# Patient Record
Sex: MALE | Race: ASIAN | NOT HISPANIC OR LATINO | URBAN - METROPOLITAN AREA
[De-identification: names, ages, dates, MRNs, and addresses within clinical notes are randomized per-mention and may not be internally consistent; named-entity substitution may affect disease eponyms.]

---

## 2019-09-12 ENCOUNTER — INPATIENT (INPATIENT)
Facility: HOSPITAL | Age: 64
LOS: 8 days | DRG: 3 | End: 2019-09-21
Attending: STUDENT IN AN ORGANIZED HEALTH CARE EDUCATION/TRAINING PROGRAM | Admitting: HOSPITALIST
Payer: MEDICAID

## 2019-09-12 VITALS
HEART RATE: 78 BPM | WEIGHT: 160.06 LBS | DIASTOLIC BLOOD PRESSURE: 83 MMHG | OXYGEN SATURATION: 99 % | RESPIRATION RATE: 18 BRPM | HEIGHT: 67 IN | TEMPERATURE: 98 F | SYSTOLIC BLOOD PRESSURE: 196 MMHG

## 2019-09-12 DIAGNOSIS — I20.0 UNSTABLE ANGINA: ICD-10-CM

## 2019-09-12 DIAGNOSIS — R51 HEADACHE: ICD-10-CM

## 2019-09-12 DIAGNOSIS — I25.10 ATHEROSCLEROTIC HEART DISEASE OF NATIVE CORONARY ARTERY WITHOUT ANGINA PECTORIS: ICD-10-CM

## 2019-09-12 DIAGNOSIS — F17.210 NICOTINE DEPENDENCE, CIGARETTES, UNCOMPLICATED: ICD-10-CM

## 2019-09-12 DIAGNOSIS — Z29.9 ENCOUNTER FOR PROPHYLACTIC MEASURES, UNSPECIFIED: ICD-10-CM

## 2019-09-12 DIAGNOSIS — I10 ESSENTIAL (PRIMARY) HYPERTENSION: ICD-10-CM

## 2019-09-12 DIAGNOSIS — Z98.890 OTHER SPECIFIED POSTPROCEDURAL STATES: Chronic | ICD-10-CM

## 2019-09-12 DIAGNOSIS — I25.119 ATHEROSCLEROTIC HEART DISEASE OF NATIVE CORONARY ARTERY WITH UNSPECIFIED ANGINA PECTORIS: ICD-10-CM

## 2019-09-12 DIAGNOSIS — E11.49 TYPE 2 DIABETES MELLITUS WITH OTHER DIABETIC NEUROLOGICAL COMPLICATION: ICD-10-CM

## 2019-09-12 LAB
ALBUMIN SERPL ELPH-MCNC: 4 G/DL — SIGNIFICANT CHANGE UP (ref 3.3–5)
ALP SERPL-CCNC: 85 U/L — SIGNIFICANT CHANGE UP (ref 40–120)
ALT FLD-CCNC: 15 U/L — SIGNIFICANT CHANGE UP (ref 10–45)
ANION GAP SERPL CALC-SCNC: 16 MMOL/L — SIGNIFICANT CHANGE UP (ref 5–17)
APTT BLD: 24.7 SEC — LOW (ref 27.5–36.3)
AST SERPL-CCNC: 13 U/L — SIGNIFICANT CHANGE UP (ref 10–40)
BILIRUB SERPL-MCNC: 0.7 MG/DL — SIGNIFICANT CHANGE UP (ref 0.2–1.2)
BLD GP AB SCN SERPL QL: NEGATIVE — SIGNIFICANT CHANGE UP
BUN SERPL-MCNC: 11 MG/DL — SIGNIFICANT CHANGE UP (ref 7–23)
CALCIUM SERPL-MCNC: 9.6 MG/DL — SIGNIFICANT CHANGE UP (ref 8.4–10.5)
CHLORIDE SERPL-SCNC: 99 MMOL/L — SIGNIFICANT CHANGE UP (ref 96–108)
CO2 SERPL-SCNC: 25 MMOL/L — SIGNIFICANT CHANGE UP (ref 22–31)
CREAT SERPL-MCNC: 0.94 MG/DL — SIGNIFICANT CHANGE UP (ref 0.5–1.3)
FIBRINOGEN PPP-MCNC: 578 MG/DL — HIGH (ref 350–510)
GLUCOSE SERPL-MCNC: 264 MG/DL — HIGH (ref 70–99)
HBA1C BLD-MCNC: 9.3 % — HIGH (ref 4–5.6)
HCT VFR BLD CALC: 45.5 % — SIGNIFICANT CHANGE UP (ref 39–50)
HGB BLD-MCNC: 15.5 G/DL — SIGNIFICANT CHANGE UP (ref 13–17)
INR BLD: 1.06 RATIO — SIGNIFICANT CHANGE UP (ref 0.88–1.16)
MCHC RBC-ENTMCNC: 31.1 PG — SIGNIFICANT CHANGE UP (ref 27–34)
MCHC RBC-ENTMCNC: 34 GM/DL — SIGNIFICANT CHANGE UP (ref 32–36)
MCV RBC AUTO: 91.5 FL — SIGNIFICANT CHANGE UP (ref 80–100)
NT-PROBNP SERPL-SCNC: 939 PG/ML — HIGH (ref 0–300)
PA ADP PRP-ACNC: 130 PRU — LOW (ref 194–417)
PLATELET # BLD AUTO: 111 K/UL — LOW (ref 150–400)
POTASSIUM SERPL-MCNC: 4 MMOL/L — SIGNIFICANT CHANGE UP (ref 3.5–5.3)
POTASSIUM SERPL-SCNC: 4 MMOL/L — SIGNIFICANT CHANGE UP (ref 3.5–5.3)
PROT SERPL-MCNC: 7.1 G/DL — SIGNIFICANT CHANGE UP (ref 6–8.3)
PROTHROM AB SERPL-ACNC: 12.2 SEC — SIGNIFICANT CHANGE UP (ref 10–12.9)
RBC # BLD: 4.97 M/UL — SIGNIFICANT CHANGE UP (ref 4.2–5.8)
RBC # FLD: 12.5 % — SIGNIFICANT CHANGE UP (ref 10.3–14.5)
RH IG SCN BLD-IMP: POSITIVE — SIGNIFICANT CHANGE UP
SODIUM SERPL-SCNC: 140 MMOL/L — SIGNIFICANT CHANGE UP (ref 135–145)
TROPONIN T, HIGH SENSITIVITY RESULT: 19 NG/L — SIGNIFICANT CHANGE UP (ref 0–51)
TROPONIN T, HIGH SENSITIVITY RESULT: 20 NG/L — SIGNIFICANT CHANGE UP (ref 0–51)
WBC # BLD: 9 K/UL — SIGNIFICANT CHANGE UP (ref 3.8–10.5)
WBC # FLD AUTO: 9 K/UL — SIGNIFICANT CHANGE UP (ref 3.8–10.5)

## 2019-09-12 PROCEDURE — 99254 IP/OBS CNSLTJ NEW/EST MOD 60: CPT | Mod: GC

## 2019-09-12 PROCEDURE — 93308 TTE F-UP OR LMTD: CPT | Mod: 26

## 2019-09-12 PROCEDURE — 99223 1ST HOSP IP/OBS HIGH 75: CPT | Mod: GC

## 2019-09-12 PROCEDURE — 70450 CT HEAD/BRAIN W/O DYE: CPT | Mod: 26

## 2019-09-12 PROCEDURE — 93306 TTE W/DOPPLER COMPLETE: CPT | Mod: 26

## 2019-09-12 PROCEDURE — 71046 X-RAY EXAM CHEST 2 VIEWS: CPT | Mod: 26

## 2019-09-12 PROCEDURE — 93010 ELECTROCARDIOGRAM REPORT: CPT

## 2019-09-12 PROCEDURE — 99152 MOD SED SAME PHYS/QHP 5/>YRS: CPT | Mod: GC

## 2019-09-12 PROCEDURE — 75574 CT ANGIO HRT W/3D IMAGE: CPT | Mod: 26

## 2019-09-12 PROCEDURE — 99285 EMERGENCY DEPT VISIT HI MDM: CPT

## 2019-09-12 PROCEDURE — 99255 IP/OBS CONSLTJ NEW/EST HI 80: CPT

## 2019-09-12 PROCEDURE — 93458 L HRT ARTERY/VENTRICLE ANGIO: CPT | Mod: 26,GC

## 2019-09-12 RX ORDER — CHLORHEXIDINE GLUCONATE 213 G/1000ML
1 SOLUTION TOPICAL ONCE
Refills: 0 | Status: COMPLETED | OUTPATIENT
Start: 2019-09-13 | End: 2019-09-13

## 2019-09-12 RX ORDER — ASPIRIN/CALCIUM CARB/MAGNESIUM 324 MG
324 TABLET ORAL ONCE
Refills: 0 | Status: COMPLETED | OUTPATIENT
Start: 2019-09-12 | End: 2019-09-12

## 2019-09-12 RX ORDER — METFORMIN HYDROCHLORIDE 850 MG/1
0 TABLET ORAL
Qty: 0 | Refills: 0 | DISCHARGE

## 2019-09-12 RX ORDER — SODIUM CHLORIDE 9 MG/ML
1000 INJECTION, SOLUTION INTRAVENOUS
Refills: 0 | Status: DISCONTINUED | OUTPATIENT
Start: 2019-09-12 | End: 2019-09-19

## 2019-09-12 RX ORDER — INSULIN LISPRO 100/ML
VIAL (ML) SUBCUTANEOUS
Refills: 0 | Status: DISCONTINUED | OUTPATIENT
Start: 2019-09-12 | End: 2019-09-19

## 2019-09-12 RX ORDER — INFLUENZA VIRUS VACCINE 15; 15; 15; 15 UG/.5ML; UG/.5ML; UG/.5ML; UG/.5ML
0.5 SUSPENSION INTRAMUSCULAR ONCE
Refills: 0 | Status: DISCONTINUED | OUTPATIENT
Start: 2019-09-12 | End: 2019-09-21

## 2019-09-12 RX ORDER — HYDRALAZINE HCL 50 MG
10 TABLET ORAL ONCE
Refills: 0 | Status: COMPLETED | OUTPATIENT
Start: 2019-09-12 | End: 2019-09-12

## 2019-09-12 RX ORDER — METOPROLOL TARTRATE 50 MG
12.5 TABLET ORAL
Refills: 0 | Status: DISCONTINUED | OUTPATIENT
Start: 2019-09-12 | End: 2019-09-14

## 2019-09-12 RX ORDER — ASPIRIN/CALCIUM CARB/MAGNESIUM 324 MG
325 TABLET ORAL ONCE
Refills: 0 | Status: DISCONTINUED | OUTPATIENT
Start: 2019-09-12 | End: 2019-09-12

## 2019-09-12 RX ORDER — CAPTOPRIL 12.5 MG/1
25 TABLET ORAL DAILY
Refills: 0 | Status: DISCONTINUED | OUTPATIENT
Start: 2019-09-12 | End: 2019-09-12

## 2019-09-12 RX ORDER — DEXTROSE 50 % IN WATER 50 %
15 SYRINGE (ML) INTRAVENOUS ONCE
Refills: 0 | Status: DISCONTINUED | OUTPATIENT
Start: 2019-09-12 | End: 2019-09-19

## 2019-09-12 RX ORDER — CAPTOPRIL 12.5 MG/1
0 TABLET ORAL
Qty: 0 | Refills: 0 | DISCHARGE

## 2019-09-12 RX ORDER — INSULIN LISPRO 100/ML
VIAL (ML) SUBCUTANEOUS AT BEDTIME
Refills: 0 | Status: DISCONTINUED | OUTPATIENT
Start: 2019-09-12 | End: 2019-09-19

## 2019-09-12 RX ORDER — DEXTROSE 50 % IN WATER 50 %
25 SYRINGE (ML) INTRAVENOUS ONCE
Refills: 0 | Status: DISCONTINUED | OUTPATIENT
Start: 2019-09-12 | End: 2019-09-19

## 2019-09-12 RX ORDER — ATORVASTATIN CALCIUM 80 MG/1
80 TABLET, FILM COATED ORAL AT BEDTIME
Refills: 0 | Status: DISCONTINUED | OUTPATIENT
Start: 2019-09-12 | End: 2019-09-19

## 2019-09-12 RX ORDER — CAPTOPRIL 12.5 MG/1
50 TABLET ORAL ONCE
Refills: 0 | Status: DISCONTINUED | OUTPATIENT
Start: 2019-09-12 | End: 2019-09-12

## 2019-09-12 RX ORDER — GLUCAGON INJECTION, SOLUTION 0.5 MG/.1ML
1 INJECTION, SOLUTION SUBCUTANEOUS ONCE
Refills: 0 | Status: DISCONTINUED | OUTPATIENT
Start: 2019-09-12 | End: 2019-09-19

## 2019-09-12 RX ORDER — CHLORHEXIDINE GLUCONATE 213 G/1000ML
1 SOLUTION TOPICAL ONCE
Refills: 0 | Status: COMPLETED | OUTPATIENT
Start: 2019-09-12 | End: 2019-09-12

## 2019-09-12 RX ORDER — CAPTOPRIL 12.5 MG/1
25 TABLET ORAL
Refills: 0 | Status: DISCONTINUED | OUTPATIENT
Start: 2019-09-12 | End: 2019-09-12

## 2019-09-12 RX ORDER — CEFUROXIME AXETIL 250 MG
1500 TABLET ORAL ONCE
Refills: 0 | Status: DISCONTINUED | OUTPATIENT
Start: 2019-09-12 | End: 2019-09-18

## 2019-09-12 RX ORDER — ASPIRIN/CALCIUM CARB/MAGNESIUM 324 MG
81 TABLET ORAL DAILY
Refills: 0 | Status: DISCONTINUED | OUTPATIENT
Start: 2019-09-12 | End: 2019-09-19

## 2019-09-12 RX ORDER — NICOTINE POLACRILEX 2 MG
1 GUM BUCCAL DAILY
Refills: 0 | Status: DISCONTINUED | OUTPATIENT
Start: 2019-09-12 | End: 2019-09-19

## 2019-09-12 RX ORDER — CHLORHEXIDINE GLUCONATE 213 G/1000ML
30 SOLUTION TOPICAL ONCE
Refills: 0 | Status: COMPLETED | OUTPATIENT
Start: 2019-09-12 | End: 2019-09-13

## 2019-09-12 RX ORDER — DEXTROSE 50 % IN WATER 50 %
12.5 SYRINGE (ML) INTRAVENOUS ONCE
Refills: 0 | Status: DISCONTINUED | OUTPATIENT
Start: 2019-09-12 | End: 2019-09-19

## 2019-09-12 RX ORDER — METOPROLOL TARTRATE 50 MG
100 TABLET ORAL ONCE
Refills: 0 | Status: COMPLETED | OUTPATIENT
Start: 2019-09-12 | End: 2019-09-12

## 2019-09-12 RX ADMIN — ATORVASTATIN CALCIUM 80 MILLIGRAM(S): 80 TABLET, FILM COATED ORAL at 21:20

## 2019-09-12 RX ADMIN — CHLORHEXIDINE GLUCONATE 1 APPLICATION(S): 213 SOLUTION TOPICAL at 23:21

## 2019-09-12 RX ADMIN — Medication 324 MILLIGRAM(S): at 10:03

## 2019-09-12 RX ADMIN — Medication 10 MILLIGRAM(S): at 17:52

## 2019-09-12 RX ADMIN — Medication 12.5 MILLIGRAM(S): at 21:20

## 2019-09-12 RX ADMIN — Medication 100 MILLIGRAM(S): at 10:38

## 2019-09-12 NOTE — H&P ADULT - NSICDXPASTMEDICALHX_GEN_ALL_CORE_FT
PAST MEDICAL HISTORY:  Hypertension, unspecified type     Type 2 diabetes mellitus with other neurologic complication, without long-term current use of insulin

## 2019-09-12 NOTE — H&P ADULT - NSHPSOCIALHISTORY_GEN_ALL_CORE
Former every day alcohol drinker - endorses quitting around 2011 with drinking now every 3-4 months  Current every day smoker. 2ppd for many years.  Denies prior drug use.

## 2019-09-12 NOTE — ED PROVIDER NOTE - PMH
Hypertension, unspecified type    Type 2 diabetes mellitus with other neurologic complication, without long-term current use of insulin

## 2019-09-12 NOTE — ED PROCEDURE NOTE - ATTENDING CONTRIBUTION TO CARE
I have participated in and supervised all key portions of the above procedures and agree with the above documentation. ROCIO Huerta MD
I have participated in and supervised all key portions of the above procedures and agree with the above documentation. ROCIO Huerta MD

## 2019-09-12 NOTE — H&P ADULT - NSICDXFAMILYHX_GEN_ALL_CORE_FT
FAMILY HISTORY:  FH: coronary artery disease FAMILY HISTORY:  Family history of breast cancer in sister  FH: coronary artery disease  FH: type 2 diabetes  FHx: hyperlipidemia  FHx: hypertension

## 2019-09-12 NOTE — H&P ADULT - NSHPLABSRESULTS_GEN_ALL_CORE
15.5   9.0   )-----------( 111      ( 12 Sep 2019 09:34 )             45.5     09-12    140  |  99  |  11  ----------------------------<  264<H>  4.0   |  25  |  0.94    Ca    9.6      12 Sep 2019 09:34    TPro  7.1  /  Alb  4.0  /  TBili  0.7  /  DBili  x   /  AST  13  /  ALT  15  /  AlkPhos  85  09-12    Troponin 19--> 20    < from: Xray Chest 2 Views PA/Lat (09.12.19 @ 09:45) >    IMPRESSION: Clear lungs.    < end of copied text >    < from: CT Heart with Coronaries (09.12.19 @ 12:09) >    IMPRESSION:    1.  Total coronary artery calcium score: 646.  2. Mixed plaques within left main with moderate (approximately 60-70%)   stenosis.  3.  Mixed plaques within the proximal and mid LAD with severe   (approximately 80-90%) stenosis. Noncalcified plaques through the distal   LAD with mild (iltgslklezgcp21-84%) stenosis.  4.  Mixed plaques within the proximal D1 and through the course of the D2   branch. Evaluation of the degree of stenosis is difficult secondary to   small caliber of the vessels but grossly patent.  5.  Mixed plaques within the proximal RCA with severe (100%) stenosis.   There is reconstitution of the mid and distal RCA. Noncalcified plaques   within the mid RCA with moderate (approximately 70%) stenosis. Mixed   plaques within the distal RCA with mild (approximately 30-40%) stenosis.  6.  Left ventricle hypertrophy measuring up to 16 mm within the basal   septum. Recommend correlation with echocardiogram.  7.  No pulmonary embolus.  8.  Emphysema.    < end of copied text > 15.5   9.0   )-----------( 111      ( 12 Sep 2019 09:34 )             45.5     09-12    140  |  99  |  11  ----------------------------<  264<H>  4.0   |  25  |  0.94    Ca    9.6      12 Sep 2019 09:34    TPro  7.1  /  Alb  4.0  /  TBili  0.7  /  DBili  x   /  AST  13  /  ALT  15  /  AlkPhos  85  09-12    Troponin 19--> 20    < from: Xray Chest 2 Views PA/Lat (09.12.19 @ 09:45) >    IMPRESSION: Clear lungs.    < end of copied text >    EKG tracing reviewed- abnormal paxis @82bpm, TWI v1-V2, slight STD V5-V6    < from: CT Heart with Coronaries (09.12.19 @ 12:09) >    IMPRESSION:    1.  Total coronary artery calcium score: 646.  2. Mixed plaques within left main with moderate (approximately 60-70%)   stenosis.  3.  Mixed plaques within the proximal and mid LAD with severe   (approximately 80-90%) stenosis. Noncalcified plaques through the distal   LAD with mild (mwsadbjvpntev20-18%) stenosis.  4.  Mixed plaques within the proximal D1 and through the course of the D2   branch. Evaluation of the degree of stenosis is difficult secondary to   small caliber of the vessels but grossly patent.  5.  Mixed plaques within the proximal RCA with severe (100%) stenosis.   There is reconstitution of the mid and distal RCA. Noncalcified plaques   within the mid RCA with moderate (approximately 70%) stenosis. Mixed   plaques within the distal RCA with mild (approximately 30-40%) stenosis.  6.  Left ventricle hypertrophy measuring up to 16 mm within the basal   septum. Recommend correlation with echocardiogram.  7.  No pulmonary embolus.  8.  Emphysema.    < end of copied text >

## 2019-09-12 NOTE — H&P ADULT - ASSESSMENT
64 year-old [place]ian male with history of hypertension, T2DM, and current smoker admitted with unstable angina found on coronary catheterization to have triple vessel disease.

## 2019-09-12 NOTE — CONSULT NOTE ADULT - PROBLEM SELECTOR RECOMMENDATION 9
Continue pre-op cardiac surgery workup  Check TTE/US Carotids/PFTs  Check P2y12  Pt needs 2 type +screen specimens  NPO p MN, Chlorhexidine Shower  Plan for CABG 1st case Friday with Dr. Santos

## 2019-09-12 NOTE — ED PROVIDER NOTE - NS ED ATTENDING STATEMENT MOD
I have personally seen and examined this patient. I have fully participated in the care of this patient. I have reviewed all pertinent clinical information, including history, physical exam, plan and the Medical Student's note and agree except as noted. I have personally performed a face to face diagnostic evaluation on this patient. I have reviewed the ACP note and agree with the history, exam and plan of care, except as noted.

## 2019-09-12 NOTE — ED PROCEDURE NOTE - US CPT CODES
06688 US Chest (PTX, Pleural Effussion/CHF vs COPD)/80129 Echocardiography Transthoracic with Image 2D (Echo/FAST)
46263 Guidance for Vascular Access

## 2019-09-12 NOTE — CONSULT NOTE ADULT - ASSESSMENT
65 y/o male traveler from [facility] with history of T2DM, HTN and current 2ppd smoking who presents with 6 days of left sternal chest pain and a right-sided headache with posterior right neck pain that has been present for two weeks as well. This is intermittent and lasts for 15 minutes. In ED, EKG w nonspecific ST and T wave abnormality and possible ectopic atrial rhythm. Troponins normal. CXR shows clear lungs. CT Coronary was performed to r/o PE, showed significant calcification and no pulmonary embolus. Cardiology consulted, now s/p cardiac cath demonstrating 3VD and CT surgery consulted for CABG evaluation.

## 2019-09-12 NOTE — ED PROVIDER NOTE - OBJECTIVE STATEMENT
64yom pmhx HTN DM on metformin, recent return from [facility] (12 hour connecting flighting) c.o L sided chest pain with mild sob, started today around 2 hours ago but intermittent since Friday. Woke up due to pain this am. pain at rest. No cardiac hx. Family hx of CAD. +lifelong smoker 64yom pmhx HTN (on captopril), DM on metformin, recent return from [facility] (12 hour connecting flighting) c.o L sided chest pain with mild sob, started today around 2 hours ago but intermittent since Friday. Woke up due to pain this am. Pain at rest. Pain is sharp, 8/10, non-radiating. No cardiac hx. Family hx of CAD. +lifelong smoker ([id number]). Missed his BP medication (captopril) this morning, and has poor compliance with BP meds. Denies n/v/d, abdominal pain, palpitations. Endorses some dizziness when the pain comes on. 64yom pmhx HTN (on captopril), DM on metformin, recent return from [facility] (12 hour connecting flighting) c.o L sided chest pain with mild sob, started today around 2 hours ago but intermittent since Friday. Woke up due to pain this am. Pain at rest. Pain is sharp, 8/10, non-radiating. No cardiac hx. Family hx of CAD. +lifelong smoker ([id number]). Missed his BP medication (captopril) this morning, and has poor compliance with BP meds. Denies n/v/d, abdominal pain, palpitations. Endorses some dizziness when the pain comes on.    Attending note (Bradley): 63y/o M with h/o HTN, DM2 (metformin) p/w left sided chest pain, occurring intermittently since Friday (6 days ago); occurring at rest, not a/w activity, exertion or food.  Pain sharp, located in left side of chest, non-radiating, and seems to resolve after 1-2 hours with no discernable relieving factors.  No cough.  no fever.  Pain worse since approximately 3AM this morning, near constant since then, though for past 2 hours it felt "sharper" than previously.  No SOB, no dyspnea on exertion, no fever/chills, no nausea/vomiting, no back pain, no h/o VTE/DVT/PE, no h/o cancer, no h/o recent surgery/hospitalization; +recent travel (flew back from [facility] 6 days ago; states pain started while on the plane).  +smoker (2ppd for 40+ years).  +Fh/o CAD 64yom pmhx HTN (on captopril), DM on metformin, recent return from [facility] on Friday (12 hour connecting flighting) c.o L sided chest pain with mild sob, started today around 2 hours ago but intermittent since Friday. Woke up due to pain this am. Pain at rest. Pain is sharp, 8/10, non-radiating. No cardiac hx. Family hx of CAD. +lifelong smoker ([id number]). Missed his BP medication (captopril) this morning, and has poor compliance with BP meds. Denies n/v/d, abdominal pain, palpitations. Endorses some dizziness when the pain comes on.    Attending note (Bradley): 65y/o M with h/o HTN, DM2 (metformin) p/w left sided chest pain, occurring intermittently since Friday (6 days ago); occurring at rest, not a/w activity, exertion or food.  Pain sharp, located in left side of chest, non-radiating, and seems to resolve after 1-2 hours with no discernable relieving factors.  No cough.  no fever.  Pain worse since approximately 3AM this morning, near constant since then, though for past 2 hours it felt "sharper" than previously.  No SOB, no dyspnea on exertion, no fever/chills, no nausea/vomiting, no back pain, no h/o VTE/DVT/PE, no h/o cancer, no h/o recent surgery/hospitalization; +recent travel (flew back from [facility] 6 days ago; states pain started while on the plane).  +smoker (2ppd for 40+ years).  +Fh/o CAD

## 2019-09-12 NOTE — ED PROVIDER NOTE - PROGRESS NOTE DETAILS
D-dimer elevated, will obtain CTA D-dimer elevated, will obtain CT Coronary to r/o PE and evaluate coronaries Attending note (Bradley): elevated d dimer, concerning for Pe; however, presentation also highly concerning for ACS/unstable angina; will obtain CTA PA and coronaries to further evaluate; 18g catheter placed and patient to go to CT, spoke with Ct tech.

## 2019-09-12 NOTE — CONSULT NOTE ADULT - SUBJECTIVE AND OBJECTIVE BOX
All Cardiology service information can be found 24/7 on [email or web address], password: [email or web address]    Patient seen and evaluated at bedside    Chief Complaint: chest pain    HPI:    64 M w/ HTN and T2DM p/w L substernal chest pressure that started for the past ~week. He returned from a trip to [facility] last week and presented to the hospital last night as the pain was not abating. Denies any chest pain at time of eval. Pain has been occurring intermittently and is non-exertional and non-radiating. Pt w/ extensive family hx of CAD and currently smoked 2ppd.       PMHx:   Type 2 diabetes mellitus with other neurologic complication, without long-term current use of insulin  Hypertension, unspecified type      PSHx: non-contributory      Allergies:  No Known Allergies      Home Meds: Metformin, captopril     Current Medications:       FAMILY HISTORY: CAD in mother and father      Social History:  +2ppd smoking hx, no alcohol, or IVDU    REVIEW OF SYSTEMS:  CONSTITUTIONAL: No weakness, fevers or chills  EYES/ENT: No visual changes;  No dysphagia  NECK: No pain or stiffness  RESPIRATORY: No cough, wheezing, hemoptysis; No shortness of breath  CARDIOVASCULAR: +chest pain  GASTROINTESTINAL: No abdominal or epigastric pain. No nausea, vomiting, or hematemesis; No diarrhea or constipation. No melena or hematochezia.  BACK: No back pain  GENITOURINARY: No dysuria, frequency or hematuria  NEUROLOGICAL: No numbness or weakness  SKIN: No itching, burning, rashes, or lesions   All other review of systems is negative unless indicated above.    Physical Exam:  T(F): 98.3 (09-12), Max: 98.3 (09-12)  HR: 55 (09-12) (55 - 78)  BP: 160/78 (09-12) (160/78 - 196/83)  RR: 17 (09-12)  SpO2: 97% (09-12)  GENERAL: No acute distress  HEAD:  Atraumatic, Normocephalic  ENT: EOMI, PERRLA, conjunctiva and sclera clear, Neck supple, No JVD, moist mucosa  CHEST/LUNG: Clear to auscultation bilaterally; No wheeze, equal breath sounds bilaterally   BACK: No spinal tenderness  HEART: Regular rate and rhythm; No murmurs, rubs, or gallops  ABDOMEN: Soft, Nontender, Nondistended; Bowel sounds present  EXTREMITIES:  No clubbing, cyanosis, or edema  PSYCH: Nl behavior, nl affect  NEUROLOGY: AAOx3, non-focal, cranial nerves intact  SKIN: Normal color, No rashes or lesions  LINES:    Cardiovascular Diagnostic Testing:    ECG: Personally reviewed: NSR, non-specific t-wave changes     Imaging:    < from: CT Heart with Coronaries (09.12.19 @ 12:09) >  CORONARY ARTERY ANGIOGRAM FINDINGS:    Left main: The left main is a normal caliber vessel which gives rise to   the LAD and circumflex arteries. Mixed plaques within left main with   moderate (approximately 60-70%) stenosis.    LAD: The LAD is a normal caliber vessel. Mixed plaques within the   proximal and mid LAD with severe (approximately 80-90%) stenosis.   Noncalcified plaques through the distal LAD with mild (approximately   30-40%) stenosis.    Mixed plaques within the proximal D1 and through the course of the D2   branch. Evaluation of the degree of stenosis is difficult secondary to   small caliber of the vessels but grossly patent.    LCX: The circumflex artery is a small caliber vessel. Mixed plaques   within proximal and distal LCx with severe (approximately 80-90%)   stenosis.    RCA: The right coronary artery is dominant giving rise to the posterior   descending artery and a posterolateral branch. Mixed plaques within the   proximal RCA with severe (100%) stenosis. There is reconstitution of the   mid and distal RCA. Noncalcified plaques within the mid RCA with moderate   (approximately 70%) stenosis. Mixed plaques within the distal RCA with   mild (approximately 30-40%) stenosis.    DOMINANCE: Right coronary artery.    < end of copied text >      CXR: Personally reviewed    Labs: Personally reviewed                        15.5   9.0   )-----------( 111      ( 12 Sep 2019 09:34 )             45.5     09-12    140  |  99  |  11  ----------------------------<  264<H>  4.0   |  25  |  0.94    Ca    9.6      12 Sep 2019 09:34    TPro  7.1  /  Alb  4.0  /  TBili  0.7  /  DBili  x   /  AST  13  /  ALT  15  /  AlkPhos  85  09-12    PT/INR - ( 12 Sep 2019 09:34 )   PT: 12.2 sec;   INR: 1.06 ratio         PTT - ( 12 Sep 2019 09:34 )  PTT:24.7 sec    CARDIAC MARKERS ( 12 Sep 2019 09:34 )  19 ng/L / x     / x     / x     / x     / x All Cardiology service information can be found 24/7 on [email or web address], password: [email or web address]        Patient seen and evaluated at bedside    Chief Complaint: chest pain    64 M w/ HTN and T2DM, p/w L substernal chest pressure on and off since returning from a trip to [facility] last week.  Presented to the hospital last night as the pain was not abating. Denies any chest pain at present.  Pain has been occurring intermittently and is non-exertional and non-radiating. Pt w/ extensive family hx of CAD and currently smokes 2ppd.       PMHx:   Type 2 diabetes mellitus with other neurologic complication, without long-term current use of insulin  Hypertension, unspecified type      PSHx: non-contributory      Allergies:  No Known Allergies      Home Meds: Metformin, captopril       FAMILY HISTORY: CAD in mother and father      Social History:  +2ppd smoking hx, no alcohol, or IVDU    REVIEW OF SYSTEMS:  CONSTITUTIONAL: No weakness, fevers or chills  EYES/ENT: No visual changes;  No dysphagia  NECK: No pain or stiffness  RESPIRATORY: No cough, wheezing, hemoptysis; No shortness of breath  CARDIOVASCULAR: +chest pain  GASTROINTESTINAL: No abdominal or epigastric pain. No nausea, vomiting, or hematemesis; No diarrhea or constipation. No melena or hematochezia.  BACK: No back pain  GENITOURINARY: No dysuria, frequency or hematuria  NEUROLOGICAL: No numbness or weakness  SKIN: No itching, burning, rashes, or lesions   All other review of systems is negative unless indicated above.      Physical Exam:  T(F): 98.3 (09-12), Max: 98.3 (09-12)  HR: 55 (09-12) (55 - 78)  BP: 160/78 (09-12) (160/78 - 196/83)  RR: 17 (09-12)  SpO2: 97% (09-12)    GENERAL: No acute distress  HEAD:  Atraumatic, Normocephalic  ENT: EOMI, PERRLA, conjunctiva and sclera clear, Neck supple, No JVD, moist mucosa  CHEST/LUNG: Clear to auscultation bilaterally; No wheeze, equal breath sounds bilaterally   BACK: No spinal tenderness  HEART: Regular rate and rhythm; No murmurs, rubs, or gallops  ABDOMEN: Soft, Nontender, Nondistended; Bowel sounds present  EXTREMITIES:  No clubbing, cyanosis, or edema  PSYCH: Nl behavior, nl affect  NEUROLOGY: AAOx3, non-focal, cranial nerves intact  SKIN: Normal color, No rashes or lesions      12 Lead ECG (09.12.19 @ 08:55)   NSR at 82 BPM   P-R Interval 148 ms, QRS Duration 90 ms, Q-T Interval 382 ms   Axis -29 degrees   MINIMAL VOLTAGE CRITERIA FOR LVH, MAY BE NORMAL VARIANT, NONSPECIFIC ST AND T WAVE ABNORMALITY      CT Heart with Coronaries (09.12.19 @ 12:09) >  CORONARY ARTERY ANGIOGRAM FINDINGS:    Left main: The left main is a normal caliber vessel which gives rise to the LAD and circumflex arteries. Mixed plaques within left main with moderate (approximately 60-70%) stenosis.    LAD: The LAD is a normal caliber vessel. Mixed plaques within the proximal and mid LAD with severe (approximately 80-90%) stenosis. Noncalcified plaques through the distal LAD with mild (approximately 30-40%) stenosis.  Mixed plaques within the proximal D1 and through the course of the D2 branch. Evaluation of the degree of stenosis is difficult secondary to small caliber of the vessels but grossly patent.    LCX: The circumflex artery is a small caliber vessel. Mixed plaques within proximal and distal LCx with severe (approximately 80-90%) stenosis.    RCA: The right coronary artery is dominant giving rise to the posterior descending artery and a posterolateral branch. Mixed plaques within the proximal RCA with severe (100%) stenosis. There is reconstitution of the mid and distal RCA. Noncalcified plaques within the mid RCA with moderate  (approximately 70%) stenosis. Mixed plaques within the distal RCA with  mild (approximately 30-40%) stenosis.    DOMINANCE: Right coronary artery.        Xray Chest 2 Views PA/Lat (09.12.19 @ 09:45) >  INTERPRETATION:  HISTORY: Chest pain  COMPARISON: None.  FINDINGS:  The cardiac silhouette is normal in size. There are no focal consolidations or pleural effusions. The hilar and mediastinal structures appear unremarkable. The osseous structures are intact.    IMPRESSION: Clear lungs.    ORION GOMEZ M.D., ATTENDING RADIOLOGIST  This document has been electronically signed. Sep 12 2019 10:07AM      LABS:                15.5   9.0   )-----------( 111      ( 12 Sep 2019 09:34 )             45.5     09-12  140  |  99  |  11  ----------------------------<  264<H>  4.0   |  25  |  0.94    Ca    9.6      12 Sep 2019 09:34    TPro  7.1  /  Alb  4.0  /  TBili  0.7  /  DBili  x   /  AST  13  /  ALT  15  /  AlkPhos  85  09-12    PT/INR - ( 12 Sep 2019 09:34 )   PT: 12.2 sec;   INR: 1.06 ratio    PTT - ( 12 Sep 2019 09:34 )  PTT:24.7 sec    CARDIAC MARKERS ( 12 Sep 2019 09:34 ) 19 ng/L / x     / x     / x     / x     / x

## 2019-09-12 NOTE — ED PROVIDER NOTE - CLINICAL SUMMARY MEDICAL DECISION MAKING FREE TEXT BOX
63 yo with hx of htn, dm presenting with cp and sob worsening over 4 days in the context of recent flight to [facility]. Exam significant for hypertension to 190s/80s, otherwise benign. Ddx includes acs, pe, gerd, aortic dissection. Wells score is 1.5, unable to perc, will get d-dimer to r/o PE. Will obtain cardiac enzymes, cxr, ekg, basic labs. Dispo may be CDU for further risk stratification. 65 yo with hx of htn (on captopril), dm (on metformin) presenting with cp and sob worsening over 4 days in the context of recent flight to [facility]. Exam significant for hypertension to 190s/80s, otherwise benign. Ddx includes acs, pe, gerd, aortic dissection. Wells score is 1.5, unable to perc, will get d-dimer to r/o PE. Will obtain cardiac enzymes, cxr, ekg, basic labs. Will give 1 dose of captopril 50mg for bp control. Dispo may be CDU for further risk stratification.

## 2019-09-12 NOTE — H&P ADULT - NSHPPHYSICALEXAM_GEN_ALL_CORE
PHYSICAL EXAM:  Vital Signs Last 24 Hrs  T(C): 36.6 (09-12-19 @ 14:14)  T(F): 97.8 (09-12-19 @ 14:14), Max: 98.3 (09-12-19 @ 08:48)  HR: 56 (09-12-19 @ 14:14) (55 - 78)  BP: 155/74 (09-12-19 @ 14:14)  BP(mean): --  RR: 17 (09-12-19 @ 11:35) (17 - 18)  SpO2: 99% (09-12-19 @ 14:14) (97% - 99%)  Wt(kg): --    Constitutional: NAD, awake and alert  EYES: EOMI  ENT:  Normal Hearing, no tonsillar exudates   Neck: Soft and supple , no thyromegaly   Respiratory: Breath sounds are clear bilaterally, No wheezing, rales or rhonchi  Cardiovascular: S1 and S2, regular rate and rhythm, no Murmurs, gallops or rubs, no JVD,    Gastrointestinal: Bowel Sounds present, soft, nontender, nondistended, no guarding, no rebound  Extremities: No cyanosis or clubbing; warm to touch  Vascular: 2+ peripheral pulses lower ex  Neurological: No focal deficits, CN II-XII intact bilaterally, sensation to light touch intact in all extremities, gait intact. Pupils are equally reactive to light and symmetrical in size.   Musculoskeletal: 5/5 strength b/l upper and lower extremities; no joint swelling.  Skin: No rashes  Psych: no depression or anhedonia, AAOx3  HEME: no bruises, no nose bleeds PHYSICAL EXAM:  Vital Signs Last 24 Hrs  T(C): 36.6 (09-12-19 @ 14:14)  T(F): 97.8 (09-12-19 @ 14:14), Max: 98.3 (09-12-19 @ 08:48)  HR: 56 (09-12-19 @ 14:14) (55 - 78)  BP: 155/74 (09-12-19 @ 14:14)  BP(mean): --  RR: 17 (09-12-19 @ 11:35) (17 - 18)  SpO2: 99% (09-12-19 @ 14:14) (97% - 99%)  Wt(kg): --    Constitutional: NAD, awake and alert  EYES: EOMI. Scleral icterus  ENT:  Normal Hearing, no tonsillar exudates, wearing dentures  Neck: Soft and supple , no thyromegaly, nontender  Respiratory: Breath sounds are clear bilaterally, No wheezing, rales or rhonchi  Cardiovascular: S1 and S2, regular rate and rhythm, no Murmurs, gallops or rubs, no JVD  Gastrointestinal: Bowel Sounds present, soft, nontender, nondistended, no guarding, no rebound  Extremities: No cyanosis or clubbing; warm to touch  Vascular: 2+ peripheral pulses lower ex  Neurological: No focal deficits, CN II-XII intact bilaterally, sensation to light touch intact in all extremities, gait intact. Pupils are equally reactive to light and symmetrical in size.   Musculoskeletal: 5/5 strength b/l upper and lower extremities; no joint swelling.  Skin: No rashes  Psych: no depression or anhedonia, AAOx3  HEME: no bruises, no nose bleeds PHYSICAL EXAM:  Vital Signs Last 24 Hrs  T(C): 36.6 (09-12-19 @ 14:14)  T(F): 97.8 (09-12-19 @ 14:14), Max: 98.3 (09-12-19 @ 08:48)  HR: 56 (09-12-19 @ 14:14) (55 - 78)  BP: 155/74 (09-12-19 @ 14:14)  BP(mean): --  RR: 17 (09-12-19 @ 11:35) (17 - 18)  SpO2: 99% (09-12-19 @ 14:14) (97% - 99%)  Wt(kg): --    Constitutional: NAD, awake and alert  EYES: EOMI. Scleral icterus  ENT:  Normal Hearing, no tonsillar exudates, wearing dentures  Neck: Soft and supple, nontender  Respiratory: Breath sounds are clear bilaterally, No wheezing, rales or rhonchi  Cardiovascular: S1 and S2, regular rate and rhythm, no Murmurs, gallops or rubs, no JVD  Gastrointestinal: Bowel Sounds present, abdomen obese, soft, nontender, nondistended, no guarding, no rebound  Extremities: No cyanosis or clubbing; warm to touch, no lower extremity edema  Vascular: 2+ peripheral pulses lower ex  Neurological: No focal deficits, CN II-XII intact bilaterally, sensation to light touch intact in all extremities. Pupils are equally reactive to light and symmetrical in size.   Musculoskeletal: 5/5 strength b/l upper and lower extremities; no joint swelling.  Skin: No rashes  Psych: AAOx3  HEME: no bruises, no nose bleeds PHYSICAL EXAM:  Vital Signs Last 24 Hrs  T(C): 36.6 (09-12-19 @ 14:14)  T(F): 97.8 (09-12-19 @ 14:14), Max: 98.3 (09-12-19 @ 08:48)  HR: 56 (09-12-19 @ 14:14) (55 - 78)  BP: 155/74 (09-12-19 @ 14:14)  BP(mean): --  RR: 17 (09-12-19 @ 11:35) (17 - 18)  SpO2: 99% (09-12-19 @ 14:14) (97% - 99%)  Wt(kg): --    Constitutional: NAD, awake and alert  EYES: EOMI. Scleral icterus  ENT:  Normal Hearing, no tonsillar exudates, wearing dentures  Neck: Soft and supple, nontender  Respiratory: Breath sounds are clear bilaterally, No wheezing, rales or rhonchi  Cardiovascular: S1 and S2, regular rate and rhythm, no Murmurs, gallops or rubs, no JVD  Gastrointestinal: Bowel Sounds present, soft, nontender, nondistended, no guarding, no rebound  Extremities: No cyanosis or clubbing; warm to touch, no lower extremity edema, swelling, erythema, or tenderness  Vascular: 2+ peripheral pulses lower ex  Neurological: No focal deficits, CN II-XII intact bilaterally, sensation to light touch intact in all extremities. Pupils are equally reactive to light and symmetrical in size.   Musculoskeletal:  no joint swelling.  Skin: No rashes  Psych: AAOx3  HEME: no bruises, no nose bleeds

## 2019-09-12 NOTE — H&P ADULT - HISTORY OF PRESENT ILLNESS
64 year-old [place]er male with history of T2DM, HTN and current 2ppd smoking who presents with 6 days of left sternal chest pain. States he arrived from [facility] on Friday night, when he felt this pain for the first time. After this, it happened about once per day. Each of these times, he says it lasted about 2-3 minutes and was about a 5-6/10 in intensity. Each time, he was not exerting himself, but doing light activity such as bathing or bending over to pick something up. This morning, he was sleeping at about 3am and experienced more severe chest pain that woke him up. This lasted about 30 minutes and was 8/10 in intensity, which is what prompted him to come to the hospital. The pain was never associated with shortness of breath, sweating, or nausea. He has noticed a new cough that has been worse in the past week as well. He has never experienced this chest pain before. He complains of a right-sided headache with posterior right neck pain that has been present for two weeks as well. This is intermittent and lasts for 15 minutes. 64 year-old Burkin[place] male with history of T2DM, HTN and current 2ppd smoking who presents with 6 days of left sternal chest pain. States he arrived from [facility] on Friday night, when he felt this pain for the first time. After this, it happened about once per day. Each of these times, he says it lasted about 2-3 minutes and was about a 5-6/10 in intensity. Each time, he was not exerting himself, but doing light activity such as bathing or bending over to pick something up. This morning, he was sleeping at about 3am and experienced more severe chest pain that woke him up. This lasted about 30 minutes and was 8/10 in intensity, which is what prompted him to come to the hospital. The pain was never associated with shortness of breath, sweating, or nausea. He has noticed a new cough that has been worse in the past week as well. He has never experienced this chest pain before. He complains of a right-sided headache with posterior right neck pain that has been present for two weeks as well. This is intermittent and lasts for 15 minutes.     ED Course: EKG w nonspecific ST and T wave abnormality and possible ectopic 64 year-old American male with history of T2DM, HTN and current 2ppd smoking who presents with 6 days of left sternal chest pain. States he arrived from [facility] on Friday night, when he felt this pain for the first time. After this, it happened about once per day. Each of these times, he says it lasted about 2-3 minutes and was about a 5-6/10 in intensity. Each time, he was not exerting himself, but doing light activity such as bathing or bending over to pick something up. The pain is located at the left sternal border and is non-radiating. This morning, he was sleeping at about 3am and experienced more severe chest pain that woke him up. This lasted about 30 minutes and was 8/10 in intensity, which is what prompted him to come to the hospital. The pain was never associated with shortness of breath, sweating, or nausea. He has noticed a new cough that has been worse in the past week as well. He has never experienced this chest pain before. He takes metformin for diabetes and captopril for hypertension, but does not know the doses and states that he ran out of these of Tuesday.     He complains of a right-sided headache with posterior right neck pain that has been present for two weeks as well. This is intermittent and lasts for 15 minutes.     ED Course: EKG w nonspecific ST and T wave abnormality and possible ectopic atrial rhythm. Troponin 19-->20. Plt 111. Hb 15.5, WBC 9.0, D-dimer 505, INR 1.06, pTT 24.7, Cr 0.94, . CXR shows clear lungs. CT Coronary was performed to r/o PE, showed significant calcification and no pulmonary embolus. Cardiology consulted, admitted for cardiac cath.

## 2019-09-12 NOTE — CONSULT NOTE ADULT - NSHPATTENDINGPLANDISCUSS_GEN_ALL_CORE
Plan discussed with cardiology fellow, Dr. Rodriguez; patient seen and examined.       I was physically present for the key portions of the evaluation and management (E/M) service provided.    I agree with the above history, physical, and plan which I have reviewed and edited where appropriate.

## 2019-09-12 NOTE — ED PROCEDURE NOTE - PROCEDURE ADDITIONAL DETAILS
POCUS: Emergency Department Focused Ultrasound performed at patient's bedside for IV access.  The complete report will be available in PACS. Peripheral IV access in the Emergency Department obtained under dynamic ultrasound guidance with dark nonpulsatile blood return.  Catheter was flushed afterwards without any resistance or resulting extravasation.  IV catheter confirmed in compressible vein after insertion. 18-g catheter placed in a peripheral vein in the left upper extremity.

## 2019-09-12 NOTE — CONSULT NOTE ADULT - SUBJECTIVE AND OBJECTIVE BOX
History of Present Illness:  64 year-old [place]ian male with history of T2DM, HTN and current 2ppd smoking who presents with 6 days of left sternal chest pain. States he arrived from [facility] on Friday night, when he felt this pain for the first time. After this, it happened about once per day. Each of these times, he says it lasted about 2-3 minutes and was about a 5-6/10 in intensity. Each time, he was not exerting himself, but doing light activity such as bathing or bending over to pick something up. The pain is located at the left sternal border and is non-radiating. This morning, he was sleeping at about 3am and experienced more severe chest pain that woke him up. This lasted about 30 minutes and was 8/10 in intensity, which is what prompted him to come to the hospital. The pain was never associated with shortness of breath, sweating, or nausea. He has noticed a new cough that has been worse in the past week as well. He has never experienced this chest pain before. He takes metformin for diabetes and captopril for hypertension, but does not know the doses and states that he ran out of these of Tuesday.     He complains of a right-sided headache with posterior right neck pain that has been present for two weeks as well. This is intermittent and lasts for 15 minutes.     ED Course: EKG w nonspecific ST and T wave abnormality and possible ectopic atrial rhythm. Troponin 19-->20. Plt 111. Hb 15.5, WBC 9.0, D-dimer 505, INR 1.06, pTT 24.7, Cr 0.94, . CXR shows clear lungs. CT Coronary was performed to r/o PE, showed significant calcification and no pulmonary embolus. Cardiology consulted, admitted for cardiac cath. (12 Sep 2019 16:34)       Past Medical History  Type 2 diabetes mellitus with other neurologic complication, without long-term current use of insulin  Hypertension, unspecified type      Past Surgical History  H/O elbow surgery: Right elbow surgery 2007  No significant past surgical history      MEDICATIONS  (STANDING):  aspirin enteric coated 81 milliGRAM(s) Oral daily  atorvastatin 80 milliGRAM(s) Oral at bedtime  captopril 25 milliGRAM(s) Oral two times a day  cefuroxime  IVPB 1500 milliGRAM(s) IV Intermittent once  chlorhexidine 0.12% Liquid 30 milliLiter(s) Swish and Spit once  chlorhexidine 4% Liquid 1 Application(s) Topical once  influenza   Vaccine 0.5 milliLiter(s) IntraMuscular once  insulin lispro (HumaLOG) corrective regimen sliding scale   SubCutaneous three times a day before meals  insulin lispro (HumaLOG) corrective regimen sliding scale   SubCutaneous at bedtime  metoprolol tartrate 12.5 milliGRAM(s) Oral two times a day  nicotine -  14 mG/24Hr(s) Patch 1 patch Transdermal daily      Vital Signs Last 24 Hrs  T(C): 36.7 (09-12-19 @ 19:45), Max: 36.8 (09-12-19 @ 08:48)  T(F): 98 (09-12-19 @ 19:45), Max: 98.3 (09-12-19 @ 08:48)  HR: 69 (09-12-19 @ 20:45) (55 - 78)  BP: 173/81 (09-12-19 @ 20:45) (155/74 - 196/83)  RR: 18 (09-12-19 @ 19:45) (17 - 18)  SpO2: 99% (09-12-19 @ 19:45) (97% - 99%)             Height (cm): 170.2    Weight (kg): 72.6    BMI (kg/m2): 25.1    (12 Sep 2019 19:45)      Allergies: No Known Allergies      SOCIAL HISTORY:   Traveling from [facility], residing at his sister's place in   Smoker: [ ] Yes  [X] No                 Pt denies  ETOH use: [ ] Yes  [X] No             Pt denies  Ilicit Drug use:  [ ] Yes  [X] No     Pt denies    FAMILY HISTORY:  FHx: hyperlipidemia  FHx: hypertension  FH: type 2 diabetes  Family history of breast cancer in sister  FH: coronary artery disease      Review of Systems  GENERAL:  no weakness, fatigue, fevers or chills  NEURO: no dizziness, numbness, tingling or weakness  SKIN: no itching, burning, rashes, or lesions   HEENT: no visual changes;  no headache, no vertigo, no recent colds  RESPIRATORY: no shortness of breath, no cough, sputum, wheezing  CARDIOVASCULAR:  no chest pain,  or palpitations  GI: no abd pain. no N/V/D.  PERIPHERAL VASCULAR: no swelling, no tenderness, no erythema      PHYSICAL EXAM  General: Well nourished, well developed, NAD.                                              Neuro: Normal exam oriented to person/place & time with no focal motor or sensory  deficits.                    Eyes: Normal exam of conjunctiva & lids, pupils equally reactive.   ENT: Normal exam of nasal/oral mucosa with absence of cyanosis.   Neck: Normal exam of jugular veins, trachea & thyroid.   Chest: Normal lung exam with good air movement absence of wheezes, rales, or rhonchi.                                                                         CV:  Auscultation: normal S1S2, RRR   Carotids: No Bruits[X]  Abdominal Aorta: normal [X] nonpalpable[X]                                                                         GI: Normal exam of abdomen with no noted masses or tenderness. +[id number]                                                                                            Extremities: Normal no evidence of cyanosis or deformity, Edema: none  Lower Extremity Pulses: Right[+2DP] Left[+2DP] Varicosities[none]  SKIN : Normal exam to inspection & palpation. Right radial incision w/DSD and Right femoral incision CDI no bleeding, no hematoma                                                          LABS:                        15.5   9.0   )-----------( 111      ( 12 Sep 2019 09:34 )             45.5     140  |  99  |  11  ----------------------------<  264<H>  4.0   |  25  |  0.94    AST  13  /  ALT  15  /  AlkPhos  85  09-12    PT/INR/PTT - ( 12 Sep 2019 09:34 )   PT: 12.2 sec;   INR: 1.06 ratio    PTT:24.7 sec      Cardiac Cath: 9/12/19 (prelim) LAD 95%Dlagonal 95%CX prox 90% OM 90% % via RRA band failed , RFA sheath       TTE: pending

## 2019-09-12 NOTE — ED ADULT NURSE NOTE - MUSCULOSKELETAL WDL
SAC/needs device/independent
Full range of motion of upper and lower extremities, no joint tenderness/swelling.

## 2019-09-12 NOTE — H&P ADULT - NSICDXPASTSURGICALHX_GEN_ALL_CORE_FT
PAST SURGICAL HISTORY:  No significant past surgical history PAST SURGICAL HISTORY:  H/O elbow surgery Right elbow surgery 2007

## 2019-09-12 NOTE — H&P ADULT - PROBLEM SELECTOR PLAN 3
On Metformin at home. Does not know dose. States he checks BGs daily, and they are usually around 120.  on presentation  - FSG  - SSI

## 2019-09-12 NOTE — H&P ADULT - PROBLEM SELECTOR PLAN 1
Worsening non-exertional chest pain x 6 days without troponin elevation or change. Coronary CT showed significant calcification in left main, LAD, and RCA. Coronary catheterization 9/12 shows triple vessel disease.  - Cardiology consulted, appreciate recs  - ASA 81 mg  - Atorvastatin 80 mg  - Consult CT Surgery in AM for CABG eval

## 2019-09-12 NOTE — ED PROVIDER NOTE - ST/T WAVE
no st elevations or depressions, no pathologic t wave inversions st depressions in v5-6, no significant st elevations; no pathologic t wave inversions

## 2019-09-12 NOTE — ED ADULT NURSE NOTE - NSIMPLEMENTINTERV_GEN_ALL_ED
Implemented All Universal Safety Interventions:  [place] to call system. Call bell, personal items and telephone within reach. Instruct patient to call for assistance. Room bathroom lighting operational. Non-slip footwear when patient is off stretcher. Physically safe environment: no spills, clutter or unnecessary equipment. Stretcher in lowest position, wheels locked, appropriate side rails in place.

## 2019-09-12 NOTE — ED ADULT NURSE NOTE - OBJECTIVE STATEMENT
64 yrs old male with h/o htn and dm , present to the ER for left sided chest pain that started last week Friday. As per pt  he "ran out" of his htn meds from [facility] . Pt reported that he started to experience chest pain after flying from [facility] last week Friday. As per pt pain has been on and off and sharp in quality. Pt reports pain level of 8/10 on pain scale. Pt report that he takes Aspirin daily . Smokes 2 pack of cigarettes daily.

## 2019-09-12 NOTE — ED PROVIDER NOTE - ATTENDING CONTRIBUTION TO CARE
63y/o M with h/o HTN, DM2 (metformin) p/w left sided chest pain, occurring intermittently since Friday (6 days ago); occurring at rest, not a/w activity, exertion or food.  Pain sharp, located in left side of chest, non-radiating, and seems to resolve after 1-2 hours with no discernable relieving factors.  No cough.  no fever.  Pain worse since approximately 3AM this morning, near constant since then, though for past 2 hours it felt "sharper" than previously.  No SOB, no dyspnea on exertion, no fever/chills, no nausea/vomiting, no back pain, no h/o VTE/DVT/PE, no h/o cancer, no h/o recent surgery/hospitalization; +recent travel (flew back from [facility] 6 days ago; states pain started while on the plane).  +smoker (2ppd for 40+ years).  +Fh/o CAD    On Physical Exam:  General: well appearing, in NAD, speaking clearly in full sentences and without difficulty; cooperative with exam  HEENT: PERRL, MMM  Neck: no neck tenderness, no nuchal rigidity  Cardiac: normal s1, s2; RRR; no MGR  Lungs: CTABL  Abdomen: soft nontender/nondistended  : no bladder tenderness or distension  Skin: intact, no rash  Extremities: no peripheral edema, no gross deformities  Neuro: no gross neurologic deficits     ECG: sinus with inverted p-waves and borderline st depressions in v5-6, no significant st elevations.    A/P: chest pain concerning for possible ACS/CAD I attest to both the medical student and ACP.    65y/o M with h/o HTN, DM2 (metformin) p/w left sided chest pain, occurring intermittently since Friday (6 days ago); occurring at rest, not a/w activity, exertion or food.  Pain sharp, located in left side of chest, non-radiating, and seems to resolve after 1-2 hours with no discernable relieving factors.  No cough.  no fever.  Pain worse since approximately 3AM this morning, near constant since then, though for past 2 hours it felt "sharper" than previously.  No SOB, no dyspnea on exertion, no fever/chills, no nausea/vomiting, no back pain, no h/o VTE/DVT/PE, no h/o cancer, no h/o recent surgery/hospitalization; +recent travel (flew back from [facility] 6 days ago; states pain started while on the plane).  +smoker (2ppd for 40+ years).  +Fh/o CAD    On Physical Exam:  General: well appearing, in NAD, speaking clearly in full sentences and without difficulty; cooperative with exam  HEENT: PERRL, MMM  Neck: no neck tenderness, no nuchal rigidity  Cardiac: normal s1, s2; RRR; no MGR  Lungs: CTABL  Abdomen: soft nontender/nondistended  : no bladder tenderness or distension  Skin: intact, no rash  Extremities: no peripheral edema, no gross deformities  Neuro: no gross neurologic deficits     ECG: sinus with inverted p-waves and borderline st depressions in v5-6, no significant st elevations.    A/P: chest pain concerning for possible ACS/CAD

## 2019-09-12 NOTE — CONSULT NOTE ADULT - ASSESSMENT
64 M w/ HTN and T2DM p/w angina w/ CT coronaries concerning for TVD.    #CAD  -Plan for [facility] today, will need CT surgery eval for CABG if angiogram consistent w/ TVD.  -Start asa and lipitor 80mg. Would not treat for ACS at this time as pt chest pain free currently w/ negative troponin for symptoms that have been going on for at least the past week.    Alonzo Rodriguez PGY4  [phone]  All Cardiology service information can be found 24/7 on [email or web address], password: [email or web address] 64 M w/ HTN and T2DM.  P/W unstable angina.  CTA of  coronaries c/w  TVD with L main involvement.      REC:  #1.  CAD  - Plan for [facility] today, will need CT surgery eval for CABG if angiogram consistent w/ TVD/L main dz.  - Start asa and Lipitor 80mg qd.   - Would not treat for ACS at this time as pt chest pain free currently w/ negative troponin despite symptoms that have been going on for at least the past week.    2.  HTN  - continue captopril      Alonzo Rodriguez PGY4  [phone]  All Cardiology service information can be found 24/7 on [email or web address], password: [email or web address] M.[email or web address].  Cardiology Attending, Consult Service  [phone]

## 2019-09-12 NOTE — ED PROCEDURE NOTE - NS ED ATTENDING STATEMENT MOD
Attending Only
I have personally performed a face to face diagnostic evaluation on this patient. I have reviewed the ACP note and agree with the history, exam and plan of care, except as noted.
I have personally performed a face to face diagnostic evaluation on this patient. I have reviewed the ACP note and agree with the history, exam and plan of care, except as noted.

## 2019-09-12 NOTE — H&P ADULT - PROBLEM SELECTOR PLAN 4
Has been smoking for over 40 years. Expresses desire to quit.  - Nicotine patch Has been smoking for over 40 years. Expresses desire to quit. counselled on smoking cessation  - Nicotine patch

## 2019-09-12 NOTE — H&P ADULT - NSHPREVIEWOFSYSTEMS_GEN_ALL_CORE
CONSTITUTIONAL: No weakness, fevers or chills  EYES/ENT: No visual changes;  No vertigo or throat pain   NECK: No pain or stiffness  RESPIRATORY: +Cough. No wheezing, hemoptysis; No shortness of breath  CARDIOVASCULAR: No palpitations or lower extremity edema  GASTROINTESTINAL: No abdominal or epigastric pain. No nausea, vomiting, or hematemesis; No diarrhea or constipation. No melena or hematochezia.  GENITOURINARY: No dysuria, frequency or hematuria  NEUROLOGICAL: No numbness or weakness  SKIN: No itching, burning, rashes, or lesions   All other review of systems is negative unless indicated above. CONSTITUTIONAL: No weakness, fevers or chills  EYES/ENT: No visual changes;  No vertigo or throat pain   NECK: + pain associated with headache  RESPIRATORY: +Cough, nonproductive. No wheezing, hemoptysis; No shortness of breath  CARDIOVASCULAR: No lower extremity edema. + Chest pain  GASTROINTESTINAL: No abdominal or epigastric pain. No nausea, vomiting, or hematemesis; No diarrhea or constipation. No melena or hematochezia.  GENITOURINARY: No dysuria, frequency or hematuria  NEUROLOGICAL: No numbness  SKIN: No itching, burning, rashes, or lesions   All other review of systems is negative unless indicated above.

## 2019-09-12 NOTE — H&P ADULT - PROBLEM SELECTOR PLAN 2
Patient on unknown dose of captopril at home.  - Patient on unknown dose of captopril at home. Ran out of medications on Tuesday, BPs have been 170s-200 at home.  after cath.  - Has not seen a doctor in 12 years.  - Hydralazine 10mg once after cath  - Captopril 25 mg

## 2019-09-13 LAB
ALBUMIN SERPL ELPH-MCNC: 3.8 G/DL — SIGNIFICANT CHANGE UP (ref 3.3–5)
ALP SERPL-CCNC: 83 U/L — SIGNIFICANT CHANGE UP (ref 40–120)
ALT FLD-CCNC: 14 U/L — SIGNIFICANT CHANGE UP (ref 10–45)
ANION GAP SERPL CALC-SCNC: 12 MMOL/L — SIGNIFICANT CHANGE UP (ref 5–17)
APPEARANCE UR: CLEAR — SIGNIFICANT CHANGE UP
AST SERPL-CCNC: 12 U/L — SIGNIFICANT CHANGE UP (ref 10–40)
BASOPHILS # BLD AUTO: 0.1 K/UL — SIGNIFICANT CHANGE UP (ref 0–0.2)
BASOPHILS NFR BLD AUTO: 0.8 % — SIGNIFICANT CHANGE UP (ref 0–2)
BILIRUB SERPL-MCNC: 0.7 MG/DL — SIGNIFICANT CHANGE UP (ref 0.2–1.2)
BILIRUB UR-MCNC: NEGATIVE — SIGNIFICANT CHANGE UP
BLD GP AB SCN SERPL QL: NEGATIVE — SIGNIFICANT CHANGE UP
BUN SERPL-MCNC: 11 MG/DL — SIGNIFICANT CHANGE UP (ref 7–23)
CALCIUM SERPL-MCNC: 9.4 MG/DL — SIGNIFICANT CHANGE UP (ref 8.4–10.5)
CHLORIDE SERPL-SCNC: 101 MMOL/L — SIGNIFICANT CHANGE UP (ref 96–108)
CHOLEST SERPL-MCNC: 189 MG/DL — SIGNIFICANT CHANGE UP (ref 10–199)
CO2 SERPL-SCNC: 25 MMOL/L — SIGNIFICANT CHANGE UP (ref 22–31)
COLOR SPEC: SIGNIFICANT CHANGE UP
CREAT SERPL-MCNC: 0.9 MG/DL — SIGNIFICANT CHANGE UP (ref 0.5–1.3)
DIFF PNL FLD: NEGATIVE — SIGNIFICANT CHANGE UP
EOSINOPHIL # BLD AUTO: 0.5 K/UL — SIGNIFICANT CHANGE UP (ref 0–0.5)
EOSINOPHIL NFR BLD AUTO: 4.5 % — SIGNIFICANT CHANGE UP (ref 0–6)
GLUCOSE BLDC GLUCOMTR-MCNC: 172 MG/DL — HIGH (ref 70–99)
GLUCOSE BLDC GLUCOMTR-MCNC: 185 MG/DL — HIGH (ref 70–99)
GLUCOSE BLDC GLUCOMTR-MCNC: 196 MG/DL — HIGH (ref 70–99)
GLUCOSE SERPL-MCNC: 189 MG/DL — HIGH (ref 70–99)
GLUCOSE UR QL: NEGATIVE — SIGNIFICANT CHANGE UP
HBA1C BLD-MCNC: 9.2 % — HIGH (ref 4–5.6)
HCT VFR BLD CALC: 46.3 % — SIGNIFICANT CHANGE UP (ref 39–50)
HCV AB S/CO SERPL IA: 0.75 S/CO — SIGNIFICANT CHANGE UP (ref 0–0.99)
HCV AB SERPL-IMP: SIGNIFICANT CHANGE UP
HDLC SERPL-MCNC: 25 MG/DL — LOW
HGB BLD-MCNC: 15.6 G/DL — SIGNIFICANT CHANGE UP (ref 13–17)
KETONES UR-MCNC: NEGATIVE — SIGNIFICANT CHANGE UP
LEUKOCYTE ESTERASE UR-ACNC: NEGATIVE — SIGNIFICANT CHANGE UP
LIPID PNL WITH DIRECT LDL SERPL: 126 MG/DL — HIGH
LYMPHOCYTES # BLD AUTO: 2.8 K/UL — SIGNIFICANT CHANGE UP (ref 1–3.3)
LYMPHOCYTES # BLD AUTO: 27.4 % — SIGNIFICANT CHANGE UP (ref 13–44)
MAGNESIUM SERPL-MCNC: 2 MG/DL — SIGNIFICANT CHANGE UP (ref 1.6–2.6)
MCHC RBC-ENTMCNC: 30.7 PG — SIGNIFICANT CHANGE UP (ref 27–34)
MCHC RBC-ENTMCNC: 33.7 GM/DL — SIGNIFICANT CHANGE UP (ref 32–36)
MCV RBC AUTO: 91 FL — SIGNIFICANT CHANGE UP (ref 80–100)
MONOCYTES # BLD AUTO: 0.7 K/UL — SIGNIFICANT CHANGE UP (ref 0–0.9)
MONOCYTES NFR BLD AUTO: 7 % — SIGNIFICANT CHANGE UP (ref 2–14)
MRSA PCR RESULT.: SIGNIFICANT CHANGE UP
NEUTROPHILS # BLD AUTO: 6.1 K/UL — SIGNIFICANT CHANGE UP (ref 1.8–7.4)
NEUTROPHILS NFR BLD AUTO: 60.2 % — SIGNIFICANT CHANGE UP (ref 43–77)
NITRITE UR-MCNC: NEGATIVE — SIGNIFICANT CHANGE UP
PA ADP PRP-ACNC: 141 PRU — LOW (ref 194–417)
PH UR: 7.5 — SIGNIFICANT CHANGE UP (ref 5–8)
PHOSPHATE SERPL-MCNC: 3.2 MG/DL — SIGNIFICANT CHANGE UP (ref 2.5–4.5)
PLATELET # BLD AUTO: 122 K/UL — LOW (ref 150–400)
POTASSIUM SERPL-MCNC: 3.7 MMOL/L — SIGNIFICANT CHANGE UP (ref 3.5–5.3)
POTASSIUM SERPL-SCNC: 3.7 MMOL/L — SIGNIFICANT CHANGE UP (ref 3.5–5.3)
PROT SERPL-MCNC: 6.8 G/DL — SIGNIFICANT CHANGE UP (ref 6–8.3)
PROT UR-MCNC: SIGNIFICANT CHANGE UP
RBC # BLD: 5.09 M/UL — SIGNIFICANT CHANGE UP (ref 4.2–5.8)
RBC # FLD: 12.7 % — SIGNIFICANT CHANGE UP (ref 10.3–14.5)
RH IG SCN BLD-IMP: POSITIVE — SIGNIFICANT CHANGE UP
S AUREUS DNA NOSE QL NAA+PROBE: SIGNIFICANT CHANGE UP
SODIUM SERPL-SCNC: 138 MMOL/L — SIGNIFICANT CHANGE UP (ref 135–145)
SP GR SPEC: 1.04 — HIGH (ref 1.01–1.02)
T3 SERPL-MCNC: 106 NG/DL — SIGNIFICANT CHANGE UP (ref 80–200)
T4 AB SER-ACNC: 8.2 UG/DL — SIGNIFICANT CHANGE UP (ref 4.6–12)
TOTAL CHOLESTEROL/HDL RATIO MEASUREMENT: 7.6 RATIO — SIGNIFICANT CHANGE UP (ref 3.4–9.6)
TRIGL SERPL-MCNC: 189 MG/DL — HIGH (ref 10–149)
TSH SERPL-MCNC: 1.46 UIU/ML — SIGNIFICANT CHANGE UP (ref 0.27–4.2)
UROBILINOGEN FLD QL: NEGATIVE — SIGNIFICANT CHANGE UP
WBC # BLD: 10.1 K/UL — SIGNIFICANT CHANGE UP (ref 3.8–10.5)
WBC # FLD AUTO: 10.1 K/UL — SIGNIFICANT CHANGE UP (ref 3.8–10.5)

## 2019-09-13 PROCEDURE — 99232 SBSQ HOSP IP/OBS MODERATE 35: CPT

## 2019-09-13 PROCEDURE — 93880 EXTRACRANIAL BILAT STUDY: CPT | Mod: 26

## 2019-09-13 PROCEDURE — 93010 ELECTROCARDIOGRAM REPORT: CPT

## 2019-09-13 PROCEDURE — 99233 SBSQ HOSP IP/OBS HIGH 50: CPT

## 2019-09-13 RX ORDER — POTASSIUM CHLORIDE 20 MEQ
10 PACKET (EA) ORAL
Refills: 0 | Status: DISCONTINUED | OUTPATIENT
Start: 2019-09-19 | End: 2019-09-19

## 2019-09-13 RX ORDER — HYDRALAZINE HCL 50 MG
10 TABLET ORAL
Refills: 0 | Status: DISCONTINUED | OUTPATIENT
Start: 2019-09-13 | End: 2019-09-13

## 2019-09-13 RX ORDER — MUPIROCIN 20 MG/G
1 OINTMENT TOPICAL
Refills: 0 | Status: DISCONTINUED | OUTPATIENT
Start: 2019-09-19 | End: 2019-09-21

## 2019-09-13 RX ORDER — DOCUSATE SODIUM 100 MG
100 CAPSULE ORAL THREE TIMES A DAY
Refills: 0 | Status: DISCONTINUED | OUTPATIENT
Start: 2019-09-19 | End: 2019-09-21

## 2019-09-13 RX ORDER — DEXTROSE 50 % IN WATER 50 %
25 SYRINGE (ML) INTRAVENOUS
Refills: 0 | Status: DISCONTINUED | OUTPATIENT
Start: 2019-09-19 | End: 2019-09-21

## 2019-09-13 RX ORDER — SODIUM CHLORIDE 9 MG/ML
1000 INJECTION, SOLUTION INTRAVENOUS
Refills: 0 | Status: DISCONTINUED | OUTPATIENT
Start: 2019-09-19 | End: 2019-09-21

## 2019-09-13 RX ORDER — SODIUM CHLORIDE 9 MG/ML
1000 INJECTION INTRAMUSCULAR; INTRAVENOUS; SUBCUTANEOUS
Refills: 0 | Status: DISCONTINUED | OUTPATIENT
Start: 2019-09-19 | End: 2019-09-21

## 2019-09-13 RX ORDER — HYDRALAZINE HCL 50 MG
10 TABLET ORAL THREE TIMES A DAY
Refills: 0 | Status: DISCONTINUED | OUTPATIENT
Start: 2019-09-13 | End: 2019-09-13

## 2019-09-13 RX ORDER — POTASSIUM CHLORIDE 20 MEQ
40 PACKET (EA) ORAL ONCE
Refills: 0 | Status: COMPLETED | OUTPATIENT
Start: 2019-09-13 | End: 2019-09-13

## 2019-09-13 RX ORDER — DEXTROSE 50 % IN WATER 50 %
50 SYRINGE (ML) INTRAVENOUS
Refills: 0 | Status: DISCONTINUED | OUTPATIENT
Start: 2019-09-19 | End: 2019-09-21

## 2019-09-13 RX ORDER — ASPIRIN/CALCIUM CARB/MAGNESIUM 324 MG
300 TABLET ORAL ONCE
Refills: 0 | Status: DISCONTINUED | OUTPATIENT
Start: 2019-09-19 | End: 2019-09-19

## 2019-09-13 RX ORDER — HYDRALAZINE HCL 50 MG
10 TABLET ORAL
Refills: 0 | Status: DISCONTINUED | OUTPATIENT
Start: 2019-09-13 | End: 2019-09-14

## 2019-09-13 RX ADMIN — ATORVASTATIN CALCIUM 80 MILLIGRAM(S): 80 TABLET, FILM COATED ORAL at 21:13

## 2019-09-13 RX ADMIN — Medication 12.5 MILLIGRAM(S): at 17:31

## 2019-09-13 RX ADMIN — Medication 10 MILLIGRAM(S): at 12:08

## 2019-09-13 RX ADMIN — Medication 1: at 08:36

## 2019-09-13 RX ADMIN — Medication 1: at 14:07

## 2019-09-13 RX ADMIN — CHLORHEXIDINE GLUCONATE 1 APPLICATION(S): 213 SOLUTION TOPICAL at 05:43

## 2019-09-13 RX ADMIN — Medication 1: at 18:01

## 2019-09-13 RX ADMIN — Medication 40 MILLIEQUIVALENT(S): at 18:43

## 2019-09-13 RX ADMIN — Medication 12.5 MILLIGRAM(S): at 11:41

## 2019-09-13 RX ADMIN — Medication 1 PATCH: at 11:41

## 2019-09-13 RX ADMIN — Medication 1 PATCH: at 19:57

## 2019-09-13 RX ADMIN — Medication 10 MILLIGRAM(S): at 18:09

## 2019-09-13 RX ADMIN — Medication 10 MILLIGRAM(S): at 23:15

## 2019-09-13 RX ADMIN — CHLORHEXIDINE GLUCONATE 30 MILLILITER(S): 213 SOLUTION TOPICAL at 05:29

## 2019-09-13 RX ADMIN — Medication 81 MILLIGRAM(S): at 11:41

## 2019-09-13 NOTE — PROGRESS NOTE ADULT - SUBJECTIVE AND OBJECTIVE BOX
Phil Ford, PGY-1  Internal Medicine  Pager [phone] / [phone]    Patient is a 64y old  Male who presents with a chief complaint of Chest Pain (12 Sep 2019 21:33)      SUBJECTIVE / OVERNIGHT EVENTS:    MEDICATIONS  (STANDING):  aspirin enteric coated 81 milliGRAM(s) Oral daily  atorvastatin 80 milliGRAM(s) Oral at bedtime  cefuroxime  IVPB 1500 milliGRAM(s) IV Intermittent once  chlorhexidine 4% Liquid 1 Application(s) Topical once  dextrose 5%. 1000 milliLiter(s) (50 mL/Hr) IV Continuous <Continuous>  dextrose 50% Injectable 12.5 Gram(s) IV Push once  dextrose 50% Injectable 25 Gram(s) IV Push once  dextrose 50% Injectable 25 Gram(s) IV Push once  influenza   Vaccine 0.5 milliLiter(s) IntraMuscular once  insulin lispro (HumaLOG) corrective regimen sliding scale   SubCutaneous three times a day before meals  insulin lispro (HumaLOG) corrective regimen sliding scale   SubCutaneous at bedtime  metoprolol tartrate 12.5 milliGRAM(s) Oral two times a day  nicotine -  14 mG/24Hr(s) Patch 1 patch Transdermal daily    MEDICATIONS  (PRN):  dextrose 40% Gel 15 Gram(s) Oral once PRN Blood Glucose LESS THAN 70 milliGRAM(s)/deciliter  glucagon  Injectable 1 milliGRAM(s) IntraMuscular once PRN Glucose LESS THAN 70 milligrams/deciliter      CAPILLARY BLOOD GLUCOSE      POCT Blood Glucose.: 140 mg/dL (12 Sep 2019 21:37)    I&O's Summary    12 Sep 2019 07:01  -  13 Sep 2019 06:10  --------------------------------------------------------  IN: 240 mL / OUT: 200 mL / NET: 40 mL        PHYSICAL EXAM:  Vital Signs Last 24 Hrs  T(C): 36.4 (19 @ 04:45)  T(F): 97.6 (19 @ 04:45), Max: 98.3 (19 @ 08:48)  HR: 61 (19 @ 04:45) (55 - 78)  BP: 171/92 (19 @ 04:45)  BP(mean): --  RR: 18 (19 @ 04:45) (17 - 18)  SpO2: 99% (19 @ 04:45) (96% - 99%)  Wt(kg): --     @ 07:01  -   @ 06:10  --------------------------------------------------------  IN: 240 mL / OUT: 200 mL / NET: 40 mL      Constitutional: NAD, awake and alert  EYES: EOMI  ENT:  Normal Hearing, no tonsillar exudates   Neck: Soft and supple , no thyromegaly   Respiratory: Breath sounds are clear bilaterally, No wheezing, rales or rhonchi  Cardiovascular: S1 and S2, regular rate and rhythm, no Murmurs, gallops or rubs, no JVD,    Gastrointestinal: Bowel Sounds present, soft, nontender, nondistended, no guarding, no rebound  Extremities: No cyanosis or clubbing; warm to touch  Vascular: 2+ peripheral pulses lower ex  Neurological: No focal deficits, CN II-XII intact bilaterally, sensation to light touch intact in all extremities, gait intact. Pupils are equally reactive to light and symmetrical in size.   Musculoskeletal: 5/5 strength b/l upper and lower extremities; no joint swelling.  Skin: No rashes  Psych: no depression or anhedonia, AAOx3  HEME: no bruises, no nose bleeds      LABS:                        15.6   10.1  )-----------( 122      ( 13 Sep 2019 04:57 )             46.3         138  |  101  |  11  ----------------------------<  189<H>  3.7   |  25  |  0.90    Ca    9.4      13 Sep 2019 04:59  Phos  3.2       Mg     2.0         TPro  6.8  /  Alb  3.8  /  TBili  0.7  /  DBili  x   /  AST  12  /  ALT  14  /  AlkPhos  83      PT/INR - ( 12 Sep 2019 09:34 )   PT: 12.2 sec;   INR: 1.06 ratio         PTT - ( 12 Sep 2019 09:34 )  PTT:24.7 sec      Urinalysis Basic - ( 13 Sep 2019 01:58 )    Color: Light Yellow / Appearance: Clear / S.037 / pH: x  Gluc: x / Ketone: Negative  / Bili: Negative / Urobili: Negative   Blood: x / Protein: Trace / Nitrite: Negative   Leuk Esterase: Negative / RBC: x / WBC x   Sq Epi: x / Non Sq Epi: x / Bacteria: x        RADIOLOGY & ADDITIONAL TESTS:    Imaging Personally Reviewed:    Consultant(s) Notes Reviewed:      Care Discussed with Consultants/Other Providers: Phil Ford, PGY-1  Internal Medicine  Pager [phone] / [phone]    Patient is a 64y old  Male who presents with a chief complaint of Chest Pain (12 Sep 2019 21:33)    SUBJECTIVE / OVERNIGHT EVENTS:  No acute events overnight. Patient experienced no chest pain episodes overnight. Complains of mild abdominal pain. No back pain, leg pain, or bleeding from femoral cath insertion site. Cough is improving.  Denies nausea, vomiting, constipation, diarrhea, fevers, chills, chest pain, SOB.    MEDICATIONS  (STANDING):  aspirin enteric coated 81 milliGRAM(s) Oral daily  atorvastatin 80 milliGRAM(s) Oral at bedtime  cefuroxime  IVPB 1500 milliGRAM(s) IV Intermittent once  chlorhexidine 4% Liquid 1 Application(s) Topical once  dextrose 5%. 1000 milliLiter(s) (50 mL/Hr) IV Continuous <Continuous>  dextrose 50% Injectable 12.5 Gram(s) IV Push once  dextrose 50% Injectable 25 Gram(s) IV Push once  dextrose 50% Injectable 25 Gram(s) IV Push once  influenza   Vaccine 0.5 milliLiter(s) IntraMuscular once  insulin lispro (HumaLOG) corrective regimen sliding scale   SubCutaneous three times a day before meals  insulin lispro (HumaLOG) corrective regimen sliding scale   SubCutaneous at bedtime  metoprolol tartrate 12.5 milliGRAM(s) Oral two times a day  nicotine -  14 mG/24Hr(s) Patch 1 patch Transdermal daily    MEDICATIONS  (PRN):  dextrose 40% Gel 15 Gram(s) Oral once PRN Blood Glucose LESS THAN 70 milliGRAM(s)/deciliter  glucagon  Injectable 1 milliGRAM(s) IntraMuscular once PRN Glucose LESS THAN 70 milligrams/deciliter      CAPILLARY BLOOD GLUCOSE      POCT Blood Glucose.: 140 mg/dL (12 Sep 2019 21:37)    I&O's Summary    12 Sep 2019 07:01  -  13 Sep 2019 06:10  --------------------------------------------------------  IN: 240 mL / OUT: 200 mL / NET: 40 mL        PHYSICAL EXAM:  Vital Signs Last 24 Hrs  T(C): 36.4 (19 @ 04:45)  T(F): 97.6 (19 @ 04:45), Max: 98.3 (19 @ 08:48)  HR: 61 (19 @ 04:45) (55 - 78)  BP: 171/92 (19 @ 04:45)  BP(mean): --  RR: 18 (19 @ 04:45) (17 - 18)  SpO2: 99% (19 @ 04:45) (96% - 99%)  Wt(kg): --     @ 07:01  -   @ 06:10  --------------------------------------------------------  IN: 240 mL / OUT: 200 mL / NET: 40 mL      Constitutional: NAD, awake and alert  EYES: EOMI, scleral icterus  ENT:  Normal Hearing   Neck: Soft and supple   Respiratory: Breath sounds are clear bilaterally, No wheezing, rales or rhonchi  Cardiovascular: S1 and S2, regular rate and rhythm, no Murmurs, gallops or rubs, no JVD,    Gastrointestinal: Bowel Sounds present, soft, nontender, nondistended, no guarding, no rebound  Extremities: No cyanosis or clubbing; warm to touch  Vascular: 2+ peripheral pulses lower ex  Neurological: No focal deficits, CN II-XII intact bilaterally, sensation to light touch intact in all extremities, gait intact. Pupils are equally reactive to light and symmetrical in size.   Musculoskeletal: no joint swelling.  Skin: No rashes  Psych: AAOx3  HEME: no bruises, no nose bleeds. No bleeding from catheter insertion site.      LABS:                        15.6   10.1  )-----------( 122      ( 13 Sep 2019 04:57 )             46.3         138  |  101  |  11  ----------------------------<  189<H>  3.7   |  25  |  0.90    Ca    9.4      13 Sep 2019 04:59  Phos  3.2       Mg     2.0     09-13    TPro  6.8  /  Alb  3.8  /  TBili  0.7  /  DBili  x   /  AST  12  /  ALT  14  /  AlkPhos  83  09-13    PT/INR - ( 12 Sep 2019 09:34 )   PT: 12.2 sec;   INR: 1.06 ratio      PTT - ( 12 Sep 2019 09:34 )  PTT:24.7 sec    Urinalysis Basic - ( 13 Sep 2019 01:58 )    Color: Light Yellow / Appearance: Clear / S.037 / pH: x  Gluc: x / Ketone: Negative  / Bili: Negative / Urobili: Negative   Blood: x / Protein: Trace / Nitrite: Negative   Leuk Esterase: Negative / RBC: x / WBC x   Sq Epi: x / Non Sq Epi: x / Bacteria: x      RADIOLOGY & ADDITIONAL TESTS:    Imaging Personally Reviewed:  < from: [facility] Duplex Carotid, [place] (19 @ 12:59) >  Impression: Mixed plaque in the mid left common carotid artery creating a   hemodynamically significant lesion. The degree of luminal narrowing here   is estimated at greater than 50% by diameter.    Calcified plaque in the proximal left internal carotid artery creating a   hemodynamically significant lesion. The degree of luminal narrowing here   is estimated at 50-69% by diameter.    Calcified plaque in the right internal carotid artery without duplex   evidence of hemodynamically significant stenosis.    < from: Cardiac Cath Lab - Adult (19 @ 16:18) >  CORONARY VESSELS: The coronary circulation is right dominant.  LM:   --  LM: Normal.  LAD:   --  Proximal LAD: There was a 95 % stenosis.  --  Distal LAD: Angiography showed severe atherosclerosis.  --  D1: There was a 90 % stenosis.  CX:   --  Proximal circumflex: There was a 90 % stenosis.  --  OM1: There was a 95 % stenosis.  RCA:   --  Proximal RCA: There was a 100 % stenosis.  COMPLICATIONS: There were no complications.  DIAGNOSTIC RECOMMENDATIONS: Consultation with a cardiac surgeon will be  obtained for coronary artery bypass grafting.    < end of copied text >  < end of copied text >    < from: CT Head No Cont (19 @ 22:45) >  Volume loss with microvascular disease, no acute hemorrhage or midline   shift there are remote and age indeterminate lacunar infarcts. If   symptoms persist consider follow-up head CT or MR if no contraindications.    Contrast present from CT angiogram. Tortuous vessels, likely hypertensive   related, with vascular calcification of carotid siphons. Retention cysts   and polyps throughout the paranasal sinuses, correlate with underlying   sinonasal polyposis. Right mastoid tip opacification with air-fluid level.      < end of copied text >      Consultant(s) Notes Reviewed:    Cardiothoracic Surgery    Care Discussed with Consultants/Other Providers:  CT Surgery - Was scheduled for CABG today, however P2Y12 level found to be low, indicating patient may have received Brillinta or Plavix, placing patient at higher risk for bleeding. Surgery rescheduled for early next week.

## 2019-09-13 NOTE — PROGRESS NOTE ADULT - PROBLEM SELECTOR PLAN 2
Patient on unknown dose of captopril at home. Ran out of medications on Tuesday, BPs have been 170s-200 at home.  after cath.  - Has not seen a doctor in 12 years.  - Hydralazine 10mg once after cath  - Captopril 25 mg Patient on unknown dose of captopril at home. Ran out of medications on Tuesday, BPs have been 170s-200 at home.  after cath. BPs persistently elevated.  - Captopril held by CT surgery, appreciate recs for BP management. Has not seen a doctor in 12 years.  - Started Hydralazine 10 mg QID  - Metoprolol 12.5 mg BID

## 2019-09-13 NOTE — PROGRESS NOTE ADULT - SUBJECTIVE AND OBJECTIVE BOX
Consult Follow Up Note  Referring: Luis Fernando  Reason: CP  HPI:  64 M w/ HTN and T2DM, p/w L substernal chest pressure on and off since returning from a trip to [facility] last week.  Presented to the hospital last night as the pain was not abating. Denies any chest pain at present.  Pain has been occurring intermittently and is non-exertional and non-radiating. Pt w/ extensive family hx of CAD and currently smokes 2ppd.     ===========================  Interval Events  - Found to have 3vCAD  - Remains hypertensive  - No reported worsening CP/Palps/SOB\  ===========================          PMHx:   Type 2 diabetes mellitus with other neurologic complication, without long-term current use of insulin  Hypertension, unspecified type      PSHx: non-contributory      Allergies:  No Known Allergies      Home Meds: Metformin, captopril       FAMILY HISTORY: CAD in mother and father      Social History:  +2ppd smoking hx, no alcohol, or IVDU    REVIEW OF SYSTEMS:  CONSTITUTIONAL: No weakness, fevers or chills  EYES/ENT: No visual changes;  No dysphagia  NECK: No pain or stiffness  RESPIRATORY: No cough, wheezing, hemoptysis; No shortness of breath  CARDIOVASCULAR: +chest pain  GASTROINTESTINAL: No abdominal or epigastric pain. No nausea, vomiting, or hematemesis; No diarrhea or constipation. No melena or hematochezia.  BACK: No back pain  GENITOURINARY: No dysuria, frequency or hematuria  NEUROLOGICAL: No numbness or weakness  SKIN: No itching, burning, rashes, or lesions   All other review of systems is negative unless indicated above.      Physical Exam:  T(F): 98.3 (09-12), Max: 98.3 (09-12)  HR: 55 (09-12) (55 - 78)  BP: 160/78 (09-12) (160/78 - 196/83)  RR: 17 (09-12)  SpO2: 97% (09-12)    GENERAL: No acute distress  HEAD:  Atraumatic, Normocephalic  ENT: EOMI, PERRLA, conjunctiva and sclera clear, Neck supple, No JVD, moist mucosa  CHEST/LUNG: Clear to auscultation bilaterally; No wheeze, equal breath sounds bilaterally   BACK: No spinal tenderness  HEART: Regular rate and rhythm; No murmurs, rubs, or gallops  ABDOMEN: Soft, Nontender, Nondistended; Bowel sounds present  EXTREMITIES:  No clubbing, cyanosis, or edema  PSYCH: Nl behavior, nl affect  NEUROLOGY: AAOx3, non-focal, cranial nerves intact  SKIN: Normal color, No rashes or lesions    Labs Personally Reviewed 9/13/2019      12 Lead ECG (09.12.19 @ 08:55)   NSR at 82 BPM   P-R Interval 148 ms, QRS Duration 90 ms, Q-T Interval 382 ms   Axis -29 degrees   MINIMAL VOLTAGE CRITERIA FOR LVH, MAY BE NORMAL VARIANT, NONSPECIFIC ST AND T WAVE ABNORMALITY      CT Heart with Coronaries (09.12.19 @ 12:09) >  CORONARY ARTERY ANGIOGRAM FINDINGS:    Left main: The left main is a normal caliber vessel which gives rise to the LAD and circumflex arteries. Mixed plaques within left main with moderate (approximately 60-70%) stenosis.    LAD: The LAD is a normal caliber vessel. Mixed plaques within the proximal and mid LAD with severe (approximately 80-90%) stenosis. Noncalcified plaques through the distal LAD with mild (approximately 30-40%) stenosis.  Mixed plaques within the proximal D1 and through the course of the D2 branch. Evaluation of the degree of stenosis is difficult secondary to small caliber of the vessels but grossly patent.    LCX: The circumflex artery is a small caliber vessel. Mixed plaques within proximal and distal LCx with severe (approximately 80-90%) stenosis.    RCA: The right coronary artery is dominant giving rise to the posterior descending artery and a posterolateral branch. Mixed plaques within the proximal RCA with severe (100%) stenosis. There is reconstitution of the mid and distal RCA. Noncalcified plaques within the mid RCA with moderate  (approximately 70%) stenosis. Mixed plaques within the distal RCA with  mild (approximately 30-40%) stenosis.    DOMINANCE: Right coronary artery.        Xray Chest 2 Views PA/Lat (09.12.19 @ 09:45) >  INTERPRETATION:  HISTORY: Chest pain  COMPARISON: None.  FINDINGS:  The cardiac silhouette is normal in size. There are no focal consolidations or pleural effusions. The hilar and mediastinal structures appear unremarkable. The osseous structures are intact.    IMPRESSION: Clear lungs.    ORION GOMEZ M.D., ATTENDING RADIOLOGIST  This document has been electronically signed. Sep 12 2019 10:07AM      LABS:                15.5   9.0   )-----------( 111      ( 12 Sep 2019 09:34 )             45.5     09-12  140  |  99  |  11  ----------------------------<  264<H>  4.0   |  25  |  0.94    Ca    9.6      12 Sep 2019 09:34    TPro  7.1  /  Alb  4.0  /  TBili  0.7  /  DBili  x   /  AST  13  /  ALT  15  /  AlkPhos  85  09-12    PT/INR - ( 12 Sep 2019 09:34 )   PT: 12.2 sec;   INR: 1.06 ratio    PTT - ( 12 Sep 2019 09:34 )  PTT:24.7 sec    CARDIAC MARKERS ( 12 Sep 2019 09:34 ) 19 ng/L / x     / x     / x     / x     / x        Assessment and Recommendation:   · Assessment		  64 M w/ HTN and T2DM.  P/W unstable angina.  CTA of  coronaries c/w  TVD with L main involvement.      REC:  CAD  - Severe 3vDisease  - Continue ASA  - Continue Lipitor  - Captopril  - Change hydralazine to 25Q8  - Increase Metoprolol to 25 BID  - Continue Nicotine Patch

## 2019-09-13 NOTE — PROGRESS NOTE ADULT - ASSESSMENT
64 year-old [place] male with history of hypertension, T2DM, and current smoker admitted with unstable angina found on coronary catheterization to have triple vessel disease.

## 2019-09-13 NOTE — PROGRESS NOTE ADULT - PROBLEM SELECTOR PLAN 1
Worsening non-exertional chest pain x 6 days without troponin elevation or change. Coronary CT showed significant calcification in left main, LAD, and RCA. Coronary catheterization 9/12 shows triple vessel disease.  - Cardiology consulted, appreciate recs  - ASA 81 mg  - Atorvastatin 80 mg  - Consult CT Surgery in AM for CABG eval Worsening non-exertional chest pain x 6 days without troponin elevation or change. Coronary CT showed significant calcification in left main, LAD, and RCA. Coronary catheterization 9/12 shows triple vessel disease. Echo 60-65% (9/19).  - Cardiology consulted, appreciate recs  - ASA 81 mg  - Atorvastatin 80 mg  - CT surg following, planning CABG for Tuesday. Was planned for today, but P2Y12 level too low.

## 2019-09-13 NOTE — PROGRESS NOTE ADULT - PROBLEM SELECTOR PLAN 3
On Metformin at home. Does not know dose. States he checks BGs daily, and they are usually around 120.  on presentation  - FSG  - SSI On Metformin at home. Does not know dose. States he checks BGs daily, and they are usually around 120.  on presentation. HbA1c 9.2%  - FSG  - SSI

## 2019-09-14 LAB
ANION GAP SERPL CALC-SCNC: 13 MMOL/L — SIGNIFICANT CHANGE UP (ref 5–17)
BUN SERPL-MCNC: 12 MG/DL — SIGNIFICANT CHANGE UP (ref 7–23)
CALCIUM SERPL-MCNC: 9.5 MG/DL — SIGNIFICANT CHANGE UP (ref 8.4–10.5)
CHLORIDE SERPL-SCNC: 101 MMOL/L — SIGNIFICANT CHANGE UP (ref 96–108)
CO2 SERPL-SCNC: 25 MMOL/L — SIGNIFICANT CHANGE UP (ref 22–31)
CREAT SERPL-MCNC: 0.95 MG/DL — SIGNIFICANT CHANGE UP (ref 0.5–1.3)
GLUCOSE BLDC GLUCOMTR-MCNC: 150 MG/DL — HIGH (ref 70–99)
GLUCOSE BLDC GLUCOMTR-MCNC: 158 MG/DL — HIGH (ref 70–99)
GLUCOSE BLDC GLUCOMTR-MCNC: 189 MG/DL — HIGH (ref 70–99)
GLUCOSE BLDC GLUCOMTR-MCNC: 195 MG/DL — HIGH (ref 70–99)
GLUCOSE SERPL-MCNC: 160 MG/DL — HIGH (ref 70–99)
HCT VFR BLD CALC: 47.4 % — SIGNIFICANT CHANGE UP (ref 39–50)
HGB BLD-MCNC: 15 G/DL — SIGNIFICANT CHANGE UP (ref 13–17)
MAGNESIUM SERPL-MCNC: 2 MG/DL — SIGNIFICANT CHANGE UP (ref 1.6–2.6)
MCHC RBC-ENTMCNC: 29.1 PG — SIGNIFICANT CHANGE UP (ref 27–34)
MCHC RBC-ENTMCNC: 31.7 GM/DL — LOW (ref 32–36)
MCV RBC AUTO: 91.7 FL — SIGNIFICANT CHANGE UP (ref 80–100)
PHOSPHATE SERPL-MCNC: 3.5 MG/DL — SIGNIFICANT CHANGE UP (ref 2.5–4.5)
PLATELET # BLD AUTO: 120 K/UL — LOW (ref 150–400)
POTASSIUM SERPL-MCNC: 3.7 MMOL/L — SIGNIFICANT CHANGE UP (ref 3.5–5.3)
POTASSIUM SERPL-SCNC: 3.7 MMOL/L — SIGNIFICANT CHANGE UP (ref 3.5–5.3)
RBC # BLD: 5.17 M/UL — SIGNIFICANT CHANGE UP (ref 4.2–5.8)
RBC # FLD: 12.8 % — SIGNIFICANT CHANGE UP (ref 10.3–14.5)
SODIUM SERPL-SCNC: 139 MMOL/L — SIGNIFICANT CHANGE UP (ref 135–145)
WBC # BLD: 8.6 K/UL — SIGNIFICANT CHANGE UP (ref 3.8–10.5)
WBC # FLD AUTO: 8.6 K/UL — SIGNIFICANT CHANGE UP (ref 3.8–10.5)

## 2019-09-14 PROCEDURE — 99232 SBSQ HOSP IP/OBS MODERATE 35: CPT | Mod: GC

## 2019-09-14 PROCEDURE — 99233 SBSQ HOSP IP/OBS HIGH 50: CPT

## 2019-09-14 RX ORDER — METOPROLOL TARTRATE 50 MG
25 TABLET ORAL
Refills: 0 | Status: DISCONTINUED | OUTPATIENT
Start: 2019-09-14 | End: 2019-09-19

## 2019-09-14 RX ORDER — HYDRALAZINE HCL 50 MG
25 TABLET ORAL EVERY 8 HOURS
Refills: 0 | Status: DISCONTINUED | OUTPATIENT
Start: 2019-09-14 | End: 2019-09-15

## 2019-09-14 RX ADMIN — Medication 1 PATCH: at 20:19

## 2019-09-14 RX ADMIN — Medication 1: at 12:40

## 2019-09-14 RX ADMIN — Medication 12.5 MILLIGRAM(S): at 05:29

## 2019-09-14 RX ADMIN — Medication 1 PATCH: at 10:12

## 2019-09-14 RX ADMIN — Medication 1 PATCH: at 08:52

## 2019-09-14 RX ADMIN — Medication 81 MILLIGRAM(S): at 10:12

## 2019-09-14 RX ADMIN — Medication 25 MILLIGRAM(S): at 16:34

## 2019-09-14 RX ADMIN — Medication 1 PATCH: at 10:11

## 2019-09-14 RX ADMIN — Medication 1: at 17:36

## 2019-09-14 RX ADMIN — Medication 25 MILLIGRAM(S): at 12:40

## 2019-09-14 RX ADMIN — Medication 25 MILLIGRAM(S): at 21:21

## 2019-09-14 RX ADMIN — ATORVASTATIN CALCIUM 80 MILLIGRAM(S): 80 TABLET, FILM COATED ORAL at 20:33

## 2019-09-14 RX ADMIN — Medication 10 MILLIGRAM(S): at 05:29

## 2019-09-14 NOTE — PROGRESS NOTE ADULT - ASSESSMENT
64 year-old [place] male with history of hypertension, T2DM, and current smoker admitted with unstable angina found on coronary catheterization to have triple vessel disease.

## 2019-09-14 NOTE — PROGRESS NOTE ADULT - SUBJECTIVE AND OBJECTIVE BOX
Phil Ford, PGY-1  Internal Medicine  Pager [phone] / [phone]    Patient is a 64y old  Male who presents with a chief complaint of Chest Pain (13 Sep 2019 18:23)    SUBJECTIVE / OVERNIGHT EVENTS:  No acute events overnight. Denies episodes of chest pain or headache overnight. Cough is improving.  Denies nausea, vomiting, constipation, diarrhea, fevers, chills, chest pain, SOB.    MEDICATIONS  (STANDING):  aspirin enteric coated 81 milliGRAM(s) Oral daily  atorvastatin 80 milliGRAM(s) Oral at bedtime  cefuroxime  IVPB 1500 milliGRAM(s) IV Intermittent once  dextrose 5%. 1000 milliLiter(s) (50 mL/Hr) IV Continuous <Continuous>  dextrose 50% Injectable 12.5 Gram(s) IV Push once  dextrose 50% Injectable 25 Gram(s) IV Push once  dextrose 50% Injectable 25 Gram(s) IV Push once  hydrALAZINE 25 milliGRAM(s) Oral every 8 hours  influenza   Vaccine 0.5 milliLiter(s) IntraMuscular once  insulin lispro (HumaLOG) corrective regimen sliding scale   SubCutaneous three times a day before meals  insulin lispro (HumaLOG) corrective regimen sliding scale   SubCutaneous at bedtime  metoprolol tartrate 25 milliGRAM(s) Oral two times a day  nicotine -  14 mG/24Hr(s) Patch 1 patch Transdermal daily    MEDICATIONS  (PRN):  dextrose 40% Gel 15 Gram(s) Oral once PRN Blood Glucose LESS THAN 70 milliGRAM(s)/deciliter  glucagon  Injectable 1 milliGRAM(s) IntraMuscular once PRN Glucose LESS THAN 70 milligrams/deciliter      CAPILLARY BLOOD GLUCOSE      POCT Blood Glucose.: 150 mg/dL (14 Sep 2019 08:49)  POCT Blood Glucose.: 196 mg/dL (13 Sep 2019 21:32)  POCT Blood Glucose.: 185 mg/dL (13 Sep 2019 17:52)  POCT Blood Glucose.: 172 mg/dL (13 Sep 2019 14:01)    I&O's Summary    13 Sep 2019 07:01  -  14 Sep 2019 07:00  --------------------------------------------------------  IN: 850 mL / OUT: 0 mL / NET: 850 mL    14 Sep 2019 07:  -  14 Sep 2019 12:24  --------------------------------------------------------  IN: 360 mL / OUT: 0 mL / NET: 360 mL        PHYSICAL EXAM:  Vital Signs Last 24 Hrs  T(C): 36.6 (19 @ 04:32)  T(F): 97.9 (19 @ 04:32), Max: 98.5 (19 @ 17:26)  HR: 67 (19 @ 04:32) (67 - 76)  BP: 181/101 (19 @ 04:32)  BP(mean): --  RR: 18 (19 @ 04:32) (18 - 18)  SpO2: 96% (19 @ 04:32) (96% - 96%)  Wt(kg): --     @ 07:  -   @ 07:00  --------------------------------------------------------  IN: 850 mL / OUT: 0 mL / NET: 850 mL     @ 07:  -   @ 12:24  --------------------------------------------------------  IN: 360 mL / OUT: 0 mL / NET: 360 mL        Constitutional: NAD, awake and alert  EYES: EOMI, scleral icterus  ENT:  Normal Hearing   Neck: Soft and supple   Respiratory: Breath sounds are clear bilaterally, No wheezing, rales or rhonchi  Cardiovascular: S1 and S2, regular rate and rhythm, no Murmurs, gallops or rubs, no JVD,    Gastrointestinal: Bowel Sounds present, soft, nontender, nondistended, no guarding, no rebound  Extremities: No cyanosis or clubbing; warm to touch  Vascular: 2+ peripheral pulses lower ex  Neurological: No focal deficits, CN II-XII intact bilaterally, sensation to light touch intact in all extremities, gait intact. Pupils are equally reactive to light and symmetrical in size.   Musculoskeletal: no joint swelling.  Skin: No rashes  Psych: AAOx3  HEME: no bruises, no nose bleeds.       LABS:                        15.0   8.6   )-----------( 120      ( 14 Sep 2019 06:42 )             47.4     -    139  |  101  |  12  ----------------------------<  160<H>  3.7   |  25  |  0.95    Ca    9.5      14 Sep 2019 06:38  Phos  3.5       Mg     2.0         TPro  6.8  /  Alb  3.8  /  TBili  0.7  /  DBili  x   /  AST  12  /  ALT  14  /  AlkPhos  83        Urinalysis Basic - ( 13 Sep 2019 01:58 )    Color: Light Yellow / Appearance: Clear / S.037 / pH: x  Gluc: x / Ketone: Negative  / Bili: Negative / Urobili: Negative   Blood: x / Protein: Trace / Nitrite: Negative   Leuk Esterase: Negative / RBC: x / WBC x   Sq Epi: x / Non Sq Epi: x / Bacteria: x    RADIOLOGY & ADDITIONAL TESTS:    Imaging Personally Reviewed:    Consultant(s) Notes Reviewed:    Cardiology    Care Discussed with Consultants/Other Providers: 911 or go to the nearest Emergency Room

## 2019-09-14 NOTE — PROGRESS NOTE ADULT - SUBJECTIVE AND OBJECTIVE BOX
Consult Follow Up Note  Referring: Luis Fernando  Reason: CP  HPI:  64 M w/ HTN and T2DM, p/w L substernal chest pressure on and off since returning from a trip to [facility] last week.  Presented to the hospital last night as the pain was not abating. Denies any chest pain at present.  Pain has been occurring intermittently and is non-exertional and non-radiating. Pt w/ extensive family hx of CAD and currently smokes 2ppd.     ===========================  Interval Events  - Found to have 3vCAD  - Remains hypertensive, but improved  - No reported worsening CP/Palps/SOB\  ===========================          PMHx:   Type 2 diabetes mellitus with other neurologic complication, without long-term current use of insulin  Hypertension, unspecified type      PSHx: non-contributory      Allergies:  No Known Allergies      Home Meds: Metformin, captopril       FAMILY HISTORY: CAD in mother and father      Social History:  +2ppd smoking hx, no alcohol, or IVDU    REVIEW OF SYSTEMS:  CONSTITUTIONAL: No weakness, fevers or chills  EYES/ENT: No visual changes;  No dysphagia  NECK: No pain or stiffness  RESPIRATORY: No cough, wheezing, hemoptysis; No shortness of breath  CARDIOVASCULAR: +chest pain  GASTROINTESTINAL: No abdominal or epigastric pain. No nausea, vomiting, or hematemesis; No diarrhea or constipation. No melena or hematochezia.  BACK: No back pain  GENITOURINARY: No dysuria, frequency or hematuria  NEUROLOGICAL: No numbness or weakness  SKIN: No itching, burning, rashes, or lesions   All other review of systems is negative unless indicated above.      Physical Exam:  T(F): 98.3 (09-12), Max: 98.3 (09-12)  HR: 55 (09-12) (55 - 78)  BP: 160/78 (09-12) (160/78 - 196/83)  RR: 17 (09-12)  SpO2: 97% (09-12)    GENERAL: No acute distress  HEAD:  Atraumatic, Normocephalic  ENT: EOMI, PERRLA, conjunctiva and sclera clear, Neck supple, No JVD, moist mucosa  CHEST/LUNG: Clear to auscultation bilaterally; No wheeze, equal breath sounds bilaterally   BACK: No spinal tenderness  HEART: Regular rate and rhythm; No murmurs, rubs, or gallops  ABDOMEN: Soft, Nontender, Nondistended; Bowel sounds present  EXTREMITIES:  No clubbing, cyanosis, or edema  PSYCH: Nl behavior, nl affect  NEUROLOGY: AAOx3, non-focal, cranial nerves intact  SKIN: Normal color, No rashes or lesions    Labs Personally Reviewed 9/14/2019      12 Lead ECG (09.12.19 @ 08:55)   NSR at 82 BPM   P-R Interval 148 ms, QRS Duration 90 ms, Q-T Interval 382 ms   Axis -29 degrees   MINIMAL VOLTAGE CRITERIA FOR LVH, MAY BE NORMAL VARIANT, NONSPECIFIC ST AND T WAVE ABNORMALITY      CT Heart with Coronaries (09.12.19 @ 12:09) >  CORONARY ARTERY ANGIOGRAM FINDINGS:    Left main: The left main is a normal caliber vessel which gives rise to the LAD and circumflex arteries. Mixed plaques within left main with moderate (approximately 60-70%) stenosis.    LAD: The LAD is a normal caliber vessel. Mixed plaques within the proximal and mid LAD with severe (approximately 80-90%) stenosis. Noncalcified plaques through the distal LAD with mild (approximately 30-40%) stenosis.  Mixed plaques within the proximal D1 and through the course of the D2 branch. Evaluation of the degree of stenosis is difficult secondary to small caliber of the vessels but grossly patent.    LCX: The circumflex artery is a small caliber vessel. Mixed plaques within proximal and distal LCx with severe (approximately 80-90%) stenosis.    RCA: The right coronary artery is dominant giving rise to the posterior descending artery and a posterolateral branch. Mixed plaques within the proximal RCA with severe (100%) stenosis. There is reconstitution of the mid and distal RCA. Noncalcified plaques within the mid RCA with moderate  (approximately 70%) stenosis. Mixed plaques within the distal RCA with  mild (approximately 30-40%) stenosis.    DOMINANCE: Right coronary artery.        Xray Chest 2 Views PA/Lat (09.12.19 @ 09:45) >  INTERPRETATION:  HISTORY: Chest pain  COMPARISON: None.  FINDINGS:  The cardiac silhouette is normal in size. There are no focal consolidations or pleural effusions. The hilar and mediastinal structures appear unremarkable. The osseous structures are intact.    IMPRESSION: Clear lungs.    ORION GOMEZ M.D., ATTENDING RADIOLOGIST  This document has been electronically signed. Sep 12 2019 10:07AM      LABS:                15.5   9.0   )-----------( 111      ( 12 Sep 2019 09:34 )             45.5     09-12  140  |  99  |  11  ----------------------------<  264<H>  4.0   |  25  |  0.94    Ca    9.6      12 Sep 2019 09:34    TPro  7.1  /  Alb  4.0  /  TBili  0.7  /  DBili  x   /  AST  13  /  ALT  15  /  AlkPhos  85  09-12    PT/INR - ( 12 Sep 2019 09:34 )   PT: 12.2 sec;   INR: 1.06 ratio    PTT - ( 12 Sep 2019 09:34 )  PTT:24.7 sec    CARDIAC MARKERS ( 12 Sep 2019 09:34 ) 19 ng/L / x     / x     / x     / x     / x        Assessment and Recommendation:   · Assessment		  64 M w/ HTN and T2DM.  P/W unstable angina.  CTA of  coronaries c/w  TVD with L main involvement.      REC:  CAD  - Severe 3vDisease  - Continue ASA  - Continue Lipitor  - Captopril  - Change hydralazine to 50Q8  - Continue Metoprolol 25 BID  - Continue Nicotine Patch

## 2019-09-14 NOTE — PROGRESS NOTE ADULT - PROBLEM SELECTOR PLAN 2
Patient on unknown dose of captopril at home. Ran out of medications on Tuesday, BPs have been 170s-200 at home.  after cath. BPs persistently elevated. Has not seen a doctor in 12 years.  - Captopril held by CT surgery as to avoid ACEI/ARB prior to surgery. Holding.  - Increased Hydralazine to 25 mg TID  - Increased Metoprolol to 25 mg BID

## 2019-09-14 NOTE — PROGRESS NOTE ADULT - PROBLEM SELECTOR PLAN 3
On Metformin at home. Does not know dose. States he checks BGs daily, and they are usually around 120.  HbA1c 9.2% BGs have been in mid 100s. Notes recent unintentional weight loss in the past several months  - FSG  - SSI  - Should be discharged on insulin

## 2019-09-14 NOTE — PROGRESS NOTE ADULT - PROBLEM SELECTOR PLAN 1
Worsening non-exertional chest pain x 6 days without troponin elevation or change. Coronary CT showed significant calcification in left main, LAD, and RCA. Coronary catheterization 9/12 shows triple vessel disease. Echo 60-65% (9/19).  - Cardiology consulted, recommended BP optimization  - ASA 81 mg  - Atorvastatin 80 mg  - CT surg following, planning CABG for Tuesday. Was planned for today, but P2Y12 level too low. Worsening non-exertional chest pain x 6 days without troponin elevation or change. Coronary CT showed significant calcification in left main, LAD, and RCA. Coronary catheterization 9/12 shows triple vessel disease. Echo 60-65% (9/19).  - Cardiology consulted, recommended BP optimization  - ASA 81 mg  - Atorvastatin 80 mg  - CT surg following, planning CABG for Tuesday. Cancelled Friday due to P2Y12 level too low.

## 2019-09-15 LAB
ANION GAP SERPL CALC-SCNC: 13 MMOL/L — SIGNIFICANT CHANGE UP (ref 5–17)
BUN SERPL-MCNC: 15 MG/DL — SIGNIFICANT CHANGE UP (ref 7–23)
CALCIUM SERPL-MCNC: 9.6 MG/DL — SIGNIFICANT CHANGE UP (ref 8.4–10.5)
CHLORIDE SERPL-SCNC: 101 MMOL/L — SIGNIFICANT CHANGE UP (ref 96–108)
CO2 SERPL-SCNC: 25 MMOL/L — SIGNIFICANT CHANGE UP (ref 22–31)
CREAT SERPL-MCNC: 0.97 MG/DL — SIGNIFICANT CHANGE UP (ref 0.5–1.3)
GLUCOSE BLDC GLUCOMTR-MCNC: 156 MG/DL — HIGH (ref 70–99)
GLUCOSE BLDC GLUCOMTR-MCNC: 169 MG/DL — HIGH (ref 70–99)
GLUCOSE BLDC GLUCOMTR-MCNC: 177 MG/DL — HIGH (ref 70–99)
GLUCOSE BLDC GLUCOMTR-MCNC: 196 MG/DL — HIGH (ref 70–99)
GLUCOSE SERPL-MCNC: 169 MG/DL — HIGH (ref 70–99)
HCT VFR BLD CALC: 44.1 % — SIGNIFICANT CHANGE UP (ref 39–50)
HGB BLD-MCNC: 14.8 G/DL — SIGNIFICANT CHANGE UP (ref 13–17)
MAGNESIUM SERPL-MCNC: 1.8 MG/DL — SIGNIFICANT CHANGE UP (ref 1.6–2.6)
MCHC RBC-ENTMCNC: 30.6 PG — SIGNIFICANT CHANGE UP (ref 27–34)
MCHC RBC-ENTMCNC: 33.7 GM/DL — SIGNIFICANT CHANGE UP (ref 32–36)
MCV RBC AUTO: 91 FL — SIGNIFICANT CHANGE UP (ref 80–100)
PHOSPHATE SERPL-MCNC: 3.5 MG/DL — SIGNIFICANT CHANGE UP (ref 2.5–4.5)
PLATELET # BLD AUTO: 120 K/UL — LOW (ref 150–400)
POTASSIUM SERPL-MCNC: 3.7 MMOL/L — SIGNIFICANT CHANGE UP (ref 3.5–5.3)
POTASSIUM SERPL-SCNC: 3.7 MMOL/L — SIGNIFICANT CHANGE UP (ref 3.5–5.3)
RBC # BLD: 4.85 M/UL — SIGNIFICANT CHANGE UP (ref 4.2–5.8)
RBC # FLD: 12.7 % — SIGNIFICANT CHANGE UP (ref 10.3–14.5)
SODIUM SERPL-SCNC: 139 MMOL/L — SIGNIFICANT CHANGE UP (ref 135–145)
WBC # BLD: 9.6 K/UL — SIGNIFICANT CHANGE UP (ref 3.8–10.5)
WBC # FLD AUTO: 9.6 K/UL — SIGNIFICANT CHANGE UP (ref 3.8–10.5)

## 2019-09-15 PROCEDURE — 99233 SBSQ HOSP IP/OBS HIGH 50: CPT

## 2019-09-15 PROCEDURE — 99232 SBSQ HOSP IP/OBS MODERATE 35: CPT | Mod: GC

## 2019-09-15 RX ORDER — POTASSIUM CHLORIDE 20 MEQ
40 PACKET (EA) ORAL ONCE
Refills: 0 | Status: DISCONTINUED | OUTPATIENT
Start: 2019-09-15 | End: 2019-09-15

## 2019-09-15 RX ORDER — HYDRALAZINE HCL 50 MG
50 TABLET ORAL EVERY 8 HOURS
Refills: 0 | Status: DISCONTINUED | OUTPATIENT
Start: 2019-09-15 | End: 2019-09-16

## 2019-09-15 RX ORDER — HYDRALAZINE HCL 50 MG
25 TABLET ORAL ONCE
Refills: 0 | Status: COMPLETED | OUTPATIENT
Start: 2019-09-15 | End: 2019-09-15

## 2019-09-15 RX ORDER — MAGNESIUM SULFATE 500 MG/ML
1 VIAL (ML) INJECTION ONCE
Refills: 0 | Status: COMPLETED | OUTPATIENT
Start: 2019-09-15 | End: 2019-09-15

## 2019-09-15 RX ADMIN — Medication 1: at 13:16

## 2019-09-15 RX ADMIN — Medication 25 MILLIGRAM(S): at 08:37

## 2019-09-15 RX ADMIN — Medication 25 MILLIGRAM(S): at 05:32

## 2019-09-15 RX ADMIN — Medication 1: at 17:48

## 2019-09-15 RX ADMIN — Medication 50 MILLIGRAM(S): at 12:11

## 2019-09-15 RX ADMIN — Medication 1: at 09:07

## 2019-09-15 RX ADMIN — Medication 1 PATCH: at 11:37

## 2019-09-15 RX ADMIN — Medication 25 MILLIGRAM(S): at 16:30

## 2019-09-15 RX ADMIN — Medication 81 MILLIGRAM(S): at 08:37

## 2019-09-15 RX ADMIN — Medication 50 MILLIGRAM(S): at 21:02

## 2019-09-15 RX ADMIN — Medication 100 GRAM(S): at 08:37

## 2019-09-15 RX ADMIN — Medication 1 PATCH: at 19:34

## 2019-09-15 RX ADMIN — Medication 1 PATCH: at 10:12

## 2019-09-15 RX ADMIN — ATORVASTATIN CALCIUM 80 MILLIGRAM(S): 80 TABLET, FILM COATED ORAL at 20:53

## 2019-09-15 RX ADMIN — Medication 1 PATCH: at 08:34

## 2019-09-15 NOTE — PROGRESS NOTE ADULT - ASSESSMENT
64 year-old [place] male with history of hypertension, T2DM, and current smoker admitted with unstable angina found on coronary catheterization to have triple vessel disease. 64 year-old [place] male with history of hypertension, T2DM, and current smoker admitted with unstable angina found on coronary catheterization to have triple vessel disease, pending CABG.

## 2019-09-15 NOTE — PROGRESS NOTE ADULT - PROBLEM SELECTOR PLAN 1
Worsening non-exertional chest pain x 6 days without troponin elevation or change. Coronary CT showed significant calcification in left main, LAD, and RCA. Coronary catheterization 9/12 shows triple vessel disease. Echo 60-65% (9/19).  - Cardiology consulted, recommended BP optimization  - ASA 81 mg  - Atorvastatin 80 mg  - CT surg following, planning CABG for Tuesday. Cancelled Friday due to P2Y12 level too low. Worsening non-exertional chest pain x 6 days without troponin elevation or change. Coronary CT showed significant calcification in left main, LAD, and RCA. Coronary catheterization 9/12 shows triple vessel disease. Echo 60-65% (9/19).  - Cardiology consulted, recommended BP optimization  - ASA 81 mg, Atorvastatin 80 mg  - CT surg following, CABG postponed due to P2Y12 level too low  -CABG planned for Tues 9/17, f/u AM P2Y12 activity in AM Worsening non-exertional chest pain x 6 days without troponin elevation or change. Coronary CT showed significant calcification in left main, LAD, and RCA. Coronary catheterization 9/12 shows triple vessel disease. Echo 60-65% (9/19).  - Cardiology consulted, recommended BP optimization  - ASA 81 mg, Atorvastatin 80 mg  - CT surg following, CABG postponed due to P2Y12 level too low  - CABG planned for Tues 9/17, f/u P2Y12 activity in AM (ordered)

## 2019-09-15 NOTE — PROGRESS NOTE ADULT - SUBJECTIVE AND OBJECTIVE BOX
Internal Medicine Progress Note    =======================================================  CONTACT INFO  Lizette Thompson M.D., PGY-3  Pager: [phone] ([facility]) / [phone] ([facility])    Mon-Fri: pager covered by day team 7am-7pm  After 7:00PM on weekdays and 12:00PM on weekends, page [phone]  =======================================================    Patient is a 64y old  Male who presents with a chief complaint of Chest Pain (14 Sep 2019 23:11)      SUBJECTIVE / OVERNIGHT EVENTS: BP uncontrolled 180s/100 - 150s/70s, increase hydralazine to 50 TID per cards.    MEDICATIONS  (STANDING):  aspirin enteric coated 81 milliGRAM(s) Oral daily  atorvastatin 80 milliGRAM(s) Oral at bedtime  cefuroxime  IVPB 1500 milliGRAM(s) IV Intermittent once  dextrose 5%. 1000 milliLiter(s) (50 mL/Hr) IV Continuous <Continuous>  dextrose 50% Injectable 12.5 Gram(s) IV Push once  dextrose 50% Injectable 25 Gram(s) IV Push once  dextrose 50% Injectable 25 Gram(s) IV Push once  hydrALAZINE 25 milliGRAM(s) Oral every 8 hours  influenza   Vaccine 0.5 milliLiter(s) IntraMuscular once  insulin lispro (HumaLOG) corrective regimen sliding scale   SubCutaneous three times a day before meals  insulin lispro (HumaLOG) corrective regimen sliding scale   SubCutaneous at bedtime  metoprolol tartrate 25 milliGRAM(s) Oral two times a day  nicotine -  14 mG/24Hr(s) Patch 1 patch Transdermal daily    MEDICATIONS  (PRN):  dextrose 40% Gel 15 Gram(s) Oral once PRN Blood Glucose LESS THAN 70 milliGRAM(s)/deciliter  glucagon  Injectable 1 milliGRAM(s) IntraMuscular once PRN Glucose LESS THAN 70 milligrams/deciliter    Vital Signs Last 24 Hrs  T(C): 36.6 (15 Sep 2019 04:19), Max: 37 (14 Sep 2019 20:28)  T(F): 97.8 (15 Sep 2019 04:19), Max: 98.6 (14 Sep 2019 20:28)  HR: 84 (15 Sep 2019 04:19) (69 - 84)  BP: 167/83 (15 Sep 2019 04:19) (155/84 - 183/82)  BP(mean): --  RR: 18 (15 Sep 2019 04:19) (17 - 18)  SpO2: 95% (15 Sep 2019 04:19) (95% - 98%)    CAPILLARY BLOOD GLUCOSE      POCT Blood Glucose.: 195 mg/dL (14 Sep 2019 21:37)  POCT Blood Glucose.: 158 mg/dL (14 Sep 2019 17:28)  POCT Blood Glucose.: 189 mg/dL (14 Sep 2019 12:36)  POCT Blood Glucose.: 150 mg/dL (14 Sep 2019 08:49)    I&O's Summary    14 Sep 2019 07:01  -  15 Sep 2019 07:00  --------------------------------------------------------  IN: 720 mL / OUT: 0 mL / NET: 720 mL        PHYSICAL EXAM  GENERAL: NAD  HEAD:  Atraumatic  EYES: EOMI, PERRLA, conjunctiva and sclera clear  NECK: Supple, No JVD  CHEST/LUNG: Clear to auscultation bilaterally; No wheeze  HEART: Regular rate and rhythm; No murmurs, rubs, or gallops  ABDOMEN: Soft, Nontender, Nondistended; Bowel sounds present  EXTREMITIES:  2+ Peripheral Pulses, No clubbing, cyanosis, or edema  NEURO/PSYCH: AAOx3, nonfocal  SKIN: No rashes or lesions      LABS:                        15.0   8.6   )-----------( 120      ( 14 Sep 2019 06:42 )             47.4     Hemoglobin: 15.0 g/dL (09-14 @ 06:42)  Hemoglobin: 15.6 g/dL (09-13 @ 04:57)  Hemoglobin: 15.5 g/dL (09-12 @ 09:34)    CBC Full  -  ( 14 Sep 2019 06:42 )  WBC Count : 8.6 K/uL  RBC Count : 5.17 M/uL  Hemoglobin : 15.0 g/dL  Hematocrit : 47.4 %  Platelet Count - Automated : 120 K/uL  Mean Cell Volume : 91.7 fl  Mean Cell Hemoglobin : 29.1 pg  Mean Cell Hemoglobin Concentration : 31.7 gm/dL  Auto Neutrophil # : x  Auto Lymphocyte # : x  Auto Monocyte # : x  Auto Eosinophil # : x  Auto Basophil # : x  Auto Neutrophil % : x  Auto Lymphocyte % : x  Auto Monocyte % : x  Auto Eosinophil % : x  Auto Basophil % : x    09-14    139  |  101  |  12  ----------------------------<  160<H>  3.7   |  25  |  0.95    Ca    9.5      14 Sep 2019 06:38  Phos  3.5     09-14  Mg     2.0     09-14      Creatinine Trend: 0.95<--, 0.90<--, 0.94<--      EKG:   MICROBIOLOGY:    IMAGING:      Labs, imaging, EKG personally reviewed     CONSULTS: Internal Medicine Progress Note    =======================================================  CONTACT INFO  Lizette Thompson M.D., PGY-3  Pager: [phone] ([facility]) / [phone] ([facility])    Mon-Fri: pager covered by day team 7am-7pm  After 7:00PM on weekdays and 12:00PM on weekends, page [phone]  =======================================================    Patient is a 64y old  Male who presents with a chief complaint of Chest Pain (14 Sep 2019 23:11)      SUBJECTIVE / OVERNIGHT EVENTS: BP uncontrolled 180s/100 - 150s/70s, increase hydralazine to 50 TID per cards. Tele SR 70-80, PVCs, couplets. Pt denies CP, SOB, F/C, abd pain. Had a normal BM this morning.    MEDICATIONS  (STANDING):  aspirin enteric coated 81 milliGRAM(s) Oral daily  atorvastatin 80 milliGRAM(s) Oral at bedtime  cefuroxime  IVPB 1500 milliGRAM(s) IV Intermittent once  dextrose 5%. 1000 milliLiter(s) (50 mL/Hr) IV Continuous <Continuous>  dextrose 50% Injectable 12.5 Gram(s) IV Push once  dextrose 50% Injectable 25 Gram(s) IV Push once  dextrose 50% Injectable 25 Gram(s) IV Push once  hydrALAZINE 25 milliGRAM(s) Oral every 8 hours  influenza   Vaccine 0.5 milliLiter(s) IntraMuscular once  insulin lispro (HumaLOG) corrective regimen sliding scale   SubCutaneous three times a day before meals  insulin lispro (HumaLOG) corrective regimen sliding scale   SubCutaneous at bedtime  metoprolol tartrate 25 milliGRAM(s) Oral two times a day  nicotine -  14 mG/24Hr(s) Patch 1 patch Transdermal daily    MEDICATIONS  (PRN):  dextrose 40% Gel 15 Gram(s) Oral once PRN Blood Glucose LESS THAN 70 milliGRAM(s)/deciliter  glucagon  Injectable 1 milliGRAM(s) IntraMuscular once PRN Glucose LESS THAN 70 milligrams/deciliter    Vital Signs Last 24 Hrs  T(C): 36.6 (15 Sep 2019 04:19), Max: 37 (14 Sep 2019 20:28)  T(F): 97.8 (15 Sep 2019 04:19), Max: 98.6 (14 Sep 2019 20:28)  HR: 84 (15 Sep 2019 04:19) (69 - 84)  BP: 167/83 (15 Sep 2019 04:19) (155/84 - 183/82)  BP(mean): --  RR: 18 (15 Sep 2019 04:19) (17 - 18)  SpO2: 95% (15 Sep 2019 04:19) (95% - 98%)    CAPILLARY BLOOD GLUCOSE      POCT Blood Glucose.: 195 mg/dL (14 Sep 2019 21:37)  POCT Blood Glucose.: 158 mg/dL (14 Sep 2019 17:28)  POCT Blood Glucose.: 189 mg/dL (14 Sep 2019 12:36)  POCT Blood Glucose.: 150 mg/dL (14 Sep 2019 08:49)    I&O's Summary    14 Sep 2019 07:01  -  15 Sep 2019 07:00  --------------------------------------------------------  IN: 720 mL / OUT: 0 mL / NET: 720 mL        PHYSICAL EXAM  GENERAL: NAD  HEAD:  Atraumatic  EYES: EOMI, PERRLA, conjunctiva and sclera clear  NECK: Supple, No JVD  CHEST/LUNG: Clear to auscultation bilaterally; No wheeze  HEART: Regular rate and rhythm; +systolic murmur, No rubs or gallops  ABDOMEN: Soft, Nontender, Nondistended; Bowel sounds present  EXTREMITIES:  2+ Peripheral Pulses, No clubbing, cyanosis, or edema  NEURO/PSYCH: AAOx3, nonfocal  SKIN: No rashes or lesions      LABS:                        15.0   8.6   )-----------( 120      ( 14 Sep 2019 06:42 )             47.4     Hemoglobin: 15.0 g/dL (09-14 @ 06:42)  Hemoglobin: 15.6 g/dL (09-13 @ 04:57)  Hemoglobin: 15.5 g/dL (09-12 @ 09:34)    CBC Full  -  ( 14 Sep 2019 06:42 )  WBC Count : 8.6 K/uL  RBC Count : 5.17 M/uL  Hemoglobin : 15.0 g/dL  Hematocrit : 47.4 %  Platelet Count - Automated : 120 K/uL  Mean Cell Volume : 91.7 fl  Mean Cell Hemoglobin : 29.1 pg  Mean Cell Hemoglobin Concentration : 31.7 gm/dL  Auto Neutrophil # : x  Auto Lymphocyte # : x  Auto Monocyte # : x  Auto Eosinophil # : x  Auto Basophil # : x  Auto Neutrophil % : x  Auto Lymphocyte % : x  Auto Monocyte % : x  Auto Eosinophil % : x  Auto Basophil % : x    09-14    139  |  101  |  12  ----------------------------<  160<H>  3.7   |  25  |  0.95    Ca    9.5      14 Sep 2019 06:38  Phos  3.5     09-14  Mg     2.0     09-14      Creatinine Trend: 0.95<--, 0.90<--, 0.94<--      EKG:   MICROBIOLOGY:    IMAGING:      Labs, imaging, EKG personally reviewed     CONSULTS: Internal Medicine Progress Note    =======================================================  CONTACT INFO  Lizette Thompson M.D., PGY-3  Pager: [phone] ([facility]) / [phone] ([facility])    Mon-Fri: pager covered by day team 7am-7pm  After 7:00PM on weekdays and 12:00PM on weekends, page [phone]  =======================================================    Patient is a 64y old  Male who presents with a chief complaint of Chest Pain (14 Sep 2019 23:11)      SUBJECTIVE / OVERNIGHT EVENTS: BP uncontrolled 180s/100 - 150s/70s, increase hydralazine to 50 TID per cards. Tele SR 70-80, PVCs, couplets. Pt denies CP, SOB, F/C, abd pain. Had a normal BM this morning.    MEDICATIONS  (STANDING):  aspirin enteric coated 81 milliGRAM(s) Oral daily  atorvastatin 80 milliGRAM(s) Oral at bedtime  cefuroxime  IVPB 1500 milliGRAM(s) IV Intermittent once  dextrose 5%. 1000 milliLiter(s) (50 mL/Hr) IV Continuous <Continuous>  dextrose 50% Injectable 12.5 Gram(s) IV Push once  dextrose 50% Injectable 25 Gram(s) IV Push once  dextrose 50% Injectable 25 Gram(s) IV Push once  hydrALAZINE 25 milliGRAM(s) Oral every 8 hours  influenza   Vaccine 0.5 milliLiter(s) IntraMuscular once  insulin lispro (HumaLOG) corrective regimen sliding scale   SubCutaneous three times a day before meals  insulin lispro (HumaLOG) corrective regimen sliding scale   SubCutaneous at bedtime  metoprolol tartrate 25 milliGRAM(s) Oral two times a day  nicotine -  14 mG/24Hr(s) Patch 1 patch Transdermal daily    MEDICATIONS  (PRN):  dextrose 40% Gel 15 Gram(s) Oral once PRN Blood Glucose LESS THAN 70 milliGRAM(s)/deciliter  glucagon  Injectable 1 milliGRAM(s) IntraMuscular once PRN Glucose LESS THAN 70 milligrams/deciliter    Vital Signs Last 24 Hrs  T(C): 36.6 (15 Sep 2019 04:19), Max: 37 (14 Sep 2019 20:28)  T(F): 97.8 (15 Sep 2019 04:19), Max: 98.6 (14 Sep 2019 20:28)  HR: 84 (15 Sep 2019 04:19) (69 - 84)  BP: 167/83 (15 Sep 2019 04:19) (155/84 - 183/82)  BP(mean): --  RR: 18 (15 Sep 2019 04:19) (17 - 18)  SpO2: 95% (15 Sep 2019 04:19) (95% - 98%)    CAPILLARY BLOOD GLUCOSE      POCT Blood Glucose.: 195 mg/dL (14 Sep 2019 21:37)  POCT Blood Glucose.: 158 mg/dL (14 Sep 2019 17:28)  POCT Blood Glucose.: 189 mg/dL (14 Sep 2019 12:36)  POCT Blood Glucose.: 150 mg/dL (14 Sep 2019 08:49)    I&O's Summary    14 Sep 2019 07:01  -  15 Sep 2019 07:00  --------------------------------------------------------  IN: 720 mL / OUT: 0 mL / NET: 720 mL        PHYSICAL EXAM  GENERAL: NAD, resting comfotably  HEAD:  Atraumatic  EYES: EOMI, PERRLA, conjunctiva and sclera clear  NECK: Supple, No JVD  CHEST/LUNG: Clear to auscultation bilaterally; No wheeze  HEART: Regular rate and rhythm; +systolic murmur, No rubs or gallops  ABDOMEN: Soft, Nontender, Nondistended; Bowel sounds present  EXTREMITIES:  2+ Peripheral Pulses, No clubbing, cyanosis, or edema  NEURO/PSYCH: AAOx3, nonfocal  SKIN: No rashes or lesions      LABS:                        15.0   8.6   )-----------( 120      ( 14 Sep 2019 06:42 )             47.4     Hemoglobin: 15.0 g/dL (09-14 @ 06:42)  Hemoglobin: 15.6 g/dL (09-13 @ 04:57)  Hemoglobin: 15.5 g/dL (09-12 @ 09:34)    CBC Full  -  ( 14 Sep 2019 06:42 )  WBC Count : 8.6 K/uL  RBC Count : 5.17 M/uL  Hemoglobin : 15.0 g/dL  Hematocrit : 47.4 %  Platelet Count - Automated : 120 K/uL  Mean Cell Volume : 91.7 fl  Mean Cell Hemoglobin : 29.1 pg  Mean Cell Hemoglobin Concentration : 31.7 gm/dL  Auto Neutrophil # : x  Auto Lymphocyte # : x  Auto Monocyte # : x  Auto Eosinophil # : x  Auto Basophil # : x  Auto Neutrophil % : x  Auto Lymphocyte % : x  Auto Monocyte % : x  Auto Eosinophil % : x  Auto Basophil % : x    09-14    139  |  101  |  12  ----------------------------<  160<H>  3.7   |  25  |  0.95    Ca    9.5      14 Sep 2019 06:38  Phos  3.5     09-14  Mg     2.0     09-14      Creatinine Trend: 0.95<--, 0.90<--, 0.94<--      EKG:   MICROBIOLOGY:    IMAGING:      Labs, imaging, EKG personally reviewed     CONSULTS: D/w Cardiology, CT surgery

## 2019-09-15 NOTE — PROGRESS NOTE ADULT - PROBLEM SELECTOR PLAN 3
On Metformin at home. Does not know dose. States he checks BGs daily, and they are usually around 120.  HbA1c 9.2% BGs have been in mid 100s. Notes recent unintentional weight loss in the past several months  - FSG  - SSI  - Should be discharged on insulin On Metformin at home. Does not know dose. States he checks BGs daily, and they are usually around 120.  HbA1c 9.2% BGs have been in mid 100s. Notes recent unintentional weight loss in the past several months  - ISS, start insulin as needed  - Should be discharged on insulin

## 2019-09-15 NOTE — DISCHARGE NOTE PROVIDER - HOSPITAL COURSE
64 year-old [place] male with history of T2DM, HTN and current 2ppd smoking who presents with 6 days of left sternal chest pain. States he arrived from [facility] on Friday night, when he felt this pain for the first time. After this, it happened about once per day. Each of these times, he says it lasted about 2-3 minutes and was about a 5-6/10 in intensity. Each time, he was not exerting himself, but doing light activity such as bathing or bending over to pick something up. The pain is located at the left sternal border and is non-radiating. This morning, he was sleeping at about 3am and experienced more severe chest pain that woke him up. This lasted about 30 minutes and was 8/10 in intensity, which is what prompted him to come to the hospital. The pain was never associated with shortness of breath, sweating, or nausea. He has noticed a new cough that has been worse in the past week as well. He has never experienced this chest pain before. He takes metformin for diabetes and captopril for hypertension, but does not know the doses and states that he ran out of these of Tuesday.         He complains of a right-sided headache with posterior right neck pain that has been present for two weeks as well. This is intermittent and lasts for 15 minutes.         ED Course: EKG w nonspecific ST and T wave abnormality and possible ectopic atrial rhythm. Troponin 19-->20. Plt 111. Hb 15.5, WBC 9.0, D-dimer 505, INR 1.06, pTT 24.7, Cr 0.94, . CXR shows clear lungs. CT Coronary was performed to r/o PE, showed significant calcification and no pulmonary embolus. Cardiology consulted, admitted for cardiac cath.         Hospitalization Course: Cardiac cath revealed extensive triple vessel disease. CT surgery was consulted, who planned CABG the morning of 9/13. P2Y12 assay was performed which was low, indicating that at some point the patient likely received a P2Y12 inhibitor. Due to this, the surgery was canceled and rescheduled for 9/17. The patient's home captopril was held due to pending surgery, and the patient experienced very high blood pressures (180-200 SBP). Metoprolol was started on admission and hydralazine was later added, and the dose was increased due to persistently elevated BPs. Head CT was performed, as patient had been complaining of right sided headache, but did not show any acute findings.

## 2019-09-15 NOTE — PROGRESS NOTE ADULT - SUBJECTIVE AND OBJECTIVE BOX
Consult Follow Up Note  Referring: Luis Fernando  Reason: CP/Significant 3vCAD  HPI:  64 M w/ HTN and T2DM, p/w L substernal chest pressure on and off since returning from a trip to [facility] last week.  Presented to the hospital last night as the pain was not abating. Denies any chest pain at present.  Pain has been occurring intermittently and is non-exertional and non-radiating. Pt w/ extensive family hx of CAD and currently smokes 2ppd.    Now with significant 3vCAD awaiting CABG    ===========================  Interval Events  - Found to have 3vCAD  - Remains hypertensive, but improved  - No reported worsening CP/Palps/SOB\  ===========================          PMHx:   Type 2 diabetes mellitus with other neurologic complication, without long-term current use of insulin  Hypertension, unspecified type      PSHx: non-contributory      Allergies:  No Known Allergies      Home Meds: Metformin, captopril       FAMILY HISTORY: CAD in mother and father      Social History:  +2ppd smoking hx, no alcohol, or IVDU    REVIEW OF SYSTEMS:  CONSTITUTIONAL: No weakness, fevers or chills  EYES/ENT: No visual changes;  No dysphagia  NECK: No pain or stiffness  RESPIRATORY: No cough, wheezing, hemoptysis; No shortness of breath  CARDIOVASCULAR: +chest pain  GASTROINTESTINAL: No abdominal or epigastric pain. No nausea, vomiting, or hematemesis; No diarrhea or constipation. No melena or hematochezia.  BACK: No back pain  GENITOURINARY: No dysuria, frequency or hematuria  NEUROLOGICAL: No numbness or weakness  SKIN: No itching, burning, rashes, or lesions   All other review of systems is negative unless indicated above.      Physical Exam:  T(F): 98.3 (09-12), Max: 98.3 (09-12)  HR: 55 (09-12) (55 - 78)  BP: 160/78 (09-12) (160/78 - 196/83)  RR: 17 (09-12)  SpO2: 97% (09-12)    GENERAL: No acute distress  HEAD:  Atraumatic, Normocephalic  ENT: EOMI, PERRLA, conjunctiva and sclera clear, Neck supple, No JVD, moist mucosa  CHEST/LUNG: Clear to auscultation bilaterally; No wheeze, equal breath sounds bilaterally   BACK: No spinal tenderness  HEART: Regular rate and rhythm; No murmurs, rubs, or gallops  ABDOMEN: Soft, Nontender, Nondistended; Bowel sounds present  EXTREMITIES:  No clubbing, cyanosis, or edema  PSYCH: Nl behavior, nl affect  NEUROLOGY: AAOx3, non-focal, cranial nerves intact  SKIN: Normal color, No rashes or lesions    Labs Personally Reviewed 9/15/2019      12 Lead ECG (09.12.19 @ 08:55)   NSR at 82 BPM   P-R Interval 148 ms, QRS Duration 90 ms, Q-T Interval 382 ms   Axis -29 degrees   MINIMAL VOLTAGE CRITERIA FOR LVH, MAY BE NORMAL VARIANT, NONSPECIFIC ST AND T WAVE ABNORMALITY      CT Heart with Coronaries (09.12.19 @ 12:09) >  CORONARY ARTERY ANGIOGRAM FINDINGS:    Left main: The left main is a normal caliber vessel which gives rise to the LAD and circumflex arteries. Mixed plaques within left main with moderate (approximately 60-70%) stenosis.    LAD: The LAD is a normal caliber vessel. Mixed plaques within the proximal and mid LAD with severe (approximately 80-90%) stenosis. Noncalcified plaques through the distal LAD with mild (approximately 30-40%) stenosis.  Mixed plaques within the proximal D1 and through the course of the D2 branch. Evaluation of the degree of stenosis is difficult secondary to small caliber of the vessels but grossly patent.    LCX: The circumflex artery is a small caliber vessel. Mixed plaques within proximal and distal LCx with severe (approximately 80-90%) stenosis.    RCA: The right coronary artery is dominant giving rise to the posterior descending artery and a posterolateral branch. Mixed plaques within the proximal RCA with severe (100%) stenosis. There is reconstitution of the mid and distal RCA. Noncalcified plaques within the mid RCA with moderate  (approximately 70%) stenosis. Mixed plaques within the distal RCA with  mild (approximately 30-40%) stenosis.    DOMINANCE: Right coronary artery.        Xray Chest 2 Views PA/Lat (09.12.19 @ 09:45) >  INTERPRETATION:  HISTORY: Chest pain  COMPARISON: None.  FINDINGS:  The cardiac silhouette is normal in size. There are no focal consolidations or pleural effusions. The hilar and mediastinal structures appear unremarkable. The osseous structures are intact.    IMPRESSION: Clear lungs.    ORION GOMEZ M.D., ATTENDING RADIOLOGIST  This document has been electronically signed. Sep 12 2019 10:07AM      LABS:   Labs Personally Reviewed 9/15/2019               15.5   9.0   )-----------( 111      ( 12 Sep 2019 09:34 )             45.5     09-12  140  |  99  |  11  ----------------------------<  264<H>  4.0   |  25  |  0.94    Ca    9.6      12 Sep 2019 09:34    TPro  7.1  /  Alb  4.0  /  TBili  0.7  /  DBili  x   /  AST  13  /  ALT  15  /  AlkPhos  85  09-12    PT/INR - ( 12 Sep 2019 09:34 )   PT: 12.2 sec;   INR: 1.06 ratio    PTT - ( 12 Sep 2019 09:34 )  PTT:24.7 sec    CARDIAC MARKERS ( 12 Sep 2019 09:34 ) 19 ng/L / x     / x     / x     / x     / x        Assessment and Recommendation:   · Assessment		  64 M w/ HTN and T2DM.  P/W unstable angina.  CTA of  coronaries c/w  TVD with L main involvement.      REC:  CAD  - Severe 3vDisease  - Continue ASA  - Continue Lipitor  - Captopril  - Change hydralazine to 100Q8  - Continue Metoprolol 25 BID  - Continue Nicotine Patch

## 2019-09-15 NOTE — PROGRESS NOTE ADULT - PROBLEM SELECTOR PLAN 2
Patient on unknown dose of captopril at home. Ran out of medications on Tuesday, BPs have been 170s-200 at home.  after cath. BPs persistently elevated. Has not seen a doctor in 12 years.  - Captopril held by CT surgery as to avoid ACEI/ARB prior to surgery. Holding.  - Increased Hydralazine to 25 mg TID  - Increased Metoprolol to 25 mg BID Patient on unknown dose of captopril at home. Ran out of medications on Tuesday, BPs have been 170s-200 at home.  after cath. BPs persistently elevated. Has not seen a doctor in 12 years.  - Captopril held by CT surgery as to avoid ACEI/ARB prior to surgery  - Increased Hydralazine to 50 mg TID, Metoprolol to 25 mg BID

## 2019-09-16 DIAGNOSIS — D72.829 ELEVATED WHITE BLOOD CELL COUNT, UNSPECIFIED: ICD-10-CM

## 2019-09-16 LAB
ANION GAP SERPL CALC-SCNC: 13 MMOL/L — SIGNIFICANT CHANGE UP (ref 5–17)
APPEARANCE UR: CLEAR — SIGNIFICANT CHANGE UP
BACTERIA # UR AUTO: NEGATIVE — SIGNIFICANT CHANGE UP
BASOPHILS # BLD AUTO: 0.1 K/UL — SIGNIFICANT CHANGE UP (ref 0–0.2)
BASOPHILS NFR BLD AUTO: 0.7 % — SIGNIFICANT CHANGE UP (ref 0–2)
BILIRUB UR-MCNC: NEGATIVE — SIGNIFICANT CHANGE UP
BLD GP AB SCN SERPL QL: NEGATIVE — SIGNIFICANT CHANGE UP
BUN SERPL-MCNC: 17 MG/DL — SIGNIFICANT CHANGE UP (ref 7–23)
CALCIUM SERPL-MCNC: 9.5 MG/DL — SIGNIFICANT CHANGE UP (ref 8.4–10.5)
CHLORIDE SERPL-SCNC: 103 MMOL/L — SIGNIFICANT CHANGE UP (ref 96–108)
CO2 SERPL-SCNC: 24 MMOL/L — SIGNIFICANT CHANGE UP (ref 22–31)
COLOR SPEC: YELLOW — SIGNIFICANT CHANGE UP
CREAT SERPL-MCNC: 0.9 MG/DL — SIGNIFICANT CHANGE UP (ref 0.5–1.3)
DIFF PNL FLD: NEGATIVE — SIGNIFICANT CHANGE UP
EOSINOPHIL # BLD AUTO: 0.5 K/UL — SIGNIFICANT CHANGE UP (ref 0–0.5)
EOSINOPHIL NFR BLD AUTO: 4.3 % — SIGNIFICANT CHANGE UP (ref 0–6)
EPI CELLS # UR: 2 /HPF — SIGNIFICANT CHANGE UP
GLUCOSE BLDC GLUCOMTR-MCNC: 162 MG/DL — HIGH (ref 70–99)
GLUCOSE BLDC GLUCOMTR-MCNC: 190 MG/DL — HIGH (ref 70–99)
GLUCOSE SERPL-MCNC: 190 MG/DL — HIGH (ref 70–99)
GLUCOSE UR QL: ABNORMAL
HCT VFR BLD CALC: 46.6 % — SIGNIFICANT CHANGE UP (ref 39–50)
HGB BLD-MCNC: 15.1 G/DL — SIGNIFICANT CHANGE UP (ref 13–17)
HYALINE CASTS # UR AUTO: 1 /LPF — SIGNIFICANT CHANGE UP (ref 0–2)
INR BLD: 1.04 RATIO — SIGNIFICANT CHANGE UP (ref 0.88–1.16)
KETONES UR-MCNC: NEGATIVE — SIGNIFICANT CHANGE UP
LEUKOCYTE ESTERASE UR-ACNC: NEGATIVE — SIGNIFICANT CHANGE UP
LYMPHOCYTES # BLD AUTO: 2.5 K/UL — SIGNIFICANT CHANGE UP (ref 1–3.3)
LYMPHOCYTES # BLD AUTO: 22.2 % — SIGNIFICANT CHANGE UP (ref 13–44)
MAGNESIUM SERPL-MCNC: 1.9 MG/DL — SIGNIFICANT CHANGE UP (ref 1.6–2.6)
MCHC RBC-ENTMCNC: 29.7 PG — SIGNIFICANT CHANGE UP (ref 27–34)
MCHC RBC-ENTMCNC: 32.5 GM/DL — SIGNIFICANT CHANGE UP (ref 32–36)
MCV RBC AUTO: 91.5 FL — SIGNIFICANT CHANGE UP (ref 80–100)
MONOCYTES # BLD AUTO: 1 K/UL — HIGH (ref 0–0.9)
MONOCYTES NFR BLD AUTO: 8.7 % — SIGNIFICANT CHANGE UP (ref 2–14)
NEUTROPHILS # BLD AUTO: 7.1 K/UL — SIGNIFICANT CHANGE UP (ref 1.8–7.4)
NEUTROPHILS NFR BLD AUTO: 64.1 % — SIGNIFICANT CHANGE UP (ref 43–77)
NITRITE UR-MCNC: NEGATIVE — SIGNIFICANT CHANGE UP
PA ADP PRP-ACNC: 173 PRU — LOW (ref 194–417)
PH UR: 6 — SIGNIFICANT CHANGE UP (ref 5–8)
PHOSPHATE SERPL-MCNC: 3.7 MG/DL — SIGNIFICANT CHANGE UP (ref 2.5–4.5)
PLATELET # BLD AUTO: 130 K/UL — LOW (ref 150–400)
POTASSIUM SERPL-MCNC: 4.1 MMOL/L — SIGNIFICANT CHANGE UP (ref 3.5–5.3)
POTASSIUM SERPL-SCNC: 4.1 MMOL/L — SIGNIFICANT CHANGE UP (ref 3.5–5.3)
PROT UR-MCNC: ABNORMAL
PROTHROM AB SERPL-ACNC: 11.9 SEC — SIGNIFICANT CHANGE UP (ref 10–12.9)
RBC # BLD: 5.09 M/UL — SIGNIFICANT CHANGE UP (ref 4.2–5.8)
RBC # FLD: 12.7 % — SIGNIFICANT CHANGE UP (ref 10.3–14.5)
RBC CASTS # UR COMP ASSIST: 2 /HPF — SIGNIFICANT CHANGE UP (ref 0–4)
RH IG SCN BLD-IMP: POSITIVE — SIGNIFICANT CHANGE UP
SODIUM SERPL-SCNC: 140 MMOL/L — SIGNIFICANT CHANGE UP (ref 135–145)
SP GR SPEC: 1.02 — SIGNIFICANT CHANGE UP (ref 1.01–1.02)
UROBILINOGEN FLD QL: NEGATIVE — SIGNIFICANT CHANGE UP
WBC # BLD: 11.1 K/UL — HIGH (ref 3.8–10.5)
WBC # FLD AUTO: 11.1 K/UL — HIGH (ref 3.8–10.5)
WBC UR QL: 0 /HPF — SIGNIFICANT CHANGE UP (ref 0–5)

## 2019-09-16 PROCEDURE — 71045 X-RAY EXAM CHEST 1 VIEW: CPT | Mod: 26

## 2019-09-16 PROCEDURE — 70498 CT ANGIOGRAPHY NECK: CPT | Mod: 26

## 2019-09-16 PROCEDURE — 99233 SBSQ HOSP IP/OBS HIGH 50: CPT

## 2019-09-16 PROCEDURE — 70496 CT ANGIOGRAPHY HEAD: CPT | Mod: 26

## 2019-09-16 PROCEDURE — 99233 SBSQ HOSP IP/OBS HIGH 50: CPT | Mod: GC

## 2019-09-16 RX ORDER — HYDRALAZINE HCL 50 MG
75 TABLET ORAL EVERY 8 HOURS
Refills: 0 | Status: DISCONTINUED | OUTPATIENT
Start: 2019-09-16 | End: 2019-09-17

## 2019-09-16 RX ORDER — HEPARIN SODIUM 5000 [USP'U]/ML
5000 INJECTION INTRAVENOUS; SUBCUTANEOUS EVERY 8 HOURS
Refills: 0 | Status: COMPLETED | OUTPATIENT
Start: 2019-09-16 | End: 2019-09-18

## 2019-09-16 RX ADMIN — HEPARIN SODIUM 5000 UNIT(S): 5000 INJECTION INTRAVENOUS; SUBCUTANEOUS at 21:02

## 2019-09-16 RX ADMIN — Medication 1 PATCH: at 12:18

## 2019-09-16 RX ADMIN — ATORVASTATIN CALCIUM 80 MILLIGRAM(S): 80 TABLET, FILM COATED ORAL at 21:03

## 2019-09-16 RX ADMIN — Medication 50 MILLIGRAM(S): at 12:18

## 2019-09-16 RX ADMIN — Medication 1 PATCH: at 11:37

## 2019-09-16 RX ADMIN — Medication 1: at 13:00

## 2019-09-16 RX ADMIN — Medication 1 PATCH: at 19:37

## 2019-09-16 RX ADMIN — Medication 1 PATCH: at 07:30

## 2019-09-16 RX ADMIN — Medication 1: at 17:42

## 2019-09-16 RX ADMIN — Medication 50 MILLIGRAM(S): at 05:04

## 2019-09-16 RX ADMIN — Medication 25 MILLIGRAM(S): at 16:22

## 2019-09-16 RX ADMIN — Medication 1: at 08:56

## 2019-09-16 RX ADMIN — Medication 75 MILLIGRAM(S): at 21:03

## 2019-09-16 RX ADMIN — Medication 25 MILLIGRAM(S): at 05:04

## 2019-09-16 RX ADMIN — Medication 81 MILLIGRAM(S): at 08:56

## 2019-09-16 NOTE — PROGRESS NOTE ADULT - ATTENDING COMMENTS
-Patient seen/examined on 9/16/19. Case/plan discussed with the intern and resident as reviewed/edited by me above and in any comments below. -Patient seen/examined on 9/16/19. Case/plan discussed with the intern and resident as reviewed/edited by me above and in any comments below.  -Updated patient and his wife at bedside.  -Counseled patient on smoking cessation.

## 2019-09-16 NOTE — PROGRESS NOTE ADULT - PROBLEM SELECTOR PLAN 1
Worsening non-exertional chest pain x 6 days without troponin elevation or change. Coronary CT showed significant calcification in left main, LAD, and RCA. Coronary catheterization 9/12 shows triple vessel disease. Echo 60-65% (9/19).  - Cardiology consulted, recommended BP optimization  - ASA 81 mg, Atorvastatin 80 mg  - CT surg following, CABG postponed due to P2Y12 level too low  - CABG planned for Tues 9/17, f/u P2Y12 activity in AM (ordered)

## 2019-09-16 NOTE — PROGRESS NOTE ADULT - SUBJECTIVE AND OBJECTIVE BOX
INTERVAL EVENTS: TROY    SUBJECTIVE:  Complained of 3/10 dull CP while getting up to urinate around 3am that lasted 5 minutes.    ROS:  General - No fevers or chills  Cards - No chest pain or palpitations  Pulm - No cough or shortness of breath  GI - No abdominal pain, diarrhea, melena, or hematochezia. Last BM was yesterday   - No dysuria or hematuria    OBJECTIVE:  Vitals Last 24 hrs  Vital Signs Last 24 Hrs  T(C): 36.8 (16 Sep 2019 04:26), Max: 36.8 (16 Sep 2019 04:26)  T(F): 98.2 (16 Sep 2019 04:26), Max: 98.2 (16 Sep 2019 04:26)  HR: 69 (16 Sep 2019 04:26) (69 - 80)  BP: 154/79 (16 Sep 2019 04:26) (154/79 - 166/83)  BP(mean): --  RR: 18 (16 Sep 2019 04:26) (18 - 18)  SpO2: 96% (16 Sep 2019 04:26) (96% - 98%)    IOs  I&O's Summary    15 Sep 2019 07:01  -  16 Sep 2019 07:00  --------------------------------------------------------  IN: 1720 mL / OUT: 0 mL / NET: 1720 mL    16 Sep 2019 07:01  -  16 Sep 2019 09:13  --------------------------------------------------------  IN: 180 mL / OUT: 0 mL / NET: 180 mL        PE:  General - Lying in bed NAD.  Heart - RRR. Normal S1 and S2. No murmurs auscultated.  Lungs - Clear to auscultation bilaterally. No chest wall tenderness.  GI - Abdomen soft, NT, ND.  Extremities - No lower extremity edema. Peripheral pulses intact. Calves nontender.  Skin - No rashes on extremities    LABS:  09-16    140  |  103  |  17  ----------------------------<  190<H>  4.1   |  24  |  0.90    Ca    9.5      16 Sep 2019 07:08  Phos  3.7     09-16  Mg     1.9     09-16                              15.1   11.1  )-----------( 130      ( 16 Sep 2019 07:08 )             46.6           IMAGING: No new imaging INTERVAL EVENTS: TROY    SUBJECTIVE:  Complained of 3/10 dull CP while getting up to urinate around 3am that lasted 5 minutes.    ROS:  General - No fevers or chills  Cards - No chest pain or palpitations  Pulm - No cough or shortness of breath  GI - No abdominal pain, diarrhea, melena, or hematochezia. Last BM was yesterday   - No dysuria or hematuria    OBJECTIVE:  Vitals Last 24 hrs  Vital Signs Last 24 Hrs  T(C): 36.8 (16 Sep 2019 04:26), Max: 36.8 (16 Sep 2019 04:26)  T(F): 98.2 (16 Sep 2019 04:26), Max: 98.2 (16 Sep 2019 04:26)  HR: 69 (16 Sep 2019 04:26) (69 - 80)  BP: 154/79 (16 Sep 2019 04:26) (154/79 - 166/83)  BP(mean): --  RR: 18 (16 Sep 2019 04:26) (18 - 18)  SpO2: 96% (16 Sep 2019 04:26) (96% - 98%)    IOs  I&O's Summary    15 Sep 2019 07:01  -  16 Sep 2019 07:00  --------------------------------------------------------  IN: 1720 mL / OUT: 0 mL / NET: 1720 mL    16 Sep 2019 07:01  -  16 Sep 2019 09:13  --------------------------------------------------------  IN: 180 mL / OUT: 0 mL / NET: 180 mL        PE:  General - Lying in bed NAD.  Heart - RRR. Normal S1 and S2. No murmurs auscultated.  Lungs - Clear to auscultation bilaterally. No chest wall tenderness.  GI - Abdomen soft, NT, ND.  Extremities - No lower extremity edema. Peripheral pulses intact. Calves nontender.  Skin - No rashes on extremities    LABS:  09-16    140  |  103  |  17  ----------------------------<  190<H>  4.1   |  24  |  0.90    Ca    9.5      16 Sep 2019 07:08  Phos  3.7     09-16  Mg     1.9     09-16                              15.1   11.1  )-----------( 130      ( 16 Sep 2019 07:08 )             46.6       Telemetry reviewed: Sinus 60-80's.     IMAGING: No new imaging

## 2019-09-16 NOTE — PROGRESS NOTE ADULT - PROBLEM SELECTOR PLAN 6
-Very mild (11.1), but uptrending  -No neutrophilic predominance  -No URI symptoms, UTI, abd pain. -Very mild (11.1), but uptrending  -No neutrophilic predominance  -No URI symptoms, UTI, abd pain.  -Get CXR and UA.

## 2019-09-16 NOTE — PROGRESS NOTE ADULT - PROBLEM SELECTOR PLAN 3
On Metformin at home. Does not know dose. States he checks BGs daily, and they are usually around 120.  HbA1c 9.2% BGs have been in mid 100s. Notes recent unintentional weight loss in the past several months  - ISS, start insulin as needed  - Should be discharged on insulin On Metformin at home. Does not know dose. States he checks BGs daily, and they are usually around 120.  HbA1c 9.2% BGs have been in mid 100s. Notes recent unintentional weight loss in the past several months  - ISS, start insulin as needed  - Should possibly be discharged on insulin

## 2019-09-16 NOTE — PROGRESS NOTE ADULT - PROBLEM SELECTOR PLAN 2
Patient on unknown dose of captopril at home. Ran out of medications on Tuesday, BPs have been 170s-200 at home.  after cath. BPs persistently elevated. Has not seen a doctor in 12 years.  - Captopril held by CT surgery as to avoid ACEI/ARB prior to surgery  - Increased Hydralazine to 50 mg TID, Metoprolol to 25 mg BID Patient on unknown dose of captopril at home. Ran out of medications on Tuesday, BPs have been 170s-200 at home.  after cath. BPs persistently elevated. Has not seen a doctor in 12 years.  - Captopril held by CT surgery as to avoid ACEI/ARB prior to surgery  - Hydralazine to 75 mg TID, Metoprolol to 25 mg BID Patient on unknown dose of captopril at home. Ran out of medications on Tuesday, BPs have been 170s-200 at home.  after cath. BPs were persistently elevated. Has not seen a doctor in 12 years.  - Captopril held by CT surgery as to avoid ACEI/ARB prior to surgery  - Hydralazine to 75 mg TID, Metoprolol to 25 mg BID

## 2019-09-16 NOTE — PROGRESS NOTE ADULT - SUBJECTIVE AND OBJECTIVE BOX
Consult Follow Up Note  Referring: Luis Fernando  Reason: CP/Significant 3vCAD  HPI:  64 M w/ HTN and T2DM, p/w L substernal chest pressure on and off since returning from a trip to [facility] last week.  Presented to the hospital last night as the pain was not abating. Denies any chest pain at present.  Pain has been occurring intermittently and is non-exertional and non-radiating. Pt w/ extensive family hx of CAD and currently smokes 2ppd.    Now with significant 3vCAD awaiting CABG    ===========================  Interval Events  - Found to have 3vCAD  - Remains hypertensive, but improved  - No reported worsening CP/Palps/SOB\  ===========================          PMHx:   Type 2 diabetes mellitus with other neurologic complication, without long-term current use of insulin  Hypertension, unspecified type      PSHx: non-contributory      Allergies:  No Known Allergies      Home Meds: Metformin, captopril       FAMILY HISTORY: CAD in mother and father      Social History:  +2ppd smoking hx, no alcohol, or IVDU    REVIEW OF SYSTEMS:  CONSTITUTIONAL: No weakness, fevers or chills  EYES/ENT: No visual changes;  No dysphagia  NECK: No pain or stiffness  RESPIRATORY: No cough, wheezing, hemoptysis; No shortness of breath  CARDIOVASCULAR: +chest pain  GASTROINTESTINAL: No abdominal or epigastric pain. No nausea, vomiting, or hematemesis; No diarrhea or constipation. No melena or hematochezia.  BACK: No back pain  GENITOURINARY: No dysuria, frequency or hematuria  NEUROLOGICAL: No numbness or weakness  SKIN: No itching, burning, rashes, or lesions   All other review of systems is negative unless indicated above.      Physical Exam:  T(F): 98.3 (09-12), Max: 98.3 (09-12)  HR: 55 (09-12) (55 - 78)  BP: 160/78 (09-12) (160/78 - 196/83)  RR: 17 (09-12)  SpO2: 97% (09-12)    GENERAL: No acute distress  HEAD:  Atraumatic, Normocephalic  ENT: EOMI, PERRLA, conjunctiva and sclera clear, Neck supple, No JVD, moist mucosa  CHEST/LUNG: Clear to auscultation bilaterally; No wheeze, equal breath sounds bilaterally   BACK: No spinal tenderness  HEART: Regular rate and rhythm; No murmurs, rubs, or gallops  ABDOMEN: Soft, Nontender, Nondistended; Bowel sounds present  EXTREMITIES:  No clubbing, cyanosis, or edema  PSYCH: Nl behavior, nl affect  NEUROLOGY: AAOx3, non-focal, cranial nerves intact  SKIN: Normal color, No rashes or lesions    Labs Personally Reviewed 9/16/2019      12 Lead ECG (09.12.19 @ 08:55)   NSR at 82 BPM   P-R Interval 148 ms, QRS Duration 90 ms, Q-T Interval 382 ms   Axis -29 degrees   MINIMAL VOLTAGE CRITERIA FOR LVH, MAY BE NORMAL VARIANT, NONSPECIFIC ST AND T WAVE ABNORMALITY      CT Heart with Coronaries (09.12.19 @ 12:09) >  CORONARY ARTERY ANGIOGRAM FINDINGS:    Left main: The left main is a normal caliber vessel which gives rise to the LAD and circumflex arteries. Mixed plaques within left main with moderate (approximately 60-70%) stenosis.    LAD: The LAD is a normal caliber vessel. Mixed plaques within the proximal and mid LAD with severe (approximately 80-90%) stenosis. Noncalcified plaques through the distal LAD with mild (approximately 30-40%) stenosis.  Mixed plaques within the proximal D1 and through the course of the D2 branch. Evaluation of the degree of stenosis is difficult secondary to small caliber of the vessels but grossly patent.    LCX: The circumflex artery is a small caliber vessel. Mixed plaques within proximal and distal LCx with severe (approximately 80-90%) stenosis.    RCA: The right coronary artery is dominant giving rise to the posterior descending artery and a posterolateral branch. Mixed plaques within the proximal RCA with severe (100%) stenosis. There is reconstitution of the mid and distal RCA. Noncalcified plaques within the mid RCA with moderate  (approximately 70%) stenosis. Mixed plaques within the distal RCA with  mild (approximately 30-40%) stenosis.    DOMINANCE: Right coronary artery.        Xray Chest 2 Views PA/Lat (09.12.19 @ 09:45) >  INTERPRETATION:  HISTORY: Chest pain  COMPARISON: None.  FINDINGS:  The cardiac silhouette is normal in size. There are no focal consolidations or pleural effusions. The hilar and mediastinal structures appear unremarkable. The osseous structures are intact.    IMPRESSION: Clear lungs.    ORION GOMEZ M.D., ATTENDING RADIOLOGIST  This document has been electronically signed. Sep 12 2019 10:07AM      LABS:   Labs Personally Reviewed 9/16/2019               15.5   9.0   )-----------( 111      ( 12 Sep 2019 09:34 )             45.5     09-12  140  |  99  |  11  ----------------------------<  264<H>  4.0   |  25  |  0.94    Ca    9.6      12 Sep 2019 09:34    TPro  7.1  /  Alb  4.0  /  TBili  0.7  /  DBili  x   /  AST  13  /  ALT  15  /  AlkPhos  85  09-12    PT/INR - ( 12 Sep 2019 09:34 )   PT: 12.2 sec;   INR: 1.06 ratio    PTT - ( 12 Sep 2019 09:34 )  PTT:24.7 sec    CARDIAC MARKERS ( 12 Sep 2019 09:34 ) 19 ng/L / x     / x     / x     / x     / x        Assessment and Recommendation:   · Assessment		  64 M w/ HTN and T2DM.  P/W unstable angina.  CTA of  coronaries c/w  TVD with L main involvement.      REC:  CAD  - Severe 3vDisease  - Continue ASA  - Continue Lipitor  - Captopril  - Change hydralazine to 100Q8  - Continue Metoprolol 25 BID  - Continue Nicotine Patch

## 2019-09-16 NOTE — PROGRESS NOTE ADULT - ASSESSMENT
64 year-old [place] male with history of hypertension, T2DM, and current smoker admitted with unstable angina found on coronary catheterization to have triple vessel disease, pending CABG.

## 2019-09-16 NOTE — PROGRESS NOTE ADULT - PROBLEM SELECTOR PLAN 5
DVT: Hold anticoagulation for 24 hours post cath  Diet: Carb consistent/DASH DVT: Subcutaneous heparin  Diet: Carb consistent/DASH DVT PPx: Subcutaneous heparin  Diet: Carb consistent/DASH

## 2019-09-17 LAB
ALBUMIN SERPL ELPH-MCNC: 3.8 G/DL — SIGNIFICANT CHANGE UP (ref 3.3–5)
ALP SERPL-CCNC: 96 U/L — SIGNIFICANT CHANGE UP (ref 40–120)
ALT FLD-CCNC: 39 U/L — SIGNIFICANT CHANGE UP (ref 10–45)
ANION GAP SERPL CALC-SCNC: 13 MMOL/L — SIGNIFICANT CHANGE UP (ref 5–17)
AST SERPL-CCNC: 26 U/L — SIGNIFICANT CHANGE UP (ref 10–40)
BILIRUB SERPL-MCNC: 0.7 MG/DL — SIGNIFICANT CHANGE UP (ref 0.2–1.2)
BUN SERPL-MCNC: 20 MG/DL — SIGNIFICANT CHANGE UP (ref 7–23)
CALCIUM SERPL-MCNC: 9.7 MG/DL — SIGNIFICANT CHANGE UP (ref 8.4–10.5)
CHLORIDE SERPL-SCNC: 99 MMOL/L — SIGNIFICANT CHANGE UP (ref 96–108)
CO2 SERPL-SCNC: 24 MMOL/L — SIGNIFICANT CHANGE UP (ref 22–31)
CREAT SERPL-MCNC: 1.03 MG/DL — SIGNIFICANT CHANGE UP (ref 0.5–1.3)
GLUCOSE SERPL-MCNC: 161 MG/DL — HIGH (ref 70–99)
HCT VFR BLD CALC: 46.2 % — SIGNIFICANT CHANGE UP (ref 39–50)
HGB BLD-MCNC: 14.7 G/DL — SIGNIFICANT CHANGE UP (ref 13–17)
MAGNESIUM SERPL-MCNC: 1.7 MG/DL — SIGNIFICANT CHANGE UP (ref 1.6–2.6)
MCHC RBC-ENTMCNC: 29.1 PG — SIGNIFICANT CHANGE UP (ref 27–34)
MCHC RBC-ENTMCNC: 31.9 GM/DL — LOW (ref 32–36)
MCV RBC AUTO: 91.4 FL — SIGNIFICANT CHANGE UP (ref 80–100)
PA ADP PRP-ACNC: 155 PRU — LOW (ref 194–417)
PHOSPHATE SERPL-MCNC: 3.5 MG/DL — SIGNIFICANT CHANGE UP (ref 2.5–4.5)
PLATELET # BLD AUTO: 131 K/UL — LOW (ref 150–400)
POTASSIUM SERPL-MCNC: 4 MMOL/L — SIGNIFICANT CHANGE UP (ref 3.5–5.3)
POTASSIUM SERPL-SCNC: 4 MMOL/L — SIGNIFICANT CHANGE UP (ref 3.5–5.3)
PROT SERPL-MCNC: 6.7 G/DL — SIGNIFICANT CHANGE UP (ref 6–8.3)
RBC # BLD: 5.06 M/UL — SIGNIFICANT CHANGE UP (ref 4.2–5.8)
RBC # FLD: 12.8 % — SIGNIFICANT CHANGE UP (ref 10.3–14.5)
SODIUM SERPL-SCNC: 136 MMOL/L — SIGNIFICANT CHANGE UP (ref 135–145)
WBC # BLD: 11.7 K/UL — HIGH (ref 3.8–10.5)
WBC # FLD AUTO: 11.7 K/UL — HIGH (ref 3.8–10.5)

## 2019-09-17 PROCEDURE — 99232 SBSQ HOSP IP/OBS MODERATE 35: CPT | Mod: GC

## 2019-09-17 PROCEDURE — 99233 SBSQ HOSP IP/OBS HIGH 50: CPT

## 2019-09-17 RX ORDER — HYDRALAZINE HCL 50 MG
100 TABLET ORAL EVERY 8 HOURS
Refills: 0 | Status: DISCONTINUED | OUTPATIENT
Start: 2019-09-17 | End: 2019-09-19

## 2019-09-17 RX ADMIN — Medication 81 MILLIGRAM(S): at 13:30

## 2019-09-17 RX ADMIN — Medication 1 PATCH: at 12:30

## 2019-09-17 RX ADMIN — Medication 1: at 08:58

## 2019-09-17 RX ADMIN — HEPARIN SODIUM 5000 UNIT(S): 5000 INJECTION INTRAVENOUS; SUBCUTANEOUS at 05:18

## 2019-09-17 RX ADMIN — HEPARIN SODIUM 5000 UNIT(S): 5000 INJECTION INTRAVENOUS; SUBCUTANEOUS at 13:30

## 2019-09-17 RX ADMIN — Medication 75 MILLIGRAM(S): at 05:18

## 2019-09-17 RX ADMIN — Medication 1 PATCH: at 19:45

## 2019-09-17 RX ADMIN — Medication 100 MILLIGRAM(S): at 21:08

## 2019-09-17 RX ADMIN — Medication 25 MILLIGRAM(S): at 18:04

## 2019-09-17 RX ADMIN — Medication 1: at 13:29

## 2019-09-17 RX ADMIN — Medication 25 MILLIGRAM(S): at 05:18

## 2019-09-17 RX ADMIN — Medication 100 MILLIGRAM(S): at 13:31

## 2019-09-17 RX ADMIN — Medication 1: at 18:03

## 2019-09-17 RX ADMIN — HEPARIN SODIUM 5000 UNIT(S): 5000 INJECTION INTRAVENOUS; SUBCUTANEOUS at 21:07

## 2019-09-17 RX ADMIN — ATORVASTATIN CALCIUM 80 MILLIGRAM(S): 80 TABLET, FILM COATED ORAL at 21:08

## 2019-09-17 RX ADMIN — Medication 1 PATCH: at 13:30

## 2019-09-17 NOTE — PROGRESS NOTE ADULT - ATTENDING COMMENTS
-Patient seen/examined on 9/17/19. Case/plan discussed with the intern and resident as reviewed/edited by me above and in any comments below.  -Updated patient and his wife at bedside.

## 2019-09-17 NOTE — PROGRESS NOTE ADULT - SUBJECTIVE AND OBJECTIVE BOX
INTERVAL EVENTS: TROY    SUBJECTIVE: Chronic cough without sputum. No chest pain.    ROS:  General - No fevers or chills  Cards - No chest pain or palpitations  Pulm - No cough or shortness of breath  GI - No abdominal pain, diarrhea, melena, or hematochezia. Last BM was yesterday   - No dysuria or hematuria    OBJECTIVE:  Vitals Last 24 hrs  Vital Signs Last 24 Hrs  T(C): 36.7 (17 Sep 2019 04:21), Max: 36.8 (16 Sep 2019 11:49)  T(F): 98 (17 Sep 2019 04:21), Max: 98.2 (16 Sep 2019 11:49)  HR: 74 (17 Sep 2019 04:21) (69 - 74)  BP: 156/81 (17 Sep 2019 04:21) (154/82 - 160/80)  BP(mean): --  RR: 18 (17 Sep 2019 04:21) (18 - 18)  SpO2: 98% (17 Sep 2019 04:21) (98% - 99%)    IOs  I&O's Summary    16 Sep 2019 07:01  -  17 Sep 2019 07:00  --------------------------------------------------------  IN: 1300 mL / OUT: 0 mL / NET: 1300 mL        PE:  General - Appears well. NAD. Sitting up in bed.  Heart - Normal S1 and S2. No murmurs auscultated.  Lungs - Clear to auscultation bilaterally  GI - Abdomen soft, NT, ND.  Extremities - No lower extremity edema. Peripheral pulses intact. Calves nontender.  Skin - No rashes on extremities    LABS:      136  |  99  |  20  ----------------------------<  161<H>  4.0   |  24  |  1.03    Ca    9.7      17 Sep 2019 06:14  Phos  3.5       Mg     1.7         TPro  6.7  /  Alb  3.8  /  TBili  0.7  /  DBili  x   /  AST  26  /  ALT  39  /  AlkPhos  96                              14.7   11.7  )-----------( 131      ( 17 Sep 2019 06:14 )             46.2       Urinalysis Basic - ( 16 Sep 2019 15:35 )    Color: Yellow / Appearance: Clear / S.021 / pH: x  Gluc: x / Ketone: Negative  / Bili: Negative / Urobili: Negative   Blood: x / Protein: 30 mg/dL / Nitrite: Negative   Leuk Esterase: Negative / RBC: 2 /hpf / WBC 0 /HPF   Sq Epi: x / Non Sq Epi: 2 /hpf / Bacteria: Negative        IMAGING:   < from: CT Angio Head w/ IV Cont (19 @ 18:30) >  CT brain:     No acute intracranial hemorrhage,mass effect, vasogenic edema, or   evidence of acute territorial infarct.    CTA brain:    No vascular aneurysm or flow-limiting stenosis.    CTA neck:    Approximately 55% short segment stenosis (by NASCET criteria) of the   proximal left ICA beginning just distal to its origin due to partially   calcified plaque.    Approximately 55% long segment stenosis (by NASCET criteria) of the mid   left common carotid artery due to predominantly noncalcified plaque.    < end of copied text >    < from: Xray Chest 1 View- PORTABLE-Urgent (19 @ 13:49) >  IMPRESSION:  Clear lungs.     < end of copied text >    Tele: Sinus 60-80

## 2019-09-17 NOTE — PROGRESS NOTE ADULT - PROBLEM SELECTOR PLAN 1
Worsening non-exertional chest pain x 6 days without troponin elevation or change. Triple vessel disease (cath 9/12)  - Cards following  - ASA 81 mg, Atorvastatin 80 mg  - CT surg following, CABG postponed due to P2Y12 level too low  - CABG tentatively planned for Thurs. Worsening non-exertional chest pain x 6 days without troponin elevation or change. Triple vessel disease (cath 9/12)  - Cards following  - ASA 81 mg, Atorvastatin 80 mg  - CT surg following, CABG postponed due to P2Y12 level too low  - CABG tentatively planned for Thurs

## 2019-09-17 NOTE — PROGRESS NOTE ADULT - PROBLEM SELECTOR PLAN 3
HbA1c 9.2%. BGs mid 100s  - ISS, but low requirements  - Checking fructosamine given low requirements and very high A1c  - D/c on insulin given HbA1c

## 2019-09-17 NOTE — PROGRESS NOTE ADULT - ASSESSMENT
64 year-old [place] male with history of hypertension, T2DM, and current smoker admitted with unstable angina found on coronary catheterization to have triple vessel disease, pending CABG.

## 2019-09-17 NOTE — PROGRESS NOTE ADULT - SUBJECTIVE AND OBJECTIVE BOX
Consult Follow Up Note  Referring: Luis Fernando  Reason: CP/Significant 3vCAD  HPI:  64 M w/ HTN and T2DM, p/w L substernal chest pressure on and off since returning from a trip to [facility] last week.  Presented to the hospital last night as the pain was not abating. Denies any chest pain at present.  Pain has been occurring intermittently and is non-exertional and non-radiating. Pt w/ extensive family hx of CAD and currently smokes 2ppd.    Now with significant 3vCAD awaiting CABG    ===========================  Interval Events  - Found to have 3vCAD  - Remains hypertensive, but improved  - No reported worsening CP/Palps/SOB\  ===========================          PMHx:   Type 2 diabetes mellitus with other neurologic complication, without long-term current use of insulin  Hypertension, unspecified type      PSHx: non-contributory      Allergies:  No Known Allergies      Home Meds: Metformin, captopril       FAMILY HISTORY: CAD in mother and father      Social History:  +2ppd smoking hx, no alcohol, or IVDU    REVIEW OF SYSTEMS:  CONSTITUTIONAL: No weakness, fevers or chills  EYES/ENT: No visual changes;  No dysphagia  NECK: No pain or stiffness  RESPIRATORY: No cough, wheezing, hemoptysis; No shortness of breath  CARDIOVASCULAR: +chest pain  GASTROINTESTINAL: No abdominal or epigastric pain. No nausea, vomiting, or hematemesis; No diarrhea or constipation. No melena or hematochezia.  BACK: No back pain  GENITOURINARY: No dysuria, frequency or hematuria  NEUROLOGICAL: No numbness or weakness  SKIN: No itching, burning, rashes, or lesions   All other review of systems is negative unless indicated above.      Physical Exam:  T(F): 98.3 (09-12), Max: 98.3 (09-12)  HR: 55 (09-12) (55 - 78)  BP: 160/78 (09-12) (160/78 - 196/83)  RR: 17 (09-12)  SpO2: 97% (09-12)    GENERAL: No acute distress  HEAD:  Atraumatic, Normocephalic  ENT: EOMI, PERRLA, conjunctiva and sclera clear, Neck supple, No JVD, moist mucosa  CHEST/LUNG: Clear to auscultation bilaterally; No wheeze, equal breath sounds bilaterally   BACK: No spinal tenderness  HEART: Regular rate and rhythm; No murmurs, rubs, or gallops  ABDOMEN: Soft, Nontender, Nondistended; Bowel sounds present  EXTREMITIES:  No clubbing, cyanosis, or edema  PSYCH: Nl behavior, nl affect  NEUROLOGY: AAOx3, non-focal, cranial nerves intact  SKIN: Normal color, No rashes or lesions    Labs Personally Reviewed 9/17/2019      12 Lead ECG (09.12.19 @ 08:55)   NSR at 82 BPM   P-R Interval 148 ms, QRS Duration 90 ms, Q-T Interval 382 ms   Axis -29 degrees   MINIMAL VOLTAGE CRITERIA FOR LVH, MAY BE NORMAL VARIANT, NONSPECIFIC ST AND T WAVE ABNORMALITY      CT Heart with Coronaries (09.12.19 @ 12:09) >  CORONARY ARTERY ANGIOGRAM FINDINGS:    Left main: The left main is a normal caliber vessel which gives rise to the LAD and circumflex arteries. Mixed plaques within left main with moderate (approximately 60-70%) stenosis.    LAD: The LAD is a normal caliber vessel. Mixed plaques within the proximal and mid LAD with severe (approximately 80-90%) stenosis. Noncalcified plaques through the distal LAD with mild (approximately 30-40%) stenosis.  Mixed plaques within the proximal D1 and through the course of the D2 branch. Evaluation of the degree of stenosis is difficult secondary to small caliber of the vessels but grossly patent.    LCX: The circumflex artery is a small caliber vessel. Mixed plaques within proximal and distal LCx with severe (approximately 80-90%) stenosis.    RCA: The right coronary artery is dominant giving rise to the posterior descending artery and a posterolateral branch. Mixed plaques within the proximal RCA with severe (100%) stenosis. There is reconstitution of the mid and distal RCA. Noncalcified plaques within the mid RCA with moderate  (approximately 70%) stenosis. Mixed plaques within the distal RCA with  mild (approximately 30-40%) stenosis.    DOMINANCE: Right coronary artery.        Xray Chest 2 Views PA/Lat (09.12.19 @ 09:45) >  INTERPRETATION:  HISTORY: Chest pain  COMPARISON: None.  FINDINGS:  The cardiac silhouette is normal in size. There are no focal consolidations or pleural effusions. The hilar and mediastinal structures appear unremarkable. The osseous structures are intact.    IMPRESSION: Clear lungs.    ORION GOMEZ M.D., ATTENDING RADIOLOGIST  This document has been electronically signed. Sep 12 2019 10:07AM      LABS:   Labs Personally Reviewed 9/16/2019               15.5   9.0   )-----------( 111      ( 12 Sep 2019 09:34 )             45.5     09-12  140  |  99  |  11  ----------------------------<  264<H>  4.0   |  25  |  0.94    Ca    9.6      12 Sep 2019 09:34    TPro  7.1  /  Alb  4.0  /  TBili  0.7  /  DBili  x   /  AST  13  /  ALT  15  /  AlkPhos  85  09-12    PT/INR - ( 12 Sep 2019 09:34 )   PT: 12.2 sec;   INR: 1.06 ratio    PTT - ( 12 Sep 2019 09:34 )  PTT:24.7 sec    CARDIAC MARKERS ( 12 Sep 2019 09:34 ) 19 ng/L / x     / x     / x     / x     / x        Assessment and Recommendation:   · Assessment		  64 M w/ HTN and T2DM.  P/W unstable angina.  CTA of  coronaries c/w  TVD with L main involvement.      REC:  CAD  - Severe 3vDisease  - Continue ASA  - Continue Lipitor  - Captopril  - Continue Hydral q8  - Continue Metoprolol 25 BID  - Continue Nicotine Patch

## 2019-09-17 NOTE — PROGRESS NOTE ADULT - PROBLEM SELECTOR PLAN 2
- Captopril held by CT surgery as to avoid ACEI/ARB prior to surgery  - Hydralazine to 100 mg TID, Metoprolol to 25 mg BID  - Appreciate cards rec

## 2019-09-17 NOTE — PROGRESS NOTE ADULT - PROBLEM SELECTOR PLAN 6
-Very mild (11.1), but uptrending  -No neutrophilic predominance  -No URI symptoms, UTI, abd pain.  -Get CXR and UA. -Very mild (11.7), but uptrending  -No neutrophilic predominance  -No URI symptoms, UTI, abd pain.  -CXR clear and UA negative.

## 2019-09-18 LAB
ALBUMIN SERPL ELPH-MCNC: 4 G/DL — SIGNIFICANT CHANGE UP (ref 3.3–5)
ALP SERPL-CCNC: 97 U/L — SIGNIFICANT CHANGE UP (ref 40–120)
ALT FLD-CCNC: 35 U/L — SIGNIFICANT CHANGE UP (ref 10–45)
ANION GAP SERPL CALC-SCNC: 13 MMOL/L — SIGNIFICANT CHANGE UP (ref 5–17)
APTT BLD: 39.2 SEC — HIGH (ref 27.5–36.3)
AST SERPL-CCNC: 20 U/L — SIGNIFICANT CHANGE UP (ref 10–40)
BASOPHILS # BLD AUTO: 0.1 K/UL — SIGNIFICANT CHANGE UP (ref 0–0.2)
BASOPHILS NFR BLD AUTO: 1.1 % — SIGNIFICANT CHANGE UP (ref 0–2)
BILIRUB SERPL-MCNC: 0.9 MG/DL — SIGNIFICANT CHANGE UP (ref 0.2–1.2)
BLD GP AB SCN SERPL QL: NEGATIVE — SIGNIFICANT CHANGE UP
BUN SERPL-MCNC: 21 MG/DL — SIGNIFICANT CHANGE UP (ref 7–23)
CALCIUM SERPL-MCNC: 9.9 MG/DL — SIGNIFICANT CHANGE UP (ref 8.4–10.5)
CHLORIDE SERPL-SCNC: 98 MMOL/L — SIGNIFICANT CHANGE UP (ref 96–108)
CO2 SERPL-SCNC: 25 MMOL/L — SIGNIFICANT CHANGE UP (ref 22–31)
CREAT SERPL-MCNC: 1.09 MG/DL — SIGNIFICANT CHANGE UP (ref 0.5–1.3)
EOSINOPHIL # BLD AUTO: 0.4 K/UL — SIGNIFICANT CHANGE UP (ref 0–0.5)
EOSINOPHIL NFR BLD AUTO: 4 % — SIGNIFICANT CHANGE UP (ref 0–6)
FRUCTOSAMINE SERPL-MCNC: 265 UMOL/L — SIGNIFICANT CHANGE UP (ref 205–285)
GLUCOSE SERPL-MCNC: 179 MG/DL — HIGH (ref 70–99)
HCT VFR BLD CALC: 46.1 % — SIGNIFICANT CHANGE UP (ref 39–50)
HGB BLD-MCNC: 15.1 G/DL — SIGNIFICANT CHANGE UP (ref 13–17)
INR BLD: 1 RATIO — SIGNIFICANT CHANGE UP (ref 0.88–1.16)
LYMPHOCYTES # BLD AUTO: 29.6 % — SIGNIFICANT CHANGE UP (ref 13–44)
LYMPHOCYTES # BLD AUTO: 3.2 K/UL — SIGNIFICANT CHANGE UP (ref 1–3.3)
MAGNESIUM SERPL-MCNC: 1.8 MG/DL — SIGNIFICANT CHANGE UP (ref 1.6–2.6)
MCHC RBC-ENTMCNC: 30.1 PG — SIGNIFICANT CHANGE UP (ref 27–34)
MCHC RBC-ENTMCNC: 32.9 GM/DL — SIGNIFICANT CHANGE UP (ref 32–36)
MCV RBC AUTO: 91.6 FL — SIGNIFICANT CHANGE UP (ref 80–100)
MONOCYTES # BLD AUTO: 1 K/UL — HIGH (ref 0–0.9)
MONOCYTES NFR BLD AUTO: 9.2 % — SIGNIFICANT CHANGE UP (ref 2–14)
NEUTROPHILS # BLD AUTO: 6.2 K/UL — SIGNIFICANT CHANGE UP (ref 1.8–7.4)
NEUTROPHILS NFR BLD AUTO: 56.2 % — SIGNIFICANT CHANGE UP (ref 43–77)
PA ADP PRP-ACNC: 196 PRU — SIGNIFICANT CHANGE UP (ref 194–417)
PHOSPHATE SERPL-MCNC: 3.6 MG/DL — SIGNIFICANT CHANGE UP (ref 2.5–4.5)
PLATELET # BLD AUTO: 134 K/UL — LOW (ref 150–400)
POTASSIUM SERPL-MCNC: 3.9 MMOL/L — SIGNIFICANT CHANGE UP (ref 3.5–5.3)
POTASSIUM SERPL-SCNC: 3.9 MMOL/L — SIGNIFICANT CHANGE UP (ref 3.5–5.3)
PROT SERPL-MCNC: 6.9 G/DL — SIGNIFICANT CHANGE UP (ref 6–8.3)
PROTHROM AB SERPL-ACNC: 11.4 SEC — SIGNIFICANT CHANGE UP (ref 10–12.9)
RBC # BLD: 5.03 M/UL — SIGNIFICANT CHANGE UP (ref 4.2–5.8)
RBC # FLD: 12.7 % — SIGNIFICANT CHANGE UP (ref 10.3–14.5)
RH IG SCN BLD-IMP: POSITIVE — SIGNIFICANT CHANGE UP
SODIUM SERPL-SCNC: 136 MMOL/L — SIGNIFICANT CHANGE UP (ref 135–145)
WBC # BLD: 11 K/UL — HIGH (ref 3.8–10.5)
WBC # FLD AUTO: 11 K/UL — HIGH (ref 3.8–10.5)

## 2019-09-18 PROCEDURE — 99233 SBSQ HOSP IP/OBS HIGH 50: CPT

## 2019-09-18 PROCEDURE — 99232 SBSQ HOSP IP/OBS MODERATE 35: CPT | Mod: GC

## 2019-09-18 RX ORDER — NICOTINE POLACRILEX 2 MG
2 GUM BUCCAL EVERY 4 HOURS
Refills: 0 | Status: DISCONTINUED | OUTPATIENT
Start: 2019-09-18 | End: 2019-09-19

## 2019-09-18 RX ORDER — CHLORHEXIDINE GLUCONATE 213 G/1000ML
30 SOLUTION TOPICAL ONCE
Refills: 0 | Status: COMPLETED | OUTPATIENT
Start: 2019-09-18 | End: 2019-09-19

## 2019-09-18 RX ORDER — INSULIN GLARGINE 100 [IU]/ML
15 INJECTION, SOLUTION SUBCUTANEOUS AT BEDTIME
Refills: 0 | Status: DISCONTINUED | OUTPATIENT
Start: 2019-09-18 | End: 2019-09-18

## 2019-09-18 RX ORDER — CHLORHEXIDINE GLUCONATE 213 G/1000ML
1 SOLUTION TOPICAL ONCE
Refills: 0 | Status: COMPLETED | OUTPATIENT
Start: 2019-09-18 | End: 2019-09-18

## 2019-09-18 RX ORDER — INSULIN GLARGINE 100 [IU]/ML
7 INJECTION, SOLUTION SUBCUTANEOUS AT BEDTIME
Refills: 0 | Status: DISCONTINUED | OUTPATIENT
Start: 2019-09-18 | End: 2019-09-19

## 2019-09-18 RX ORDER — CHLORHEXIDINE GLUCONATE 213 G/1000ML
1 SOLUTION TOPICAL ONCE
Refills: 0 | Status: COMPLETED | OUTPATIENT
Start: 2019-09-19 | End: 2019-09-19

## 2019-09-18 RX ORDER — INSULIN LISPRO 100/ML
7 VIAL (ML) SUBCUTANEOUS
Refills: 0 | Status: DISCONTINUED | OUTPATIENT
Start: 2019-09-18 | End: 2019-09-19

## 2019-09-18 RX ORDER — CEFUROXIME AXETIL 250 MG
1500 TABLET ORAL ONCE
Refills: 0 | Status: DISCONTINUED | OUTPATIENT
Start: 2019-09-18 | End: 2019-09-19

## 2019-09-18 RX ADMIN — ATORVASTATIN CALCIUM 80 MILLIGRAM(S): 80 TABLET, FILM COATED ORAL at 21:06

## 2019-09-18 RX ADMIN — Medication 1 PATCH: at 12:22

## 2019-09-18 RX ADMIN — INSULIN GLARGINE 7 UNIT(S): 100 INJECTION, SOLUTION SUBCUTANEOUS at 22:37

## 2019-09-18 RX ADMIN — Medication 7 UNIT(S): at 13:09

## 2019-09-18 RX ADMIN — Medication 2: at 09:19

## 2019-09-18 RX ADMIN — Medication 25 MILLIGRAM(S): at 17:12

## 2019-09-18 RX ADMIN — Medication 81 MILLIGRAM(S): at 09:19

## 2019-09-18 RX ADMIN — Medication 1 PATCH: at 12:21

## 2019-09-18 RX ADMIN — HEPARIN SODIUM 5000 UNIT(S): 5000 INJECTION INTRAVENOUS; SUBCUTANEOUS at 21:06

## 2019-09-18 RX ADMIN — Medication 1 PATCH: at 07:30

## 2019-09-18 RX ADMIN — Medication 100 MILLIGRAM(S): at 21:06

## 2019-09-18 RX ADMIN — Medication 2 MILLIGRAM(S): at 17:34

## 2019-09-18 RX ADMIN — Medication 100 MILLIGRAM(S): at 05:16

## 2019-09-18 RX ADMIN — Medication 100 MILLIGRAM(S): at 12:22

## 2019-09-18 RX ADMIN — Medication 25 MILLIGRAM(S): at 05:16

## 2019-09-18 RX ADMIN — Medication 7 UNIT(S): at 21:03

## 2019-09-18 RX ADMIN — Medication 1 PATCH: at 19:51

## 2019-09-18 RX ADMIN — Medication 2: at 13:09

## 2019-09-18 RX ADMIN — HEPARIN SODIUM 5000 UNIT(S): 5000 INJECTION INTRAVENOUS; SUBCUTANEOUS at 12:22

## 2019-09-18 RX ADMIN — HEPARIN SODIUM 5000 UNIT(S): 5000 INJECTION INTRAVENOUS; SUBCUTANEOUS at 05:16

## 2019-09-18 RX ADMIN — CHLORHEXIDINE GLUCONATE 1 APPLICATION(S): 213 SOLUTION TOPICAL at 20:56

## 2019-09-18 NOTE — PROGRESS NOTE ADULT - SUBJECTIVE AND OBJECTIVE BOX
INTERVAL EVENTS: TROY    SUBJECTIVE: No acute complaints.    ROS:  General - No fevers or chills  Cards - No chest pain or palpitations  Pulm - No cough or shortness of breath  GI - No abdominal pain, diarrhea, melena, or hematochezia. No constipation.   - No dysuria or hematuria    OBJECTIVE:  Vitals Last 24 hrs  Vital Signs Last 24 Hrs  T(C): 36.4 (18 Sep 2019 04:29), Max: 36.8 (17 Sep 2019 12:35)  T(F): 97.5 (18 Sep 2019 04:29), Max: 98.3 (17 Sep 2019 21:05)  HR: 79 (18 Sep 2019 04:29) (78 - 86)  BP: 121/65 (18 Sep 2019 04:29) (121/65 - 153/77)  BP(mean): --  RR: 18 (18 Sep 2019 04:29) (18 - 19)  SpO2: 96% (18 Sep 2019 04:29) (96% - 100%)    IOs  I&O's Summary    17 Sep 2019 07:01  -  18 Sep 2019 07:00  --------------------------------------------------------  IN: 360 mL / OUT: 0 mL / NET: 360 mL        PE:  General - NAD  Heart - Normal S1 and S2. No murmurs auscultated.  Lungs - Clear to auscultation bilaterally  GI - Abdomen soft, NT, ND.  Extremities - No lower extremity edema. Peripheral pulses intact. Calves nontender.  Skin - No rashes on extremities    LABS:  09-18    136  |  98  |  21  ----------------------------<  179<H>  3.9   |  25  |  1.09    Ca    9.9      18 Sep 2019 06:14  Phos  3.6     09-18  Mg     1.8     09-18    TPro  6.9  /  Alb  4.0  /  TBili  0.9  /  DBili  x   /  AST  20  /  ALT  35  /  AlkPhos  97  09-18                            15.1   11.0  )-----------( 134      ( 18 Sep 2019 06:13 )             46.1       P2y12 196 (nr [id number])    IMAGING: No new imaging    Tele: Sinus 68-91

## 2019-09-18 NOTE — PROGRESS NOTE ADULT - ASSESSMENT
64 year-old [place] male with history of hypertension, T2DM, and current smoker admitted with unstable angina found on coronary catheterization to have triple vessel disease, pending CABG.

## 2019-09-18 NOTE — PROGRESS NOTE ADULT - ATTENDING COMMENTS
-Patient seen/examined on 9/18/19. Case/plan discussed with the intern and resident as reviewed/edited by me above and in any comments below.  -Updated patient and his wife at bedside.  -OR tomorrow.

## 2019-09-18 NOTE — CONSULT NOTE ADULT - ASSESSMENT
Assessment:  DMT2: 64y Male with DM T2 with hyperglycemia, A1C 9.2%, was on oral meds at home (Metformin 500mg QD) now on coverage scale, blood sugars running high and not at target, had no hypoglycemic episode, eating meals, comfortable.  CAD: CABG scheduled 9/19, undergoing cardiac workup now, on medications, no chest pain, stable, monitored.  HTN: Controlled,  on antihypertensive medications.          Brandi Correia MD  Cell: [phone]  Office: [phone] Assessment:  DMT2: 64y Male with DM T2 with hyperglycemia, A1C 9.2%, was on oral meds at home (Metformin 500mg QD) now on coverage scale,  blood sugars running high and not at target, had no hypoglycemic episode, eating meals, comfortable.  CAD: CABG scheduled 9/19, undergoing cardiac workup now, on medications, no chest pain, stable, monitored.  HTN: Controlled,  on antihypertensive medications.          Brandi Correia MD  Cell: [phone]  Office: [phone]

## 2019-09-18 NOTE — PROGRESS NOTE ADULT - PROBLEM SELECTOR PLAN 5
DVT PPx: Subcutaneous heparin  Diet: Carb consistent/DASH DVT PPx: Subcutaneous heparin. D/c tomorrow.  Diet: Carb consistent/DASH DVT PPx: Subcutaneous heparin. D/c tomorrow for procedure.  Diet: Carb consistent/DASH

## 2019-09-18 NOTE — PROGRESS NOTE ADULT - PROBLEM SELECTOR PLAN 3
HbA1c 9.2%. BGs mid 100s  - ISS, but low requirements  - Given low requirements and very high A1c, checking fructosamine.  - D/c on insulin given HbA1c HbA1c 9.2%. BGs mid 100s  - ISS, but low requirements  - Fructosamine normal  - D/c on insulin given HbA1c HbA1c 9.2%. BGs mid 100-200's  - ISS, but low requirements  - Fructosamine normal  - D/c on insulin given HbA1c

## 2019-09-18 NOTE — PROVIDER CONTACT NOTE (OTHER) - SITUATION
Patient states that when he was brushing his teeth and rinsed out his mouth he saw blood. Patient has an active order for Heparin Injectable Subcutaneous 5000 Units every 8 hours.

## 2019-09-18 NOTE — PROGRESS NOTE ADULT - ASSESSMENT
65 y/o male traveler from [facility] with history of T2DM, HTN and current 2ppd smoking who presents with 6 days of left sternal chest pain and a right-sided headache with posterior right neck pain that has been present for two weeks as well. This is intermittent and lasts for 15 minutes. In ED, EKG w nonspecific ST and T wave abnormality and possible ectopic atrial rhythm. Troponins normal. CXR shows clear lungs. CT Coronary was performed to r/o PE, showed significant calcification and no pulmonary embolus. Cardiology consulted, now s/p cardiac cath demonstrating 3VD and CT surgery consulted for CABG evaluation.

## 2019-09-18 NOTE — PROGRESS NOTE ADULT - PROBLEM SELECTOR PLAN 1
Worsening non-exertional chest pain x 6 days without troponin elevation or change. Triple vessel disease (cath 9/12)  - Cards following  - ASA 81 mg, Atorvastatin 80 mg  - CT surg following, CABG postponed due to P2Y12 level too low. Tentatively planned for Thurs  -Preop labs 500 tomorrow and d/c subcutaneous heparin tomorrow morning Worsening non-exertional chest pain x 6 days without troponin elevation or change. Triple vessel disease (cath 9/12)  - Cards following  - ASA 81 mg, Atorvastatin 80 mg  - CT surg following, CABG postponed due to P2Y12 level too low. Tentatively planned for Thurs  -Preop labs 500 tomorrow and d/c subcutaneous heparin tomorrow morning  -NPO at midnight.

## 2019-09-18 NOTE — PROGRESS NOTE ADULT - SUBJECTIVE AND OBJECTIVE BOX
Consult Follow Up Note  Referring: Luis Fernando  Reason: CP/Significant 3vCAD  HPI:  64 M w/ HTN and T2DM, p/w L substernal chest pressure on and off since returning from a trip to [facility] last week.  Presented to the hospital last night as the pain was not abating. Denies any chest pain at present.  Pain has been occurring intermittently and is non-exertional and non-radiating. Pt w/ extensive family hx of CAD and currently smokes 2ppd.    Now with significant 3vCAD awaiting CABG    ===========================  Interval Events  - Found to have 3vCAD  - Remains hypertensive, but improved  - No reported worsening CP/Palps/SOB\  ===========================          PMHx:   Type 2 diabetes mellitus with other neurologic complication, without long-term current use of insulin  Hypertension, unspecified type      PSHx: non-contributory      Allergies:  No Known Allergies      Home Meds: Metformin, captopril       FAMILY HISTORY: CAD in mother and father      Social History:  +2ppd smoking hx, no alcohol, or IVDU    REVIEW OF SYSTEMS:  CONSTITUTIONAL: No weakness, fevers or chills  EYES/ENT: No visual changes;  No dysphagia  NECK: No pain or stiffness  RESPIRATORY: No cough, wheezing, hemoptysis; No shortness of breath  CARDIOVASCULAR: +chest pain  GASTROINTESTINAL: No abdominal or epigastric pain. No nausea, vomiting, or hematemesis; No diarrhea or constipation. No melena or hematochezia.  BACK: No back pain  GENITOURINARY: No dysuria, frequency or hematuria  NEUROLOGICAL: No numbness or weakness  SKIN: No itching, burning, rashes, or lesions   All other review of systems is negative unless indicated above.      Physical Exam:  T(F): 98.3 (09-12), Max: 98.3 (09-12)  HR: 55 (09-12) (55 - 78)  BP: 160/78 (09-12) (160/78 - 196/83)  RR: 17 (09-12)  SpO2: 97% (09-12)    GENERAL: No acute distress  HEAD:  Atraumatic, Normocephalic  ENT: EOMI, PERRLA, conjunctiva and sclera clear, Neck supple, No JVD, moist mucosa  CHEST/LUNG: Clear to auscultation bilaterally; No wheeze, equal breath sounds bilaterally   BACK: No spinal tenderness  HEART: Regular rate and rhythm; No murmurs, rubs, or gallops  ABDOMEN: Soft, Nontender, Nondistended; Bowel sounds present  EXTREMITIES:  No clubbing, cyanosis, or edema  PSYCH: Nl behavior, nl affect  NEUROLOGY: AAOx3, non-focal, cranial nerves intact  SKIN: Normal color, No rashes or lesions    Labs Personally Reviewed 9/18/2019      12 Lead ECG (09.12.19 @ 08:55)   NSR at 82 BPM   P-R Interval 148 ms, QRS Duration 90 ms, Q-T Interval 382 ms   Axis -29 degrees   MINIMAL VOLTAGE CRITERIA FOR LVH, MAY BE NORMAL VARIANT, NONSPECIFIC ST AND T WAVE ABNORMALITY      CT Heart with Coronaries (09.12.19 @ 12:09) >  CORONARY ARTERY ANGIOGRAM FINDINGS:    Left main: The left main is a normal caliber vessel which gives rise to the LAD and circumflex arteries. Mixed plaques within left main with moderate (approximately 60-70%) stenosis.    LAD: The LAD is a normal caliber vessel. Mixed plaques within the proximal and mid LAD with severe (approximately 80-90%) stenosis. Noncalcified plaques through the distal LAD with mild (approximately 30-40%) stenosis.  Mixed plaques within the proximal D1 and through the course of the D2 branch. Evaluation of the degree of stenosis is difficult secondary to small caliber of the vessels but grossly patent.    LCX: The circumflex artery is a small caliber vessel. Mixed plaques within proximal and distal LCx with severe (approximately 80-90%) stenosis.    RCA: The right coronary artery is dominant giving rise to the posterior descending artery and a posterolateral branch. Mixed plaques within the proximal RCA with severe (100%) stenosis. There is reconstitution of the mid and distal RCA. Noncalcified plaques within the mid RCA with moderate  (approximately 70%) stenosis. Mixed plaques within the distal RCA with  mild (approximately 30-40%) stenosis.    DOMINANCE: Right coronary artery.        Xray Chest 2 Views PA/Lat (09.12.19 @ 09:45) >  INTERPRETATION:  HISTORY: Chest pain  COMPARISON: None.  FINDINGS:  The cardiac silhouette is normal in size. There are no focal consolidations or pleural effusions. The hilar and mediastinal structures appear unremarkable. The osseous structures are intact.    IMPRESSION: Clear lungs.    ORION GOMEZ M.D., ATTENDING RADIOLOGIST  This document has been electronically signed. Sep 12 2019 10:07AM      LABS:   Labs Personally Reviewed 9/18/2019               15.5   9.0   )-----------( 111      ( 12 Sep 2019 09:34 )             45.5     09-12  140  |  99  |  11  ----------------------------<  264<H>  4.0   |  25  |  0.94    Ca    9.6      12 Sep 2019 09:34    TPro  7.1  /  Alb  4.0  /  TBili  0.7  /  DBili  x   /  AST  13  /  ALT  15  /  AlkPhos  85  09-12    PT/INR - ( 12 Sep 2019 09:34 )   PT: 12.2 sec;   INR: 1.06 ratio    PTT - ( 12 Sep 2019 09:34 )  PTT:24.7 sec    CARDIAC MARKERS ( 12 Sep 2019 09:34 ) 19 ng/L / x     / x     / x     / x     / x        Assessment and Recommendation:   · Assessment		  64 M w/ HTN and T2DM.  P/W unstable angina.  CTA of  coronaries c/w  TVD with L main involvement.      REC:  CAD  - Severe 3vDisease  - Continue ASA  - Continue Lipitor  - Captopril  - Continue Hydral q8  - Continue Metoprolol 25 BID  - Continue Nicotine Patch

## 2019-09-18 NOTE — CONSULT NOTE ADULT - SUBJECTIVE AND OBJECTIVE BOX
HPI:  64 year-old [place] male with history of T2DM, HTN and current 2ppd smoking who presents with 6 days of left sternal chest pain. States he arrived from [facility] on Friday night, when he felt this pain for the first time. After this, it happened about once per day. Each of these times, he says it lasted about 2-3 minutes and was about a 5-6/10 in intensity. Each time, he was not exerting himself, but doing light activity such as bathing or bending over to pick something up. The pain is located at the left sternal border and is non-radiating. This morning, he was sleeping at about 3am and experienced more severe chest pain that woke him up. This lasted about 30 minutes and was 8/10 in intensity, which is what prompted him to come to the hospital. The pain was never associated with shortness of breath, sweating, or nausea. He has noticed a new cough that has been worse in the past week as well. He has never experienced this chest pain before. He takes metformin for diabetes and captopril for hypertension, but does not know the doses and states that he ran out of these of Tuesday.     He complains of a right-sided headache with posterior right neck pain that has been present for two weeks as well. This is intermittent and lasts for 15 minutes.     ED Course: EKG w nonspecific ST and T wave abnormality and possible ectopic atrial rhythm. Troponin 19-->20. Plt 111. Hb 15.5, WBC 9.0, D-dimer 505, INR 1.06, pTT 24.7, Cr 0.94, . CXR shows clear lungs. CT Coronary was performed to r/o PE, showed significant calcification and no pulmonary embolus. Cardiology consulted, admitted for cardiac cath. (12 Sep 2019 16:34)    Patient has history of diabetes, A1C 9.2%, on oral meds at home (Metformin 500mg once daily), states his normal range at home is "around 145", no recent hypoglycemic episodes, no polyuria polydipsia. Patient follows up with PCP for diabetes management.    PAST MEDICAL & SURGICAL HISTORY:  Type 2 diabetes mellitus with other neurologic complication, without long-term current use of insulin  Hypertension, unspecified type  H/O elbow surgery: Right elbow surgery 2007      FAMILY HISTORY:  FHx: hyperlipidemia  FHx: hypertension  FH: type 2 diabetes  Family history of breast cancer in sister  FH: coronary artery disease      Social History:    Outpatient Medications:    MEDICATIONS  (STANDING):  aspirin enteric coated 81 milliGRAM(s) Oral daily  atorvastatin 80 milliGRAM(s) Oral at bedtime  cefuroxime  IVPB 1500 milliGRAM(s) IV Intermittent once  chlorhexidine 0.12% Liquid 30 milliLiter(s) Swish and Spit once  chlorhexidine 4% Liquid 1 Application(s) Topical once  dextrose 5%. 1000 milliLiter(s) (50 mL/Hr) IV Continuous <Continuous>  dextrose 50% Injectable 12.5 Gram(s) IV Push once  dextrose 50% Injectable 25 Gram(s) IV Push once  dextrose 50% Injectable 25 Gram(s) IV Push once  heparin  Injectable 5000 Unit(s) SubCutaneous every 8 hours  hydrALAZINE 100 milliGRAM(s) Oral every 8 hours  influenza   Vaccine 0.5 milliLiter(s) IntraMuscular once  insulin lispro (HumaLOG) corrective regimen sliding scale   SubCutaneous three times a day before meals  insulin lispro (HumaLOG) corrective regimen sliding scale   SubCutaneous at bedtime  metoprolol tartrate 25 milliGRAM(s) Oral two times a day  nicotine -  14 mG/24Hr(s) Patch 1 patch Transdermal daily    MEDICATIONS  (PRN):  dextrose 40% Gel 15 Gram(s) Oral once PRN Blood Glucose LESS THAN 70 milliGRAM(s)/deciliter  glucagon  Injectable 1 milliGRAM(s) IntraMuscular once PRN Glucose LESS THAN 70 milligrams/deciliter  nicotine  Polacrilex Lozenge 2 milliGRAM(s) Oral every 4 hours PRN Craving      Allergies    No Known Allergies    Intolerances      Review of Systems:  Constitutional: No fever, no chills  Eyes: No blurry vision  Neuro: No tremors  HEENT: No pain, no neck swelling  Cardiovascular: No chest pain, no palpitations  Respiratory: Has SOB, no cough  GI: No nausea, vomiting, abdominal pain  : No dysuria  Skin: no rash  MSK: Has leg swelling.  Psych: no depression  Endocrine: no polyuria, polydipsia    ALL OTHER SYSTEMS REVIEWED AND NEGATIVE    UNABLE TO OBTAIN    PHYSICAL EXAM:  VITALS: T(C): 36.4 (09-18-19 @ 04:29)  T(F): 97.5 (09-18-19 @ 04:29), Max: 98.3 (09-17-19 @ 21:05)  HR: 79 (09-18-19 @ 04:29) (78 - 86)  BP: 121/65 (09-18-19 @ 04:29) (121/65 - 153/77)  RR:  (18 - 19)  SpO2:  (96% - 100%)  Wt(kg): --  GENERAL: NAD, well-groomed, well-developed  EYES: No proptosis, no lid lag  HEENT:  Atraumatic, Normocephalic  THYROID: Normal size, no palpable nodules  RESPIRATORY: Clear to auscultation bilaterally; No rales, rhonchi, wheezing  CARDIOVASCULAR: Si S2, No murmurs;  GI: Soft, non distended, normal bowel sounds  SKIN: Dry, intact, No rashes or lesions  MUSCULOSKELETAL: Has BL lower extremity edema.  NEURO:  no tremor, sensation decreased in feet BL,    POCT Blood Glucose.: 205 mg/dL (09-18-19 @ 08:45)  POCT Blood Glucose.: 138 mg/dL (09-17-19 @ 22:45)  POCT Blood Glucose.: 188 mg/dL (09-17-19 @ 17:48)  POCT Blood Glucose.: 189 mg/dL (09-17-19 @ 13:09)  POCT Blood Glucose.: 191 mg/dL (09-17-19 @ 08:53)  POCT Blood Glucose.: 196 mg/dL (09-16-19 @ 21:13)  POCT Blood Glucose.: 163 mg/dL (09-16-19 @ 17:31)  POCT Blood Glucose.: 162 mg/dL (09-16-19 @ 12:49)  POCT Blood Glucose.: 190 mg/dL (09-16-19 @ 08:39)  POCT Blood Glucose.: 177 mg/dL (09-15-19 @ 21:27)  POCT Blood Glucose.: 156 mg/dL (09-15-19 @ 17:24)  POCT Blood Glucose.: 196 mg/dL (09-15-19 @ 12:58)                            15.1   11.0  )-----------( 134      ( 18 Sep 2019 06:13 )             46.1       09-18    136  |  98  |  21  ----------------------------<  179<H>  3.9   |  25  |  1.09    EGFR if : 84  EGFR if non : 72    Ca    9.9      09-18  Mg     1.8     09-18  Phos  3.6     09-18    TPro  6.9  /  Alb  4.0  /  TBili  0.9  /  DBili  x   /  AST  20  /  ALT  35  /  AlkPhos  97  09-18      Thyroid Function Tests:  09-12 @ 22:24 TSH 1.46 FreeT4 -- T3 106 Anti TPO -- Anti Thyroglobulin Ab -- TSI --      Hemoglobin A1C, Whole Blood: 9.2 % <H> [4.0 - 5.6] (09-13-19 @ 08:55)  Hemoglobin A1C, Whole Blood: 9.3 % <H> [4.0 - 5.6] (09-12-19 @ 22:24)      09-13 Chol 189 <H> HDL 25<L> Trig 189<H>    Radiology: HPI:  64 year-old [place] male with history of T2DM, HTN  and current 2ppd smoking who presents with 6 days of left sternal chest pain. States he arrived from [facility] on Friday night, when he felt this pain for the first time. After this, it happened about once per day. Each of these times, he says it lasted about 2-3 minutes and was about a 5-6/10 in intensity. Each time, he was not exerting himself, but doing light activity such as bathing or bending over to pick something up. The pain is located at the left sternal border and is non-radiating. This morning, he was sleeping at about 3am and experienced more severe chest pain that woke him up. This lasted about 30 minutes and was 8/10 in intensity, which is what prompted him to come to the hospital. The pain was never associated with shortness of breath, sweating, or nausea. He has noticed a new cough that has been worse in the past week as well. He has never experienced this chest pain before. He takes metformin for diabetes and captopril for hypertension, but does not know the doses and states that he ran out of these of Tuesday.     He complains of a right-sided headache with posterior right neck pain that has been present for two weeks as well. This is intermittent and lasts for 15 minutes.     ED Course: EKG w nonspecific ST and T wave abnormality and possible ectopic atrial rhythm. Troponin 19-->20. Plt 111. Hb 15.5, WBC 9.0, D-dimer 505, INR 1.06, pTT 24.7, Cr 0.94, . CXR shows clear lungs. CT Coronary was performed to r/o PE, showed significant calcification and no pulmonary embolus. Cardiology consulted, admitted for cardiac cath. (12 Sep 2019 16:34)    Patient has history of diabetes, A1C 9.2%, on oral meds at home (Metformin 500mg once daily), states his normal range at home is "around 145", no recent hypoglycemic episodes, no polyuria polydipsia. Patient follows up with PCP for diabetes management.    PAST MEDICAL & SURGICAL HISTORY:  Type 2 diabetes mellitus with other neurologic complication, without long-term current use of insulin  Hypertension, unspecified type  H/O elbow surgery: Right elbow surgery 2007      FAMILY HISTORY:  FHx: hyperlipidemia  FHx: hypertension  FH: type 2 diabetes  Family history of breast cancer in sister  FH: coronary artery disease      Social History:    Outpatient Medications:    MEDICATIONS  (STANDING):  aspirin enteric coated 81 milliGRAM(s) Oral daily  atorvastatin 80 milliGRAM(s) Oral at bedtime  cefuroxime  IVPB 1500 milliGRAM(s) IV Intermittent once  chlorhexidine 0.12% Liquid 30 milliLiter(s) Swish and Spit once  chlorhexidine 4% Liquid 1 Application(s) Topical once  dextrose 5%. 1000 milliLiter(s) (50 mL/Hr) IV Continuous <Continuous>  dextrose 50% Injectable 12.5 Gram(s) IV Push once  dextrose 50% Injectable 25 Gram(s) IV Push once  dextrose 50% Injectable 25 Gram(s) IV Push once  heparin  Injectable 5000 Unit(s) SubCutaneous every 8 hours  hydrALAZINE 100 milliGRAM(s) Oral every 8 hours  influenza   Vaccine 0.5 milliLiter(s) IntraMuscular once  insulin lispro (HumaLOG) corrective regimen sliding scale   SubCutaneous three times a day before meals  insulin lispro (HumaLOG) corrective regimen sliding scale   SubCutaneous at bedtime  metoprolol tartrate 25 milliGRAM(s) Oral two times a day  nicotine -  14 mG/24Hr(s) Patch 1 patch Transdermal daily    MEDICATIONS  (PRN):  dextrose 40% Gel 15 Gram(s) Oral once PRN Blood Glucose LESS THAN 70 milliGRAM(s)/deciliter  glucagon  Injectable 1 milliGRAM(s) IntraMuscular once PRN Glucose LESS THAN 70 milligrams/deciliter  nicotine  Polacrilex Lozenge 2 milliGRAM(s) Oral every 4 hours PRN Craving      Allergies    No Known Allergies    Intolerances      Review of Systems:  Constitutional: No fever, no chills  Eyes: No blurry vision  Neuro: No tremors  HEENT: No pain, no neck swelling  Cardiovascular: No chest pain, no palpitations  Respiratory: Has SOB, no cough  GI: No nausea, vomiting, abdominal pain  : No dysuria  Skin: no rash  MSK: Has leg swelling.  Psych: no depression  Endocrine: no polyuria, polydipsia    ALL OTHER SYSTEMS REVIEWED AND NEGATIVE    UNABLE TO OBTAIN    PHYSICAL EXAM:  VITALS: T(C): 36.4 (09-18-19 @ 04:29)  T(F): 97.5 (09-18-19 @ 04:29), Max: 98.3 (09-17-19 @ 21:05)  HR: 79 (09-18-19 @ 04:29) (78 - 86)  BP: 121/65 (09-18-19 @ 04:29) (121/65 - 153/77)  RR:  (18 - 19)  SpO2:  (96% - 100%)  Wt(kg): --  GENERAL: NAD, well-groomed, well-developed  EYES: No proptosis, no lid lag  HEENT:  Atraumatic, Normocephalic  THYROID: Normal size, no palpable nodules  RESPIRATORY: Clear to auscultation bilaterally; No rales, rhonchi, wheezing  CARDIOVASCULAR: Si S2, No murmurs;  GI: Soft, non distended, normal bowel sounds  SKIN: Dry, intact, No rashes or lesions  MUSCULOSKELETAL: Has BL lower extremity edema.  NEURO:  no tremor, sensation decreased in feet BL,    POCT Blood Glucose.: 205 mg/dL (09-18-19 @ 08:45)  POCT Blood Glucose.: 138 mg/dL (09-17-19 @ 22:45)  POCT Blood Glucose.: 188 mg/dL (09-17-19 @ 17:48)  POCT Blood Glucose.: 189 mg/dL (09-17-19 @ 13:09)  POCT Blood Glucose.: 191 mg/dL (09-17-19 @ 08:53)  POCT Blood Glucose.: 196 mg/dL (09-16-19 @ 21:13)  POCT Blood Glucose.: 163 mg/dL (09-16-19 @ 17:31)  POCT Blood Glucose.: 162 mg/dL (09-16-19 @ 12:49)  POCT Blood Glucose.: 190 mg/dL (09-16-19 @ 08:39)  POCT Blood Glucose.: 177 mg/dL (09-15-19 @ 21:27)  POCT Blood Glucose.: 156 mg/dL (09-15-19 @ 17:24)  POCT Blood Glucose.: 196 mg/dL (09-15-19 @ 12:58)                            15.1   11.0  )-----------( 134      ( 18 Sep 2019 06:13 )             46.1       09-18    136  |  98  |  21  ----------------------------<  179<H>  3.9   |  25  |  1.09    EGFR if : 84  EGFR if non : 72    Ca    9.9      09-18  Mg     1.8     09-18  Phos  3.6     09-18    TPro  6.9  /  Alb  4.0  /  TBili  0.9  /  DBili  x   /  AST  20  /  ALT  35  /  AlkPhos  97  09-18      Thyroid Function Tests:  09-12 @ 22:24 TSH 1.46 FreeT4 -- T3 106 Anti TPO -- Anti Thyroglobulin Ab -- TSI --      Hemoglobin A1C, Whole Blood: 9.2 % <H> [4.0 - 5.6] (09-13-19 @ 08:55)  Hemoglobin A1C, Whole Blood: 9.3 % <H> [4.0 - 5.6] (09-12-19 @ 22:24)      09-13 Chol 189 <H> HDL 25<L> Trig 189<H>    Radiology:

## 2019-09-18 NOTE — PROGRESS NOTE ADULT - SUBJECTIVE AND OBJECTIVE BOX
Cardiac Surgery Pre-op Note:     Surgeon: Dr. Santos    Procedure: 19 CABGx3    HPI:  64 year-old [place] male with history of T2DM, HTN and current 2ppd smoking who presents with 6 days of left sternal chest pain. States he arrived from [facility] on Friday night, when he felt this pain for the first time. After this, it happened about once per day. Each of these times, he says it lasted about 2-3 minutes and was about a 5-6/10 in intensity. Each time, he was not exerting himself, but doing light activity such as bathing or bending over to pick something up. The pain is located at the left sternal border and is non-radiating. This morning, he was sleeping at about 3am and experienced more severe chest pain that woke him up. This lasted about 30 minutes and was 8/10 in intensity, which is what prompted him to come to the hospital. The pain was never associated with shortness of breath, sweating, or nausea. He has noticed a new cough that has been worse in the past week as well. He has never experienced this chest pain before. He takes metformin for diabetes and captopril for hypertension, but does not know the doses and states that he ran out of these of Tuesday.   He complains of a right-sided headache with posterior right neck pain that has been present for two weeks as well. This is intermittent and lasts for 15 minutes.     ED Course: EKG w nonspecific ST and T wave abnormality and possible ectopic atrial rhythm. Troponin 19-->20. Plt 111. Hb 15.5, WBC 9.0, D-dimer 505, INR 1.06, pTT 24.7, Cr 0.94, . CXR shows clear lungs. CT Coronary was performed to r/o PE, showed significant calcification and no pulmonary embolus. Cardiology consulted, admitted for cardiac cath. (12 Sep 2019 16:34)      PAST MEDICAL & SURGICAL HISTORY:  Type 2 diabetes mellitus with other neurologic complication, without long-term current use of insulin  Hypertension, unspecified type  H/O elbow surgery: Right elbow surgery       MEDICATIONS  (STANDING):  aspirin enteric coated 81 milliGRAM(s) Oral daily  atorvastatin 80 milliGRAM(s) Oral at bedtime  cefuroxime  IVPB 1500 milliGRAM(s) IV Intermittent once  chlorhexidine 0.12% Liquid 30 milliLiter(s) Swish and Spit once  chlorhexidine 4% Liquid 1 Application(s) Topical once  heparin  Injectable 5000 Unit(s) SubCutaneous every 8 hours  hydrALAZINE 100 milliGRAM(s) Oral every 8 hours  influenza   Vaccine 0.5 milliLiter(s) IntraMuscular once  insulin lispro (HumaLOG) corrective regimen sliding scale   SubCutaneous three times a day before meals  insulin lispro (HumaLOG) corrective regimen sliding scale   SubCutaneous at bedtime  metoprolol tartrate 25 milliGRAM(s) Oral two times a day  nicotine -  14 mG/24Hr(s) Patch 1 patch Transdermal daily      Vital Signs Last 24 Hrs  T(C): 36.4 (19 @ 04:29), Max: 36.8 (19 @ 12:35)  T(F): 97.5 (19 @ 04:29), Max: 98.3 (19 @ 21:05)  HR: 79 (19 @ 04:29) (78 - 86)  BP: 121/65 (19 @ 04:29) (121/65 - 153/77)  RR: 18 (19 @ 04:29) (18 - 19)  SpO2: 96% (19 @ 04:29) (96% - 100%)              Height (cm): 170   Weight (kg): 72.6   BMI (kg/m2): 25.1  (13 Sep 2019 04:45)      Labs:                        15.1   11.0  )-----------( 134      ( 18 Sep 2019 06:13 )             46.1     136  |  98  |  21  ----------------------------<  179<H>  3.9   |  25  |  1.09    AST  20  /  ALT  35  /  AlkPhos  97      PT/INR/PTT - ( 18 Sep 2019 06:13 )   PT: 11.4 sec;   INR: 1.00 ratio   PTT:39.2 sec    ABO Interpretation: B ( @ 07:23)    Thyroid Panel:  @ 22:24  1.46/8.2/106    MRSA: negative / MSSA: negative    Urinalysis Basic - ( 16 Sep 2019 15:35 )  Color: Yellow / Appearance: Clear / S.021 / pH: x  Gluc: x / Ketone: Negative  / Bili: Negative / Urobili: Negative   Blood: x / Protein: 30 mg/dL / Nitrite: Negative   Leuk Esterase: Negative / RBC: 2 /hpf / WBC 0 /HPF   Sq Epi: x / Non Sq Epi: 2 /hpf / Bacteria: Negative    P2Y12: 196  Hemoglobin A1c: 9.2  Pro-BNP: 939    < from: Xray Chest 1 View- PORTABLE-Urgent (19 @ 13:49) >  IMPRESSION:  Clear lungs.       < from: VA Duplex Carotid, Bilat (19 @ 12:59) >  Impression: Mixed plaque in the mid left common carotid artery creating a   hemodynamically significant lesion. The degree of luminal narrowing here   is estimated at greater than 50% by diameter.    Calcified plaque in the proximal left internal carotid artery creating a   hemodynamically significant lesion. The degree of luminal narrowing here   is estimated at 50-69% by diameter.    Calcified plaque in the right internal carotid artery without duplex   evidence of hemodynamically significant stenosis.       PFT's: completed       < from: Transthoracic Echocardiogram (19 @ 19:54) >  Observations:  Mitral Valve: Mitral annular calcification, otherwise  normal mitral valve. Minimal mitral regurgitation.  Aortic Valve/Aorta: Calcified trileaflet aortic valve with  normal opening. Peak transaortic valve gradient equals 5 mm  Hg. Minimal aortic regurgitation.  Peak left ventricular  outflow tract gradient equals 3 mm Hg.  Aortic Root: 3 cm.  Left Atrium: Normal left atrium.  LA volume index = 15  cc/m2.  Left Ventricle: Endocardium not well visualized; grossly  normal left ventricular systolic function. Increased  relative wall thickness with normal left ventricular mass  index, consistent with concentric left ventricular  remodeling. Reversal of the E-A  waves of the mitral inflow  pattern is consistent with diastolic LV dysfunction.  Right Heart: Normal right atrium. The right ventricle is  not well visualized; grossly normal right ventricular  systolic function. Normal tricuspid valve. Minimal  tricuspid regurgitation. Pulmonic valve not well  visualized. Minimal pulmonic regurgitation.  Pericardium/Pleura: Normal pericardium with no pericardial  effusion.  Hemodynamic: Estimated right atrial pressure is 8 mm Hg.  Estimated right ventricular systolic pressure equals 11 mm  Hg, assuming right atrial pressure equals 8 mm Hg,  consistent with normal pulmonary pressures.    < from: Cardiac Cath Lab - Adult (19 @ 16:18) >  CORONARY VESSELS: The coronary circulation is right dominant.  LM:   --  LM: Normal.  LAD:   --  Proximal LAD: There was a 95 % stenosis.  --  Distal LAD: Angiography showed severe atherosclerosis.  --  D1: There was a 90 % stenosis.  CX:   --  Proximal circumflex: There was a 90 % stenosis.  --  OM1: There was a 95 % stenosis.  RCA:   --  Proximal RCA: There was a 100 % stenosis.      < from: CT Head No Cont (19 @ 22:45) >  IMPRESSION:  Volume loss with microvascular disease, no acute hemorrhage or midline   shift there are remote and age indeterminate lacunar infarcts. If   symptoms persist consider follow-up head CT or MR if no contraindications.    Contrast present from CT angiogram. Tortuous vessels, likely hypertensive   related, with vascular calcification of carotid siphons. Retention cysts   and polyps throughout the paranasal sinuses, correlate with underlying   sinonasal polyposis. Right mastoid tip opacification with air-fluid level.      PHYSICAL EXAM:  Neuro: A&Ox3, NAD  Pulm: B/L BS CTA  CV: RRR,+S1S2  Abd: Soft, NT/ND +BSX4Q  Ext: B/L LE no edema, +PP, no calf tenderness      Pt has AICD/PPM [ ] Yes  [x ] No        Pre-op Beta Blocker ordered within 24 hrs of surgery (CABG ONLY)?  [x ] Yes  [ ] No  If not, Why?  Type & Cross  [X ] Yes  [ ] No  NPO after Midnight [x ] Yes  [ ] No  Pre-op ABX ordered, to be taped on chart:  [ x] Yes  [ ] No     Hibiclens/Peridex ordered [x ] Yes  [ ] No  Intraop on Hold: PRBCs, CXR, MIGUEL ANGEL [x ]   Consent obtained  [x ] Yes  [ ] No

## 2019-09-18 NOTE — CONSULT NOTE ADULT - PROBLEM SELECTOR RECOMMENDATION 9
Will start patient on basal bolus insulin: Lantus 15u at bedtime, Humalog 7u before each meal, as well as coverage scale. Will continue monitoring FS, log, and FU.  Patient counseled for compliance with consistent low carb diet.

## 2019-09-18 NOTE — CONSULT NOTE ADULT - PROBLEM SELECTOR RECOMMENDATION 2
CABG scheduled 9/19. On medications, no chest pain, stable, monitored and followed up by primary cardiothoracic team/cardiology team

## 2019-09-18 NOTE — PROGRESS NOTE ADULT - PROBLEM SELECTOR PLAN 1
Continue asa 81, metoprolol 25 BID, atorvastatin 80 HS  Continue pre-op cardiac surgery workup  P2Y12 196  NPO p MN, Chlorhexidine Shower  Plan for CABG Thursday 9/19 with Dr. Santos

## 2019-09-18 NOTE — PROVIDER CONTACT NOTE (OTHER) - ASSESSMENT
Patient shows RN the blood in the sink where he spit out into post brushing his teeth. A pinpoint amount of Bright Red Blood noted in the sink. No bleeding noted in patient's mouth. Patient is Alert and Oriented times Four. Patient denies chest pain, shortness of breath, dizziness and lightheadedness. Patient is free from signs and symptoms of bleeding. No bleeding noted. Patient's Vital Signs: Patient shows RN the blood in the sink where he spit out into post brushing his teeth. A pinpoint amount of Bright Red Blood noted in the sink. No bleeding noted in patient's mouth. Patient is Alert and Oriented times Four. Patient denies chest pain, shortness of breath, dizziness and lightheadedness. Patient is free from signs and symptoms of bleeding. No bleeding noted. Patient's Vital Signs: Temperature 97.7 F Oral, Heart Rate 82, Blood Pressure 143/77, Respiratory Rate 18 and O2 Saturation 98% Room Air. Patient's Heart Rhythm is Normal Sinus Rhythm on the cardiac monitor.

## 2019-09-19 ENCOUNTER — APPOINTMENT (OUTPATIENT)
Dept: CARDIOTHORACIC SURGERY | Facility: HOSPITAL | Age: 64
End: 2019-09-19

## 2019-09-19 PROBLEM — I10 ESSENTIAL (PRIMARY) HYPERTENSION: Chronic | Status: ACTIVE | Noted: 2019-09-12

## 2019-09-19 PROBLEM — E11.49 TYPE 2 DIABETES MELLITUS WITH OTHER DIABETIC NEUROLOGICAL COMPLICATION: Chronic | Status: ACTIVE | Noted: 2019-09-12

## 2019-09-19 PROBLEM — Z00.00 ENCOUNTER FOR PREVENTIVE HEALTH EXAMINATION: Status: ACTIVE | Noted: 2019-09-19

## 2019-09-19 LAB
ALBUMIN SERPL ELPH-MCNC: 0.4 G/DL — LOW (ref 3.3–5)
ALP SERPL-CCNC: <15 U/L — LOW (ref 40–120)
ALT FLD-CCNC: 15 U/L — SIGNIFICANT CHANGE UP (ref 10–45)
ANION GAP SERPL CALC-SCNC: 25 MMOL/L — HIGH (ref 5–17)
APTT BLD: 34.3 SEC — SIGNIFICANT CHANGE UP (ref 27.5–36.3)
APTT BLD: 86.3 SEC — HIGH (ref 27.5–36.3)
AST SERPL-CCNC: 52 U/L — HIGH (ref 10–40)
BASE EXCESS BLDV CALC-SCNC: -0.1 MMOL/L — SIGNIFICANT CHANGE UP (ref -2–2)
BASE EXCESS BLDV CALC-SCNC: -0.8 MMOL/L — SIGNIFICANT CHANGE UP (ref -2–2)
BASE EXCESS BLDV CALC-SCNC: -1.3 MMOL/L — SIGNIFICANT CHANGE UP (ref -2–2)
BASE EXCESS BLDV CALC-SCNC: -3.1 MMOL/L — LOW (ref -2–2)
BASE EXCESS BLDV CALC-SCNC: -5 MMOL/L — LOW (ref -2–2)
BASE EXCESS BLDV CALC-SCNC: -7.4 MMOL/L — LOW (ref -2–2)
BASE EXCESS BLDV CALC-SCNC: 2 MMOL/L — SIGNIFICANT CHANGE UP (ref -2–2)
BASE EXCESS BLDV CALC-SCNC: 2.1 MMOL/L — HIGH (ref -2–2)
BASE EXCESS BLDV CALC-SCNC: 4.2 MMOL/L — HIGH (ref -2–2)
BASE EXCESS BLDV CALC-SCNC: 5 MMOL/L — HIGH (ref -2–2)
BASOPHILS # BLD AUTO: 0 K/UL — SIGNIFICANT CHANGE UP (ref 0–0.2)
BASOPHILS # BLD AUTO: 0.1 K/UL — SIGNIFICANT CHANGE UP (ref 0–0.2)
BASOPHILS NFR BLD AUTO: 0.1 % — SIGNIFICANT CHANGE UP (ref 0–2)
BASOPHILS NFR BLD AUTO: 0.9 % — SIGNIFICANT CHANGE UP (ref 0–2)
BILIRUB SERPL-MCNC: 0.7 MG/DL — SIGNIFICANT CHANGE UP (ref 0.2–1.2)
BLOOD GAS VENOUS - CREATININE: SIGNIFICANT CHANGE UP MG/DL (ref 0.5–1.3)
BUN SERPL-MCNC: 14 MG/DL — SIGNIFICANT CHANGE UP (ref 7–23)
CA-I SERPL-SCNC: 0.82 MMOL/L — LOW (ref 1.12–1.3)
CA-I SERPL-SCNC: 0.9 MMOL/L — LOW (ref 1.12–1.3)
CA-I SERPL-SCNC: 0.9 MMOL/L — LOW (ref 1.12–1.3)
CA-I SERPL-SCNC: 0.91 MMOL/L — LOW (ref 1.12–1.3)
CA-I SERPL-SCNC: 0.94 MMOL/L — LOW (ref 1.12–1.3)
CA-I SERPL-SCNC: 0.95 MMOL/L — LOW (ref 1.12–1.3)
CA-I SERPL-SCNC: 0.96 MMOL/L — LOW (ref 1.12–1.3)
CA-I SERPL-SCNC: 0.99 MMOL/L — LOW (ref 1.12–1.3)
CA-I SERPL-SCNC: 0.99 MMOL/L — LOW (ref 1.12–1.3)
CA-I SERPL-SCNC: 1.18 MMOL/L — SIGNIFICANT CHANGE UP (ref 1.12–1.3)
CALCIUM SERPL-MCNC: 5.8 MG/DL — CRITICAL LOW (ref 8.4–10.5)
CHLORIDE BLDV-SCNC: SIGNIFICANT CHANGE UP MMOL/L (ref 96–108)
CHLORIDE SERPL-SCNC: 109 MMOL/L — HIGH (ref 96–108)
CK MB BLD-MCNC: 9.8 % — HIGH (ref 0–3.5)
CK MB CFR SERPL CALC: 38.7 NG/ML — HIGH (ref 0–6.7)
CK SERPL-CCNC: 394 U/L — HIGH (ref 30–200)
CO2 SERPL-SCNC: 15 MMOL/L — LOW (ref 22–31)
CREAT SERPL-MCNC: 1.14 MG/DL — SIGNIFICANT CHANGE UP (ref 0.5–1.3)
EOSINOPHIL # BLD AUTO: 0.1 K/UL — SIGNIFICANT CHANGE UP (ref 0–0.5)
EOSINOPHIL # BLD AUTO: 0.4 K/UL — SIGNIFICANT CHANGE UP (ref 0–0.5)
EOSINOPHIL NFR BLD AUTO: 0.7 % — SIGNIFICANT CHANGE UP (ref 0–6)
EOSINOPHIL NFR BLD AUTO: 3.7 % — SIGNIFICANT CHANGE UP (ref 0–6)
FIBRINOGEN PPP-MCNC: 229 MG/DL — LOW (ref 350–510)
GAS PNL BLDA: SIGNIFICANT CHANGE UP
GAS PNL BLDV: 133 MMOL/L — LOW (ref 135–145)
GAS PNL BLDV: 136 MMOL/L — SIGNIFICANT CHANGE UP (ref 135–145)
GAS PNL BLDV: 136 MMOL/L — SIGNIFICANT CHANGE UP (ref 135–145)
GAS PNL BLDV: 139 MMOL/L — SIGNIFICANT CHANGE UP (ref 135–145)
GAS PNL BLDV: 139 MMOL/L — SIGNIFICANT CHANGE UP (ref 135–145)
GAS PNL BLDV: 141 MMOL/L — SIGNIFICANT CHANGE UP (ref 135–145)
GAS PNL BLDV: 143 MMOL/L — SIGNIFICANT CHANGE UP (ref 135–145)
GAS PNL BLDV: 144 MMOL/L — SIGNIFICANT CHANGE UP (ref 135–145)
GAS PNL BLDV: SIGNIFICANT CHANGE UP
GLUCOSE BLDV-MCNC: 121 MG/DL — HIGH (ref 70–99)
GLUCOSE BLDV-MCNC: 133 MG/DL — HIGH (ref 70–99)
GLUCOSE BLDV-MCNC: 137 MG/DL — HIGH (ref 70–99)
GLUCOSE BLDV-MCNC: 150 MG/DL — HIGH (ref 70–99)
GLUCOSE BLDV-MCNC: 153 MG/DL — HIGH (ref 70–99)
GLUCOSE BLDV-MCNC: 157 MG/DL — HIGH (ref 70–99)
GLUCOSE BLDV-MCNC: 163 MG/DL — HIGH (ref 70–99)
GLUCOSE BLDV-MCNC: 174 MG/DL — HIGH (ref 70–99)
GLUCOSE BLDV-MCNC: 183 MG/DL — HIGH (ref 70–99)
GLUCOSE BLDV-MCNC: 229 MG/DL — HIGH (ref 70–99)
GLUCOSE SERPL-MCNC: 154 MG/DL — HIGH (ref 70–99)
HCO3 BLDV-SCNC: 20 MMOL/L — LOW (ref 21–29)
HCO3 BLDV-SCNC: 21 MMOL/L — SIGNIFICANT CHANGE UP (ref 21–29)
HCO3 BLDV-SCNC: 23 MMOL/L — SIGNIFICANT CHANGE UP (ref 21–29)
HCO3 BLDV-SCNC: 24 MMOL/L — SIGNIFICANT CHANGE UP (ref 21–29)
HCO3 BLDV-SCNC: 27 MMOL/L — SIGNIFICANT CHANGE UP (ref 21–29)
HCO3 BLDV-SCNC: 29 MMOL/L — SIGNIFICANT CHANGE UP (ref 21–29)
HCO3 BLDV-SCNC: 29 MMOL/L — SIGNIFICANT CHANGE UP (ref 21–29)
HCO3 BLDV-SCNC: 30 MMOL/L — HIGH (ref 21–29)
HCT VFR BLD CALC: 30.9 % — LOW (ref 39–50)
HCT VFR BLD CALC: 44 % — SIGNIFICANT CHANGE UP (ref 39–50)
HCT VFR BLDA CALC: 20 % — CRITICAL LOW (ref 39–50)
HCT VFR BLDA CALC: 21 % — CRITICAL LOW (ref 39–50)
HCT VFR BLDA CALC: 22 % — CRITICAL LOW (ref 39–50)
HCT VFR BLDA CALC: 23 % — LOW (ref 39–50)
HCT VFR BLDA CALC: 24 % — LOW (ref 39–50)
HCT VFR BLDA CALC: 24 % — LOW (ref 39–50)
HCT VFR BLDA CALC: 25 % — LOW (ref 39–50)
HCT VFR BLDA CALC: 25 % — LOW (ref 39–50)
HCT VFR BLDA CALC: 26 % — LOW (ref 39–50)
HCT VFR BLDA CALC: 27 % — LOW (ref 39–50)
HGB BLD CALC-MCNC: 6.3 G/DL — CRITICAL LOW (ref 13–17)
HGB BLD CALC-MCNC: 6.8 G/DL — CRITICAL LOW (ref 13–17)
HGB BLD CALC-MCNC: 7.2 G/DL — LOW (ref 13–17)
HGB BLD CALC-MCNC: 7.5 G/DL — LOW (ref 13–17)
HGB BLD CALC-MCNC: 7.5 G/DL — LOW (ref 13–17)
HGB BLD CALC-MCNC: 7.8 G/DL — LOW (ref 13–17)
HGB BLD CALC-MCNC: 7.9 G/DL — LOW (ref 13–17)
HGB BLD CALC-MCNC: 8.2 G/DL — LOW (ref 13–17)
HGB BLD CALC-MCNC: 8.2 G/DL — LOW (ref 13–17)
HGB BLD CALC-MCNC: 8.7 G/DL — LOW (ref 13–17)
HGB BLD-MCNC: 10.2 G/DL — LOW (ref 13–17)
HGB BLD-MCNC: 14.9 G/DL — SIGNIFICANT CHANGE UP (ref 13–17)
HOROWITZ INDEX BLDV+IHG-RTO: 0 — SIGNIFICANT CHANGE UP
INR BLD: 1.03 RATIO — SIGNIFICANT CHANGE UP (ref 0.88–1.16)
INR BLD: 3.09 RATIO — HIGH (ref 0.88–1.16)
LACTATE BLDV-MCNC: 1 MMOL/L — SIGNIFICANT CHANGE UP (ref 0.7–2)
LACTATE BLDV-MCNC: 1.4 MMOL/L — SIGNIFICANT CHANGE UP (ref 0.7–2)
LACTATE BLDV-MCNC: 10.6 MMOL/L — CRITICAL HIGH (ref 0.7–2)
LACTATE BLDV-MCNC: 13.3 MMOL/L — CRITICAL HIGH (ref 0.7–2)
LACTATE BLDV-MCNC: 13.6 MMOL/L — CRITICAL HIGH (ref 0.7–2)
LACTATE BLDV-MCNC: 6 MMOL/L — CRITICAL HIGH (ref 0.7–2)
LACTATE BLDV-MCNC: 8.8 MMOL/L — CRITICAL HIGH (ref 0.7–2)
LACTATE BLDV-MCNC: 9.8 MMOL/L — CRITICAL HIGH (ref 0.7–2)
LYMPHOCYTES # BLD AUTO: 1.9 K/UL — SIGNIFICANT CHANGE UP (ref 1–3.3)
LYMPHOCYTES # BLD AUTO: 2.7 K/UL — SIGNIFICANT CHANGE UP (ref 1–3.3)
LYMPHOCYTES # BLD AUTO: 22.9 % — SIGNIFICANT CHANGE UP (ref 13–44)
LYMPHOCYTES # BLD AUTO: 24.5 % — SIGNIFICANT CHANGE UP (ref 13–44)
MAGNESIUM SERPL-MCNC: 1.8 MG/DL — SIGNIFICANT CHANGE UP (ref 1.6–2.6)
MAGNESIUM SERPL-MCNC: 1.9 MG/DL — SIGNIFICANT CHANGE UP (ref 1.6–2.6)
MCHC RBC-ENTMCNC: 30.5 PG — SIGNIFICANT CHANGE UP (ref 27–34)
MCHC RBC-ENTMCNC: 31 PG — SIGNIFICANT CHANGE UP (ref 27–34)
MCHC RBC-ENTMCNC: 33.1 GM/DL — SIGNIFICANT CHANGE UP (ref 32–36)
MCHC RBC-ENTMCNC: 33.8 GM/DL — SIGNIFICANT CHANGE UP (ref 32–36)
MCV RBC AUTO: 91.6 FL — SIGNIFICANT CHANGE UP (ref 80–100)
MCV RBC AUTO: 92.2 FL — SIGNIFICANT CHANGE UP (ref 80–100)
MONOCYTES # BLD AUTO: 0.3 K/UL — SIGNIFICANT CHANGE UP (ref 0–0.9)
MONOCYTES # BLD AUTO: 1.1 K/UL — HIGH (ref 0–0.9)
MONOCYTES NFR BLD AUTO: 10 % — SIGNIFICANT CHANGE UP (ref 2–14)
MONOCYTES NFR BLD AUTO: 3.8 % — SIGNIFICANT CHANGE UP (ref 2–14)
NEUTROPHILS # BLD AUTO: 6 K/UL — SIGNIFICANT CHANGE UP (ref 1.8–7.4)
NEUTROPHILS # BLD AUTO: 6.6 K/UL — SIGNIFICANT CHANGE UP (ref 1.8–7.4)
NEUTROPHILS NFR BLD AUTO: 60.8 % — SIGNIFICANT CHANGE UP (ref 43–77)
NEUTROPHILS NFR BLD AUTO: 72.5 % — SIGNIFICANT CHANGE UP (ref 43–77)
PCO2 BLDV: 42 MMHG — SIGNIFICANT CHANGE UP (ref 35–50)
PCO2 BLDV: 43 MMHG — SIGNIFICANT CHANGE UP (ref 35–50)
PCO2 BLDV: 47 MMHG — SIGNIFICANT CHANGE UP (ref 35–50)
PCO2 BLDV: 47 MMHG — SIGNIFICANT CHANGE UP (ref 35–50)
PCO2 BLDV: 48 MMHG — SIGNIFICANT CHANGE UP (ref 35–50)
PCO2 BLDV: 52 MMHG — HIGH (ref 35–50)
PCO2 BLDV: 58 MMHG — HIGH (ref 35–50)
PCO2 BLDV: 63 MMHG — HIGH (ref 35–50)
PH BLDV: 7.17 — CRITICAL LOW (ref 7.35–7.45)
PH BLDV: 7.26 — LOW (ref 7.35–7.45)
PH BLDV: 7.27 — LOW (ref 7.35–7.45)
PH BLDV: 7.28 — LOW (ref 7.35–7.45)
PH BLDV: 7.32 — LOW (ref 7.35–7.45)
PH BLDV: 7.36 — SIGNIFICANT CHANGE UP (ref 7.35–7.45)
PH BLDV: 7.38 — SIGNIFICANT CHANGE UP (ref 7.35–7.45)
PH BLDV: 7.38 — SIGNIFICANT CHANGE UP (ref 7.35–7.45)
PH BLDV: 7.41 — SIGNIFICANT CHANGE UP (ref 7.35–7.45)
PH BLDV: 7.41 — SIGNIFICANT CHANGE UP (ref 7.35–7.45)
PHOSPHATE SERPL-MCNC: 2.4 MG/DL — LOW (ref 2.5–4.5)
PHOSPHATE SERPL-MCNC: 3.8 MG/DL — SIGNIFICANT CHANGE UP (ref 2.5–4.5)
PLATELET # BLD AUTO: 126 K/UL — LOW (ref 150–400)
PLATELET # BLD AUTO: 6 K/UL — CRITICAL LOW (ref 150–400)
PO2 BLDV: 41 MMHG — SIGNIFICANT CHANGE UP (ref 25–45)
PO2 BLDV: 45 MMHG — SIGNIFICANT CHANGE UP (ref 25–45)
PO2 BLDV: 47 MMHG — HIGH (ref 25–45)
PO2 BLDV: 47 MMHG — HIGH (ref 25–45)
PO2 BLDV: 50 MMHG — HIGH (ref 25–45)
PO2 BLDV: 53 MMHG — HIGH (ref 25–45)
PO2 BLDV: 54 MMHG — HIGH (ref 25–45)
PO2 BLDV: 55 MMHG — HIGH (ref 25–45)
PO2 BLDV: 59 MMHG — HIGH (ref 25–45)
PO2 BLDV: 60 MMHG — HIGH (ref 25–45)
POTASSIUM BLDV-SCNC: 3.3 MMOL/L — LOW (ref 3.5–5)
POTASSIUM BLDV-SCNC: 3.5 MMOL/L — SIGNIFICANT CHANGE UP (ref 3.5–5)
POTASSIUM BLDV-SCNC: 3.6 MMOL/L — SIGNIFICANT CHANGE UP (ref 3.5–5)
POTASSIUM BLDV-SCNC: 3.7 MMOL/L — SIGNIFICANT CHANGE UP (ref 3.5–5)
POTASSIUM BLDV-SCNC: 3.8 MMOL/L — SIGNIFICANT CHANGE UP (ref 3.5–5)
POTASSIUM BLDV-SCNC: 3.9 MMOL/L — SIGNIFICANT CHANGE UP (ref 3.5–5)
POTASSIUM BLDV-SCNC: 4.1 MMOL/L — SIGNIFICANT CHANGE UP (ref 3.5–5)
POTASSIUM BLDV-SCNC: 4.1 MMOL/L — SIGNIFICANT CHANGE UP (ref 3.5–5)
POTASSIUM BLDV-SCNC: 5.2 MMOL/L — HIGH (ref 3.5–5)
POTASSIUM BLDV-SCNC: 5.3 MMOL/L — HIGH (ref 3.5–5)
POTASSIUM SERPL-MCNC: 3.6 MMOL/L — SIGNIFICANT CHANGE UP (ref 3.5–5.3)
POTASSIUM SERPL-SCNC: 3.6 MMOL/L — SIGNIFICANT CHANGE UP (ref 3.5–5.3)
PROT SERPL-MCNC: 1.1 G/DL — LOW (ref 6–8.3)
PROTHROM AB SERPL-ACNC: 11.9 SEC — SIGNIFICANT CHANGE UP (ref 10–12.9)
PROTHROM AB SERPL-ACNC: 36.5 SEC — HIGH (ref 10–12.9)
RBC # BLD: 3.35 M/UL — LOW (ref 4.2–5.8)
RBC # BLD: 4.8 M/UL — SIGNIFICANT CHANGE UP (ref 4.2–5.8)
RBC # FLD: 12.8 % — SIGNIFICANT CHANGE UP (ref 10.3–14.5)
RBC # FLD: 12.9 % — SIGNIFICANT CHANGE UP (ref 10.3–14.5)
SAO2 % BLDV: 62 % — LOW (ref 67–88)
SAO2 % BLDV: 70 % — SIGNIFICANT CHANGE UP (ref 67–88)
SAO2 % BLDV: 76 % — SIGNIFICANT CHANGE UP (ref 67–88)
SAO2 % BLDV: 77 % — SIGNIFICANT CHANGE UP (ref 67–88)
SAO2 % BLDV: 79 % — SIGNIFICANT CHANGE UP (ref 67–88)
SAO2 % BLDV: 82 % — SIGNIFICANT CHANGE UP (ref 67–88)
SAO2 % BLDV: 82 % — SIGNIFICANT CHANGE UP (ref 67–88)
SAO2 % BLDV: 83 % — SIGNIFICANT CHANGE UP (ref 67–88)
SAO2 % BLDV: 88 % — SIGNIFICANT CHANGE UP (ref 67–88)
SAO2 % BLDV: 88 % — SIGNIFICANT CHANGE UP (ref 67–88)
SODIUM SERPL-SCNC: 149 MMOL/L — HIGH (ref 135–145)
TROPONIN T, HIGH SENSITIVITY RESULT: 1764 NG/L — HIGH (ref 0–51)
WBC # BLD: 10.8 K/UL — HIGH (ref 3.8–10.5)
WBC # BLD: 8.3 K/UL — SIGNIFICANT CHANGE UP (ref 3.8–10.5)
WBC # FLD AUTO: 10.8 K/UL — HIGH (ref 3.8–10.5)
WBC # FLD AUTO: 8.3 K/UL — SIGNIFICANT CHANGE UP (ref 3.8–10.5)

## 2019-09-19 PROCEDURE — 99291 CRITICAL CARE FIRST HOUR: CPT

## 2019-09-19 PROCEDURE — 33970 AORTIC CIRCULATION ASSIST: CPT

## 2019-09-19 PROCEDURE — 33947 ECMO/ECLS INITIATION ARTERY: CPT

## 2019-09-19 PROCEDURE — 33519 CABG ARTERY-VEIN THREE: CPT

## 2019-09-19 PROCEDURE — 71045 X-RAY EXAM CHEST 1 VIEW: CPT | Mod: 26

## 2019-09-19 PROCEDURE — 99232 SBSQ HOSP IP/OBS MODERATE 35: CPT | Mod: GC

## 2019-09-19 PROCEDURE — 33533 CABG ARTERIAL SINGLE: CPT

## 2019-09-19 RX ORDER — OXYCODONE AND ACETAMINOPHEN 5; 325 MG/1; MG/1
1 TABLET ORAL EVERY 4 HOURS
Refills: 0 | Status: DISCONTINUED | OUTPATIENT
Start: 2019-09-19 | End: 2019-09-19

## 2019-09-19 RX ORDER — MAGNESIUM SULFATE 500 MG/ML
2 VIAL (ML) INJECTION ONCE
Refills: 0 | Status: COMPLETED | OUTPATIENT
Start: 2019-09-19 | End: 2019-09-19

## 2019-09-19 RX ORDER — PROPOFOL 10 MG/ML
30 INJECTION, EMULSION INTRAVENOUS
Qty: 500 | Refills: 0 | Status: DISCONTINUED | OUTPATIENT
Start: 2019-09-19 | End: 2019-09-20

## 2019-09-19 RX ORDER — CEFUROXIME AXETIL 250 MG
1500 TABLET ORAL EVERY 8 HOURS
Refills: 0 | Status: DISCONTINUED | OUTPATIENT
Start: 2019-09-19 | End: 2019-09-21

## 2019-09-19 RX ORDER — CHLORHEXIDINE GLUCONATE 213 G/1000ML
5 SOLUTION TOPICAL EVERY 4 HOURS
Refills: 0 | Status: DISCONTINUED | OUTPATIENT
Start: 2019-09-19 | End: 2019-09-19

## 2019-09-19 RX ORDER — CHLORHEXIDINE GLUCONATE 213 G/1000ML
1 SOLUTION TOPICAL
Refills: 0 | Status: DISCONTINUED | OUTPATIENT
Start: 2019-09-19 | End: 2019-09-21

## 2019-09-19 RX ORDER — CALCIUM GLUCONATE 100 MG/ML
1 VIAL (ML) INTRAVENOUS ONCE
Refills: 0 | Status: COMPLETED | OUTPATIENT
Start: 2019-09-19 | End: 2019-09-19

## 2019-09-19 RX ORDER — SODIUM BICARBONATE 1 MEQ/ML
50 SYRINGE (ML) INTRAVENOUS ONCE
Refills: 0 | Status: COMPLETED | OUTPATIENT
Start: 2019-09-19 | End: 2019-09-19

## 2019-09-19 RX ORDER — OXYCODONE AND ACETAMINOPHEN 5; 325 MG/1; MG/1
2 TABLET ORAL EVERY 6 HOURS
Refills: 0 | Status: DISCONTINUED | OUTPATIENT
Start: 2019-09-19 | End: 2019-09-19

## 2019-09-19 RX ORDER — DEXTROSE 50 % IN WATER 50 %
25 SYRINGE (ML) INTRAVENOUS
Refills: 0 | Status: DISCONTINUED | OUTPATIENT
Start: 2019-09-19 | End: 2019-09-19

## 2019-09-19 RX ORDER — DEXTROSE 50 % IN WATER 50 %
50 SYRINGE (ML) INTRAVENOUS
Refills: 0 | Status: DISCONTINUED | OUTPATIENT
Start: 2019-09-19 | End: 2019-09-19

## 2019-09-19 RX ORDER — METOCLOPRAMIDE HCL 10 MG
10 TABLET ORAL EVERY 8 HOURS
Refills: 0 | Status: COMPLETED | OUTPATIENT
Start: 2019-09-19 | End: 2019-09-21

## 2019-09-19 RX ORDER — ALBUMIN HUMAN 25 %
250 VIAL (ML) INTRAVENOUS ONCE
Refills: 0 | Status: COMPLETED | OUTPATIENT
Start: 2019-09-19 | End: 2019-09-19

## 2019-09-19 RX ORDER — CHLORHEXIDINE GLUCONATE 213 G/1000ML
5 SOLUTION TOPICAL EVERY 4 HOURS
Refills: 0 | Status: DISCONTINUED | OUTPATIENT
Start: 2019-09-19 | End: 2019-09-21

## 2019-09-19 RX ORDER — POTASSIUM CHLORIDE 20 MEQ
10 PACKET (EA) ORAL
Refills: 0 | Status: DISCONTINUED | OUTPATIENT
Start: 2019-09-19 | End: 2019-09-20

## 2019-09-19 RX ORDER — POTASSIUM CHLORIDE 20 MEQ
10 PACKET (EA) ORAL
Refills: 0 | Status: COMPLETED | OUTPATIENT
Start: 2019-09-19 | End: 2019-09-19

## 2019-09-19 RX ORDER — ASPIRIN/CALCIUM CARB/MAGNESIUM 324 MG
81 TABLET ORAL DAILY
Refills: 0 | Status: DISCONTINUED | OUTPATIENT
Start: 2019-09-19 | End: 2019-09-19

## 2019-09-19 RX ORDER — NOREPINEPHRINE BITARTRATE/D5W 8 MG/250ML
0.05 PLASTIC BAG, INJECTION (ML) INTRAVENOUS
Qty: 8 | Refills: 0 | Status: DISCONTINUED | OUTPATIENT
Start: 2019-09-19 | End: 2019-09-21

## 2019-09-19 RX ORDER — PANTOPRAZOLE SODIUM 20 MG/1
40 TABLET, DELAYED RELEASE ORAL DAILY
Refills: 0 | Status: DISCONTINUED | OUTPATIENT
Start: 2019-09-19 | End: 2019-09-19

## 2019-09-19 RX ORDER — INSULIN HUMAN 100 [IU]/ML
2 INJECTION, SOLUTION SUBCUTANEOUS
Qty: 100 | Refills: 0 | Status: DISCONTINUED | OUTPATIENT
Start: 2019-09-19 | End: 2019-09-21

## 2019-09-19 RX ORDER — METOCLOPRAMIDE HCL 10 MG
10 TABLET ORAL EVERY 8 HOURS
Refills: 0 | Status: DISCONTINUED | OUTPATIENT
Start: 2019-09-19 | End: 2019-09-19

## 2019-09-19 RX ORDER — DOCUSATE SODIUM 100 MG
100 CAPSULE ORAL THREE TIMES A DAY
Refills: 0 | Status: DISCONTINUED | OUTPATIENT
Start: 2019-09-19 | End: 2019-09-19

## 2019-09-19 RX ORDER — SODIUM CHLORIDE 9 MG/ML
1000 INJECTION INTRAMUSCULAR; INTRAVENOUS; SUBCUTANEOUS
Refills: 0 | Status: DISCONTINUED | OUTPATIENT
Start: 2019-09-19 | End: 2019-09-19

## 2019-09-19 RX ORDER — POLYETHYLENE GLYCOL 3350 17 G/17G
17 POWDER, FOR SOLUTION ORAL DAILY
Refills: 0 | Status: DISCONTINUED | OUTPATIENT
Start: 2019-09-19 | End: 2019-09-20

## 2019-09-19 RX ORDER — ASPIRIN/CALCIUM CARB/MAGNESIUM 324 MG
300 TABLET ORAL ONCE
Refills: 0 | Status: DISCONTINUED | OUTPATIENT
Start: 2019-09-19 | End: 2019-09-19

## 2019-09-19 RX ORDER — DOBUTAMINE HCL 250MG/20ML
5 VIAL (ML) INTRAVENOUS
Qty: 500 | Refills: 0 | Status: DISCONTINUED | OUTPATIENT
Start: 2019-09-19 | End: 2019-09-21

## 2019-09-19 RX ORDER — OXYCODONE AND ACETAMINOPHEN 5; 325 MG/1; MG/1
1 TABLET ORAL EVERY 6 HOURS
Refills: 0 | Status: DISCONTINUED | OUTPATIENT
Start: 2019-09-19 | End: 2019-09-19

## 2019-09-19 RX ORDER — CHLORHEXIDINE GLUCONATE 213 G/1000ML
1 SOLUTION TOPICAL
Refills: 0 | Status: DISCONTINUED | OUTPATIENT
Start: 2019-09-19 | End: 2019-09-19

## 2019-09-19 RX ORDER — MEPERIDINE HYDROCHLORIDE 50 MG/ML
25 INJECTION INTRAMUSCULAR; INTRAVENOUS; SUBCUTANEOUS ONCE
Refills: 0 | Status: DISCONTINUED | OUTPATIENT
Start: 2019-09-19 | End: 2019-09-20

## 2019-09-19 RX ORDER — INSULIN HUMAN 100 [IU]/ML
3 INJECTION, SOLUTION SUBCUTANEOUS
Qty: 100 | Refills: 0 | Status: DISCONTINUED | OUTPATIENT
Start: 2019-09-19 | End: 2019-09-19

## 2019-09-19 RX ORDER — NITROGLYCERIN 6.5 MG
5 CAPSULE, EXTENDED RELEASE ORAL
Qty: 50 | Refills: 0 | Status: DISCONTINUED | OUTPATIENT
Start: 2019-09-19 | End: 2019-09-20

## 2019-09-19 RX ORDER — POTASSIUM CHLORIDE 20 MEQ
10 PACKET (EA) ORAL
Refills: 0 | Status: COMPLETED | OUTPATIENT
Start: 2019-09-19 | End: 2019-09-20

## 2019-09-19 RX ORDER — POLYETHYLENE GLYCOL 3350 17 G/17G
17 POWDER, FOR SOLUTION ORAL DAILY
Refills: 0 | Status: DISCONTINUED | OUTPATIENT
Start: 2019-09-19 | End: 2019-09-19

## 2019-09-19 RX ORDER — CHLORHEXIDINE GLUCONATE 213 G/1000ML
15 SOLUTION TOPICAL EVERY 12 HOURS
Refills: 0 | Status: DISCONTINUED | OUTPATIENT
Start: 2019-09-19 | End: 2019-09-19

## 2019-09-19 RX ORDER — CALCIUM GLUCONATE 100 MG/ML
2 VIAL (ML) INTRAVENOUS ONCE
Refills: 0 | Status: COMPLETED | OUTPATIENT
Start: 2019-09-19 | End: 2019-09-19

## 2019-09-19 RX ORDER — DEXMEDETOMIDINE HYDROCHLORIDE IN 0.9% SODIUM CHLORIDE 4 UG/ML
0.5 INJECTION INTRAVENOUS
Qty: 200 | Refills: 0 | Status: DISCONTINUED | OUTPATIENT
Start: 2019-09-19 | End: 2019-09-20

## 2019-09-19 RX ORDER — PANTOPRAZOLE SODIUM 20 MG/1
8 TABLET, DELAYED RELEASE ORAL
Qty: 80 | Refills: 0 | Status: DISCONTINUED | OUTPATIENT
Start: 2019-09-19 | End: 2019-09-21

## 2019-09-19 RX ADMIN — CHLORHEXIDINE GLUCONATE 5 MILLILITER(S): 213 SOLUTION TOPICAL at 21:31

## 2019-09-19 RX ADMIN — Medication 50 MILLIEQUIVALENT(S): at 21:45

## 2019-09-19 RX ADMIN — Medication 125 MILLILITER(S): at 21:10

## 2019-09-19 RX ADMIN — Medication 50 MILLIEQUIVALENT(S): at 21:30

## 2019-09-19 RX ADMIN — Medication 100 MILLIGRAM(S): at 21:30

## 2019-09-19 RX ADMIN — Medication 25 MILLIGRAM(S): at 05:22

## 2019-09-19 RX ADMIN — Medication 50 MILLIEQUIVALENT(S): at 21:00

## 2019-09-19 RX ADMIN — Medication 125 MILLILITER(S): at 21:00

## 2019-09-19 RX ADMIN — Medication 200 GRAM(S): at 20:50

## 2019-09-19 RX ADMIN — Medication 200 GRAM(S): at 22:17

## 2019-09-19 RX ADMIN — Medication 100 MILLIGRAM(S): at 05:22

## 2019-09-19 RX ADMIN — PANTOPRAZOLE SODIUM 40 MILLIGRAM(S): 20 TABLET, DELAYED RELEASE ORAL at 21:31

## 2019-09-19 RX ADMIN — CHLORHEXIDINE GLUCONATE 30 MILLILITER(S): 213 SOLUTION TOPICAL at 05:25

## 2019-09-19 RX ADMIN — Medication 1 PATCH: at 06:00

## 2019-09-19 RX ADMIN — Medication 50 MILLIEQUIVALENT(S): at 22:16

## 2019-09-19 RX ADMIN — Medication 50 GRAM(S): at 22:15

## 2019-09-19 RX ADMIN — CHLORHEXIDINE GLUCONATE 1 APPLICATION(S): 213 SOLUTION TOPICAL at 05:25

## 2019-09-19 NOTE — PROGRESS NOTE ADULT - ASSESSMENT
64 year-old [place] male with history of hypertension, T2DM, and current smoker admitted with unstable angina found on coronary catheterization to have triple vessel disease, pending CABG.

## 2019-09-19 NOTE — PROGRESS NOTE ADULT - PROBLEM SELECTOR PLAN 2
CABG today. On medications,  no chest pain, stable, monitored and followed up by primary cardiothoracic team/cardiology team

## 2019-09-19 NOTE — PROGRESS NOTE ADULT - ATTENDING COMMENTS
-Patient seen on 9/19/19. Case/plan discussed with the intern and resident as reviewed/edited by me above and in any comments below.  -For OR today.

## 2019-09-19 NOTE — PROGRESS NOTE ADULT - PROBLEM SELECTOR PLAN 1
Worsening non-exertional chest pain x 6 days without troponin elevation or change. Triple vessel disease (cath 9/12)  - Cards following  - ASA 81 mg, Atorvastatin 80 mg  - CABG today

## 2019-09-19 NOTE — BRIEF OPERATIVE NOTE - NSICDXBRIEFPROCEDURE_GEN_ALL_CORE_FT
PROCEDURES:  Insertion of cannula for extracorporeal membrane oxygenation (ECMO) by sternotomy approach in patient 6 years of age or older 19-Sep-2019 20:55:19  Shyam Palm  Insertion, intra-aortic balloon pump, percutaneous 19-Sep-2019 20:54:54  Shyam Palm  CABG x 4 19-Sep-2019 20:54:39  Shyam Palm

## 2019-09-19 NOTE — PROGRESS NOTE ADULT - PROBLEM SELECTOR PLAN 1
Once postop patient will be on IV insulin. Will continue monitoring FS, log, and FU.  Can d/c insulin drip and resume basal bolus insulin once eating full meals.

## 2019-09-19 NOTE — PROGRESS NOTE ADULT - ASSESSMENT
Assessment:  DMT2: 64y Male with DM T2 with hyperglycemia, A1C 9.2%, was on oral meds at home, now on insulin, blood sugars running high, had no hypoglycemic episode, NPO for op today.  CAD: CABG today, on medications, no chest pain, stable, monitored.  HTN: Controlled,  on antihypertensive medications.          Brandi Correia MD  Cell: [phone]  Office: [phone] Assessment:  DMT2: 64y Male with DM T2 with hyperglycemia, A1C 9.2%, was on oral meds at home, now on insulin,  blood sugars running high, had no hypoglycemic episode, NPO for op today.  CAD: CABG today, on medications, no chest pain, stable, monitored.  HTN: Controlled,  on antihypertensive medications.          Brandi Correia MD  Cell: [phone]  Office: [phone]

## 2019-09-19 NOTE — PROGRESS NOTE ADULT - SUBJECTIVE AND OBJECTIVE BOX
RUIZ DEMPSEY  MRN#:  [id number]    The patient is a 64y Male with PMH of T2DM, HTN and current 2ppd smoking admitted with unstable angina and found to have multivessel CAD with relatively normal contractility, s/p C4L today complicated by cardiogenic shock and ultimately near cardiac standstill requiring initially IABP and subsequently VA ECMO who was seen, evaluated, & examined with the CTICU staff on rounds and later in the evening with a multidisciplinary care plan formulated & implemented.  All available clinical, laboratory, radiographic, pharmacologic, and electrocardiographic data were reviewed & analyzed.      The patient was in the CTICU in critical condition secondary to persistent cardiopulmonary dysfunction, hemodynamically significant anemia/hypovolemia-shock, hemodynamically significant bradycardia, persistent cardiovascular dysfunction, acidosis, & hyperglycemia.      Respiratory status required full ventilatory support, close monitoring of respiratory rate and breathing pattern, the following of ABG’s with A-line monitoring, continuous pulse oximetry monitoring, an IV Dexmedetomidine infusion & an IV Propofol infusion for support & to evaluate for & prevent further decompensation secondary to persistent cardiopulmonary dysfunction.     Invasive hemodynamic monitoring with a central venous catheter & an A-line were required for the continuous central venous and MAP/BP monitoring to ensure adequate cardiovascular support and to evaluate for & help prevent decompensation while receiving intermittent volume expansion, external epicardial pacing, blood transfusions, blood product transfusions, an IV Levophed drip, an IV Vasopressin drip, an IV Epinephrine drip, an IV Dobutamine drip, an IV Primacor drip, an IV Amiodarone drip, & an IV Neosynephrine drip secondary to hemodynamically significant anemia/anemia due to acute blood loss, hypovolemia-shock, cardiovascular dysfunction, and hemodynamically significant bradycardia.       Metabolic stability, uncontrolled type 2 diabetes-hyperglycemia, & infection prophylaxis required an IV regular Insulin drip & the following of serial glucose levels to help achieve & maintain euglycemia.      Patient required acute postoperative critical care management and I provided 35 minutes of non-continuous care to the patient. I reviewed and assessed all available clinical, laboratory, radiographic, pharmacologic, and electrocardiographic data in order to decide on maintenance or adjustment of medications, ventilator settings/management of respiratory status, and fluid management.  Discussed at length with the CTICU staff and helped coordinate care. RUIZ DEMPSEY  MRN#:  [id number]    The patient is a 64y Male with PMH of T2DM, HTN and current 2ppd smoking admitted with unstable angina and found to have multivessel CAD with relatively normal contractility, s/p C4L today complicated by cardiogenic shock and ultimately near cardiac standstill requiring initially IABP and subsequently VA ECMO who was seen, evaluated, & examined with the CTICU staff on rounds and later in the evening with a multidisciplinary care plan formulated & implemented.  All available clinical, laboratory, radiographic, pharmacologic, and electrocardiographic data were reviewed & analyzed.      The patient was in the CTICU in critical condition secondary to persistent cardiopulmonary dysfunction, hemodynamically significant anemia/hypovolemia-shock, post operative hemorrhage, acidosis, & hyperglycemia.      Respiratory status required full ventilatory support as well as VA ECMO, close monitoring of respiratory rate and breathing pattern, the following of ABG’s with A-line monitoring, continuous pulse oximetry monitoring, an IV Propofol infusion for support & to evaluate for & prevent further decompensation secondary to cardiogenic shock.    Invasive hemodynamic monitoring with a central venous catheter & an A-line were required for the continuous central venous and MAP/BP monitoring to ensure adequate cardiovascular support and to evaluate for & help prevent decompensation while receiving intermittent volume expansion, blood transfusions, blood product transfusions, an IV Levophed drip, an IV Dobutamine drip, VA ECMO and intra aortic balloon counterpulsation for cardiac decompresson secondary to post operative hemorrhage and hemodynamically significant anemia, consumptive coagulopathy, hypovolemic shock, cardiogenic shock, and lactic acidosis. Close monitoring of peripheral perfusion due to severe PVD and atherosclerotic aorta with loss of pedal and posterior tibial pulses.    Metabolic stability, uncontrolled type 2 diabetes-hyperglycemia, & infection prophylaxis required an IV regular Insulin drip & the following of serial glucose levels to help achieve & maintain euglycemia.      Patient required acute postoperative critical care management and I provided 45 minutes of non-continuous care to the patient. I reviewed and assessed all available clinical, laboratory, radiographic, pharmacologic, and electrocardiographic data in order to decide on maintenance or adjustment of medications, ventilator settings and management of respiratory status, and fluid management.  Discussed at length with the CTICU staff and helped coordinate care. RUIZ DEMPSEY  MRN#:  [id number]    The patient is a 64y Male with PMH of T2DM, HTN and current 2ppd smoking admitted with unstable angina and found to have multivessel CAD with relatively normal contractility, s/p C4L today complicated by cardiogenic shock and ultimately near cardiac standstill requiring initially IABP and subsequently VA ECMO who was seen, evaluated, & examined with the CTICU staff on admission and later in the evening with a multidisciplinary care plan formulated & implemented.  All available clinical, laboratory, radiographic, pharmacologic, and electrocardiographic data were reviewed & analyzed.      The patient was in the CTICU in critical condition secondary to persistent cardiopulmonary dysfunction, hemodynamically significant anemia/hypovolemia-shock, post operative hemorrhage, acidosis, & hyperglycemia.      Respiratory status required full ventilatory support as well as VA ECMO, close monitoring of respiratory rate and breathing pattern, the following of ABG’s with A-line monitoring, continuous pulse oximetry monitoring, an IV Propofol infusion for support & to evaluate for & prevent further decompensation secondary to cardiogenic shock.    Invasive hemodynamic monitoring with a central venous catheter & an A-line were required for the continuous central venous and MAP/BP monitoring to ensure adequate cardiovascular support and to evaluate for & help prevent decompensation while receiving intermittent volume expansion, blood transfusions, blood product transfusions, an IV Levophed drip, an IV Dobutamine drip, VA ECMO and intra aortic balloon counterpulsation for cardiac decompresson secondary to post operative hemorrhage and hemodynamically significant anemia, consumptive coagulopathy, hypovolemic shock, cardiogenic shock, and lactic acidosis. Close monitoring of peripheral perfusion due to severe PVD and atherosclerotic aorta with loss of pedal and posterior tibial pulses.    Metabolic stability, uncontrolled type 2 diabetes-hyperglycemia, & infection prophylaxis required an IV regular Insulin drip & the following of serial glucose levels to help achieve & maintain euglycemia.      Patient required acute postoperative critical care management and I provided 45 minutes of non-continuous care to the patient. I reviewed and assessed all available clinical, laboratory, radiographic, pharmacologic, and electrocardiographic data in order to decide on maintenance or adjustment of medications, ventilator settings and management of respiratory status, and fluid management.  Discussed at length with the CTICU staff and helped coordinate care.

## 2019-09-19 NOTE — BRIEF OPERATIVE NOTE - NSICDXBRIEFPOSTOP_GEN_ALL_CORE_FT
POST-OP DIAGNOSIS:  Cardiogenic shock 19-Sep-2019 20:55:54  Shyam Palm  CAD, multiple vessel 19-Sep-2019 20:55:40  Shyam Palm

## 2019-09-19 NOTE — PROGRESS NOTE ADULT - SUBJECTIVE AND OBJECTIVE BOX
Chief complaint  Patient is a 64y old  Male who presents with a chief complaint of Chest Pain (19 Sep 2019 06:54)   Review of systems  Patient in bed, looks comfortable, no fever, no hypoglycemia.    Labs and Fingersticks  CAPILLARY BLOOD GLUCOSE      POCT Blood Glucose.: 179 mg/dL (19 Sep 2019 05:49)  POCT Blood Glucose.: 139 mg/dL (19 Sep 2019 00:17)  POCT Blood Glucose.: 165 mg/dL (18 Sep 2019 22:28)  POCT Blood Glucose.: 144 mg/dL (18 Sep 2019 21:01)  POCT Blood Glucose.: 139 mg/dL (18 Sep 2019 17:49)      Anion Gap, Serum: 13 (09-19 @ 04:57)  Anion Gap, Serum: 13 (09-18 @ 06:14)      Calcium, Total Serum: 10.0 (09-19 @ 04:57)  Calcium, Total Serum: 9.9 (09-18 @ 06:14)  Albumin, Serum: 3.9 (09-19 @ 04:57)  Albumin, Serum: 4.0 (09-18 @ 06:14)    Alanine Aminotransferase (ALT/SGPT): 30 (09-19 @ 04:57)  Alanine Aminotransferase (ALT/SGPT): 35 (09-18 @ 06:14)  Alkaline Phosphatase, Serum: 87 (09-19 @ 04:57)  Alkaline Phosphatase, Serum: 97 (09-18 @ 06:14)  Aspartate Aminotransferase (AST/SGOT): 16 (09-19 @ 04:57)  Aspartate Aminotransferase (AST/SGOT): 20 (09-18 @ 06:14)        09-19    137  |  100  |  22  ----------------------------<  188<H>  3.7   |  24  |  0.98    Ca    10.0      19 Sep 2019 04:57  Phos  3.8     09-19  Mg     1.8     09-19    TPro  6.8  /  Alb  3.9  /  TBili  0.8  /  DBili  x   /  AST  16  /  ALT  30  /  AlkPhos  87  09-19                        14.9   10.8  )-----------( 126      ( 19 Sep 2019 05:00 )             44.0     Medications  MEDICATIONS  (STANDING):  influenza   Vaccine 0.5 milliLiter(s) IntraMuscular once      Physical Exam  General: Patient comfortable in bed  Vital Signs Last 12 Hrs  T(F): 98.1 (09-19-19 @ 04:29), Max: 98.1 (09-19-19 @ 04:29)  HR: 91 (09-19-19 @ 06:42) (91 - 91)  BP: 143/70 (09-19-19 @ 06:42) (143/70 - 143/70)  BP(mean): --  RR: 18 (09-19-19 @ 06:42) (18 - 18)  SpO2: 96% (09-19-19 @ 06:42) (96% - 96%)  Neck: No palpable thyroid nodules.  CVS: S1S2, No murmurs  Respiratory: No wheezing, no crepitations  GI: Abdomen soft, bowel sounds positive  Musculoskeletal:  edema lower extremities.   Skin: No skin rashes, no ecchymosis    Diagnostics Chief complaint  Patient is a 64y old  Male who presents with a chief complaint of Chest Pain (19 Sep 2019 06:54)   Review of systems  Patient in bed, looks comfortable,  no fever, no hypoglycemia.    Labs and Fingersticks  CAPILLARY BLOOD GLUCOSE      POCT Blood Glucose.: 179 mg/dL (19 Sep 2019 05:49)  POCT Blood Glucose.: 139 mg/dL (19 Sep 2019 00:17)  POCT Blood Glucose.: 165 mg/dL (18 Sep 2019 22:28)  POCT Blood Glucose.: 144 mg/dL (18 Sep 2019 21:01)  POCT Blood Glucose.: 139 mg/dL (18 Sep 2019 17:49)      Anion Gap, Serum: 13 (09-19 @ 04:57)  Anion Gap, Serum: 13 (09-18 @ 06:14)      Calcium, Total Serum: 10.0 (09-19 @ 04:57)  Calcium, Total Serum: 9.9 (09-18 @ 06:14)  Albumin, Serum: 3.9 (09-19 @ 04:57)  Albumin, Serum: 4.0 (09-18 @ 06:14)    Alanine Aminotransferase (ALT/SGPT): 30 (09-19 @ 04:57)  Alanine Aminotransferase (ALT/SGPT): 35 (09-18 @ 06:14)  Alkaline Phosphatase, Serum: 87 (09-19 @ 04:57)  Alkaline Phosphatase, Serum: 97 (09-18 @ 06:14)  Aspartate Aminotransferase (AST/SGOT): 16 (09-19 @ 04:57)  Aspartate Aminotransferase (AST/SGOT): 20 (09-18 @ 06:14)        09-19    137  |  100  |  22  ----------------------------<  188<H>  3.7   |  24  |  0.98    Ca    10.0      19 Sep 2019 04:57  Phos  3.8     09-19  Mg     1.8     09-19    TPro  6.8  /  Alb  3.9  /  TBili  0.8  /  DBili  x   /  AST  16  /  ALT  30  /  AlkPhos  87  09-19                        14.9   10.8  )-----------( 126      ( 19 Sep 2019 05:00 )             44.0     Medications  MEDICATIONS  (STANDING):  influenza   Vaccine 0.5 milliLiter(s) IntraMuscular once      Physical Exam  General: Patient comfortable in bed  Vital Signs Last 12 Hrs  T(F): 98.1 (09-19-19 @ 04:29), Max: 98.1 (09-19-19 @ 04:29)  HR: 91 (09-19-19 @ 06:42) (91 - 91)  BP: 143/70 (09-19-19 @ 06:42) (143/70 - 143/70)  BP(mean): --  RR: 18 (09-19-19 @ 06:42) (18 - 18)  SpO2: 96% (09-19-19 @ 06:42) (96% - 96%)  Neck: No palpable thyroid nodules.  CVS: S1S2, No murmurs  Respiratory: No wheezing, no crepitations  GI: Abdomen soft, bowel sounds positive  Musculoskeletal:  edema lower extremities.   Skin: No skin rashes, no ecchymosis    Diagnostics

## 2019-09-19 NOTE — PROGRESS NOTE ADULT - SUBJECTIVE AND OBJECTIVE BOX
INTERVAL EVENTS: TROY    SUBJECTIVE: No acute complaints. Feels ready for surgery. Motivated to quit smoking.    ROS:  General - No fevers or chills  Cards - No chest pain or palpitations  Pulm - No cough or shortness of breath  GI - No abdominal pain, diarrhea, constipation, melena, or hematochezia.   - No dysuria or hematuria    OBJECTIVE:  Vitals Last 24 hrs  Vital Signs Last 24 Hrs  T(C): 36.7 (19 Sep 2019 06:42), Max: 36.7 (19 Sep 2019 04:29)  T(F): 98.1 (19 Sep 2019 04:29), Max: 98.1 (19 Sep 2019 04:29)  HR: 91 (19 Sep 2019 06:42) (80 - 91)  BP: 143/70 (19 Sep 2019 06:42) (143/70 - 153/80)  BP(mean): --  RR: 18 (19 Sep 2019 06:42) (18 - 18)  SpO2: 96% (19 Sep 2019 06:42) (96% - 98%)    IOs  I&O's Summary    17 Sep 2019 07:01  -  18 Sep 2019 07:00  --------------------------------------------------------  IN: 360 mL / OUT: 0 mL / NET: 360 mL    18 Sep 2019 07:01  -  19 Sep 2019 06:55  --------------------------------------------------------  IN: 1180 mL / OUT: 0 mL / NET: 1180 mL        PE:  General - NAD, sitting up in bed.  Heart - Normal S1 and S2. No murmurs auscultated.  Lungs - Clear to auscultation bilaterally  GI - Abdomen soft, NT, ND.  Extremities - No lower extremity edema. Peripheral pulses intact. Calves nontender.  Skin - No rashes on extremities    LABS:  09-19    137  |  100  |  22  ----------------------------<  188<H>  3.7   |  24  |  0.98    Ca    10.0      19 Sep 2019 04:57  Phos  3.8     09-19  Mg     1.8     09-19    TPro  6.8  /  Alb  3.9  /  TBili  0.8  /  DBili  x   /  AST  16  /  ALT  30  /  AlkPhos  87  09-19                            14.9   10.8  )-----------( 126      ( 19 Sep 2019 05:00 )             44.0           IMAGING: INTERVAL EVENTS: TROY    SUBJECTIVE: No acute complaints. Feels ready for surgery. Motivated to quit smoking.    ROS:  General - No fevers or chills  Cards - No chest pain or palpitations  Pulm - No cough or shortness of breath  GI - No abdominal pain, diarrhea, constipation, melena, or hematochezia.   - No dysuria or hematuria    OBJECTIVE:  Vitals Last 24 hrs  Vital Signs Last 24 Hrs  T(C): 36.7 (19 Sep 2019 06:42), Max: 36.7 (19 Sep 2019 04:29)  T(F): 98.1 (19 Sep 2019 04:29), Max: 98.1 (19 Sep 2019 04:29)  HR: 91 (19 Sep 2019 06:42) (80 - 91)  BP: 143/70 (19 Sep 2019 06:42) (143/70 - 153/80)  BP(mean): --  RR: 18 (19 Sep 2019 06:42) (18 - 18)  SpO2: 96% (19 Sep 2019 06:42) (96% - 98%)    IOs  I&O's Summary    17 Sep 2019 07:01  -  18 Sep 2019 07:00  --------------------------------------------------------  IN: 360 mL / OUT: 0 mL / NET: 360 mL    18 Sep 2019 07:01  -  19 Sep 2019 06:55  --------------------------------------------------------  IN: 1180 mL / OUT: 0 mL / NET: 1180 mL        PE:  General - NAD, sitting up in bed.  Heart - Normal S1 and S2. No murmurs auscultated.  Lungs - Clear to auscultation bilaterally  GI - Abdomen soft, NT, ND.  Extremities - No lower extremity edema. Peripheral pulses intact. Calves nontender.  Skin - No rashes on extremities    LABS:  09-19    137  |  100  |  22  ----------------------------<  188<H>  3.7   |  24  |  0.98    Ca    10.0      19 Sep 2019 04:57  Phos  3.8     09-19  Mg     1.8     09-19    TPro  6.8  /  Alb  3.9  /  TBili  0.8  /  DBili  x   /  AST  16  /  ALT  30  /  AlkPhos  87  09-19                            14.9   10.8  )-----------( 126      ( 19 Sep 2019 05:00 )             44.0           IMAGING: No new imaging    Tele: Sinus 70s-90s, no ectopy.

## 2019-09-19 NOTE — PROGRESS NOTE ADULT - PROBLEM SELECTOR PLAN 3
HbA1c 9.2%. BGs mid 100-200's  - ISS, but low requirements  - Fructosamine normal  - D/c on insulin given HbA1c HbA1c 9.2%. BGs mid 100-200's  - ISS, but low requirements  - Fructosamine normal  - D/c on insulin given HbA1c  -F/u endo recs.

## 2019-09-19 NOTE — BRIEF OPERATIVE NOTE - OPERATION/FINDINGS
CABG x4- LIMA to LAD, SVG to OM1, SVG to OM2, SVG to Diag.  Insertion of right femoral balloon pump.  Central cannulation for VA Ecmo for biventricular dysfunction

## 2019-09-20 LAB
ALBUMIN SERPL ELPH-MCNC: 2.1 G/DL — LOW (ref 3.3–5)
ALBUMIN SERPL ELPH-MCNC: 2.2 G/DL — LOW (ref 3.3–5)
ALBUMIN SERPL ELPH-MCNC: 2.3 G/DL — LOW (ref 3.3–5)
ALP SERPL-CCNC: 26 U/L — LOW (ref 40–120)
ALP SERPL-CCNC: 27 U/L — LOW (ref 40–120)
ALP SERPL-CCNC: 45 U/L — SIGNIFICANT CHANGE UP (ref 40–120)
ALT FLD-CCNC: 324 U/L — HIGH (ref 10–45)
ALT FLD-CCNC: 356 U/L — HIGH (ref 10–45)
ALT FLD-CCNC: 59 U/L — HIGH (ref 10–45)
ANION GAP SERPL CALC-SCNC: 21 MMOL/L — HIGH (ref 5–17)
ANION GAP SERPL CALC-SCNC: 24 MMOL/L — HIGH (ref 5–17)
ANION GAP SERPL CALC-SCNC: 26 MMOL/L — HIGH (ref 5–17)
APTT BLD: 41.5 SEC — HIGH (ref 27.5–36.3)
APTT BLD: 51.3 SEC — HIGH (ref 27.5–36.3)
APTT BLD: 56.3 SEC — HIGH (ref 27.5–36.3)
APTT BLD: >200 SEC — CRITICAL HIGH (ref 27.5–36.3)
APTT BLD: >200 SEC — SIGNIFICANT CHANGE UP (ref 27.5–36.3)
AST SERPL-CCNC: 157 U/L — HIGH (ref 10–40)
AST SERPL-CCNC: 814 U/L — HIGH (ref 10–40)
AST SERPL-CCNC: 914 U/L — HIGH (ref 10–40)
BASOPHILS # BLD AUTO: 0 K/UL — SIGNIFICANT CHANGE UP (ref 0–0.2)
BASOPHILS NFR BLD AUTO: 0.1 % — SIGNIFICANT CHANGE UP (ref 0–2)
BILIRUB SERPL-MCNC: 2 MG/DL — HIGH (ref 0.2–1.2)
BILIRUB SERPL-MCNC: 2.2 MG/DL — HIGH (ref 0.2–1.2)
BILIRUB SERPL-MCNC: 2.4 MG/DL — HIGH (ref 0.2–1.2)
BUN SERPL-MCNC: 16 MG/DL — SIGNIFICANT CHANGE UP (ref 7–23)
BUN SERPL-MCNC: 21 MG/DL — SIGNIFICANT CHANGE UP (ref 7–23)
BUN SERPL-MCNC: 23 MG/DL — SIGNIFICANT CHANGE UP (ref 7–23)
CALCIUM SERPL-MCNC: 7.4 MG/DL — LOW (ref 8.4–10.5)
CALCIUM SERPL-MCNC: 7.5 MG/DL — LOW (ref 8.4–10.5)
CALCIUM SERPL-MCNC: 8.1 MG/DL — LOW (ref 8.4–10.5)
CHLORIDE SERPL-SCNC: 105 MMOL/L — SIGNIFICANT CHANGE UP (ref 96–108)
CHLORIDE SERPL-SCNC: 107 MMOL/L — SIGNIFICANT CHANGE UP (ref 96–108)
CHLORIDE SERPL-SCNC: 109 MMOL/L — HIGH (ref 96–108)
CK SERPL-CCNC: 1162 U/L — HIGH (ref 30–200)
CK SERPL-CCNC: 2923 U/L — HIGH (ref 30–200)
CO2 SERPL-SCNC: 18 MMOL/L — LOW (ref 22–31)
CO2 SERPL-SCNC: 18 MMOL/L — LOW (ref 22–31)
CO2 SERPL-SCNC: 19 MMOL/L — LOW (ref 22–31)
CREAT SERPL-MCNC: 1.41 MG/DL — HIGH (ref 0.5–1.3)
CREAT SERPL-MCNC: 1.93 MG/DL — HIGH (ref 0.5–1.3)
CREAT SERPL-MCNC: 2 MG/DL — HIGH (ref 0.5–1.3)
D DIMER BLD IA.RAPID-MCNC: 841 NG/ML DDU — HIGH
EOSINOPHIL # BLD AUTO: 0 K/UL — SIGNIFICANT CHANGE UP (ref 0–0.5)
EOSINOPHIL NFR BLD AUTO: 0.4 % — SIGNIFICANT CHANGE UP (ref 0–6)
FIBRINOGEN PPP-MCNC: 286 MG/DL — LOW (ref 350–510)
FIBRINOGEN PPP-MCNC: 370 MG/DL — SIGNIFICANT CHANGE UP (ref 350–510)
FIBRINOGEN PPP-MCNC: 375 MG/DL — SIGNIFICANT CHANGE UP (ref 350–510)
FIBRINOGEN PPP-MCNC: 381 MG/DL — SIGNIFICANT CHANGE UP (ref 350–510)
GAS PNL BLDA: SIGNIFICANT CHANGE UP
GLUCOSE BLDC GLUCOMTR-MCNC: 112 MG/DL — HIGH (ref 70–99)
GLUCOSE BLDC GLUCOMTR-MCNC: 117 MG/DL — HIGH (ref 70–99)
GLUCOSE BLDC GLUCOMTR-MCNC: 120 MG/DL — HIGH (ref 70–99)
GLUCOSE BLDC GLUCOMTR-MCNC: 126 MG/DL — HIGH (ref 70–99)
GLUCOSE BLDC GLUCOMTR-MCNC: 130 MG/DL — HIGH (ref 70–99)
GLUCOSE BLDC GLUCOMTR-MCNC: 139 MG/DL — HIGH (ref 70–99)
GLUCOSE BLDC GLUCOMTR-MCNC: 244 MG/DL — HIGH (ref 70–99)
GLUCOSE SERPL-MCNC: 167 MG/DL — HIGH (ref 70–99)
GLUCOSE SERPL-MCNC: 195 MG/DL — HIGH (ref 70–99)
GLUCOSE SERPL-MCNC: 235 MG/DL — HIGH (ref 70–99)
HAPTOGLOB SERPL-MCNC: 31 MG/DL — LOW (ref 34–200)
HCT VFR BLD CALC: 24.7 % — LOW (ref 39–50)
HCT VFR BLD CALC: 27.7 % — LOW (ref 39–50)
HCT VFR BLD CALC: 28.6 % — LOW (ref 39–50)
HCT VFR BLD CALC: 34 % — LOW (ref 39–50)
HGB BLD-MCNC: 10.2 G/DL — LOW (ref 13–17)
HGB BLD-MCNC: 11.3 G/DL — LOW (ref 13–17)
HGB BLD-MCNC: 8.2 G/DL — LOW (ref 13–17)
HGB BLD-MCNC: 9.8 G/DL — LOW (ref 13–17)
INR BLD: 1.15 RATIO — SIGNIFICANT CHANGE UP (ref 0.88–1.16)
INR BLD: 1.24 RATIO — HIGH (ref 0.88–1.16)
INR BLD: 1.54 RATIO — HIGH (ref 0.88–1.16)
INR BLD: 1.77 RATIO — HIGH (ref 0.88–1.16)
LDH SERPL L TO P-CCNC: 624 U/L — HIGH (ref 50–242)
LYMPHOCYTES # BLD AUTO: 1.4 K/UL — SIGNIFICANT CHANGE UP (ref 1–3.3)
LYMPHOCYTES # BLD AUTO: 26.6 % — SIGNIFICANT CHANGE UP (ref 13–44)
MAGNESIUM SERPL-MCNC: 2.8 MG/DL — HIGH (ref 1.6–2.6)
MCHC RBC-ENTMCNC: 30.6 PG — SIGNIFICANT CHANGE UP (ref 27–34)
MCHC RBC-ENTMCNC: 30.6 PG — SIGNIFICANT CHANGE UP (ref 27–34)
MCHC RBC-ENTMCNC: 32.1 PG — SIGNIFICANT CHANGE UP (ref 27–34)
MCHC RBC-ENTMCNC: 32.9 PG — SIGNIFICANT CHANGE UP (ref 27–34)
MCHC RBC-ENTMCNC: 33.3 GM/DL — SIGNIFICANT CHANGE UP (ref 32–36)
MCHC RBC-ENTMCNC: 33.4 GM/DL — SIGNIFICANT CHANGE UP (ref 32–36)
MCHC RBC-ENTMCNC: 35.4 GM/DL — SIGNIFICANT CHANGE UP (ref 32–36)
MCHC RBC-ENTMCNC: 35.7 GM/DL — SIGNIFICANT CHANGE UP (ref 32–36)
MCV RBC AUTO: 90.8 FL — SIGNIFICANT CHANGE UP (ref 80–100)
MCV RBC AUTO: 91.8 FL — SIGNIFICANT CHANGE UP (ref 80–100)
MCV RBC AUTO: 92.1 FL — SIGNIFICANT CHANGE UP (ref 80–100)
MCV RBC AUTO: 92.3 FL — SIGNIFICANT CHANGE UP (ref 80–100)
MONOCYTES # BLD AUTO: 0.6 K/UL — SIGNIFICANT CHANGE UP (ref 0–0.9)
MONOCYTES NFR BLD AUTO: 12.1 % — SIGNIFICANT CHANGE UP (ref 2–14)
NEUTROPHILS # BLD AUTO: 3.1 K/UL — SIGNIFICANT CHANGE UP (ref 1.8–7.4)
NEUTROPHILS NFR BLD AUTO: 60.8 % — SIGNIFICANT CHANGE UP (ref 43–77)
PHOSPHATE SERPL-MCNC: 1.9 MG/DL — LOW (ref 2.5–4.5)
PLATELET # BLD AUTO: 102 K/UL — LOW (ref 150–400)
PLATELET # BLD AUTO: 106 K/UL — LOW (ref 150–400)
PLATELET # BLD AUTO: 140 K/UL — LOW (ref 150–400)
PLATELET # BLD AUTO: 93 K/UL — LOW (ref 150–400)
POTASSIUM BLDA-SCNC: 5.2 MMOL/L — SIGNIFICANT CHANGE UP (ref 3.5–5.3)
POTASSIUM SERPL-MCNC: 3.4 MMOL/L — LOW (ref 3.5–5.3)
POTASSIUM SERPL-MCNC: 5.7 MMOL/L — HIGH (ref 3.5–5.3)
POTASSIUM SERPL-MCNC: 7.3 MMOL/L — CRITICAL HIGH (ref 3.5–5.3)
POTASSIUM SERPL-SCNC: 3.4 MMOL/L — LOW (ref 3.5–5.3)
POTASSIUM SERPL-SCNC: 5.7 MMOL/L — HIGH (ref 3.5–5.3)
POTASSIUM SERPL-SCNC: 7.3 MMOL/L — CRITICAL HIGH (ref 3.5–5.3)
PROT SERPL-MCNC: 3.1 G/DL — LOW (ref 6–8.3)
PROT SERPL-MCNC: 3.4 G/DL — LOW (ref 6–8.3)
PROT SERPL-MCNC: 3.8 G/DL — LOW (ref 6–8.3)
PROTHROM AB SERPL-ACNC: 13.3 SEC — HIGH (ref 10–12.9)
PROTHROM AB SERPL-ACNC: 14.4 SEC — HIGH (ref 10–12.9)
PROTHROM AB SERPL-ACNC: 17.8 SEC — HIGH (ref 10–12.9)
PROTHROM AB SERPL-ACNC: 20.7 SEC — HIGH (ref 10–12.9)
RBC # BLD: 2.68 M/UL — LOW (ref 4.2–5.8)
RBC # BLD: 3.05 M/UL — LOW (ref 4.2–5.8)
RBC # BLD: 3.1 M/UL — LOW (ref 4.2–5.8)
RBC # BLD: 3.7 M/UL — LOW (ref 4.2–5.8)
RBC # FLD: 12.6 % — SIGNIFICANT CHANGE UP (ref 10.3–14.5)
RBC # FLD: 12.8 % — SIGNIFICANT CHANGE UP (ref 10.3–14.5)
RBC # FLD: 12.9 % — SIGNIFICANT CHANGE UP (ref 10.3–14.5)
RBC # FLD: 13.1 % — SIGNIFICANT CHANGE UP (ref 10.3–14.5)
SODIUM SERPL-SCNC: 149 MMOL/L — HIGH (ref 135–145)
WBC # BLD: 5.1 K/UL — SIGNIFICANT CHANGE UP (ref 3.8–10.5)
WBC # BLD: 6 K/UL — SIGNIFICANT CHANGE UP (ref 3.8–10.5)
WBC # BLD: 6.4 K/UL — SIGNIFICANT CHANGE UP (ref 3.8–10.5)
WBC # BLD: 9.2 K/UL — SIGNIFICANT CHANGE UP (ref 3.8–10.5)
WBC # FLD AUTO: 5.1 K/UL — SIGNIFICANT CHANGE UP (ref 3.8–10.5)
WBC # FLD AUTO: 6 K/UL — SIGNIFICANT CHANGE UP (ref 3.8–10.5)
WBC # FLD AUTO: 6.4 K/UL — SIGNIFICANT CHANGE UP (ref 3.8–10.5)
WBC # FLD AUTO: 9.2 K/UL — SIGNIFICANT CHANGE UP (ref 3.8–10.5)

## 2019-09-20 PROCEDURE — 93010 ELECTROCARDIOGRAM REPORT: CPT

## 2019-09-20 PROCEDURE — 99291 CRITICAL CARE FIRST HOUR: CPT

## 2019-09-20 PROCEDURE — 99232 SBSQ HOSP IP/OBS MODERATE 35: CPT

## 2019-09-20 PROCEDURE — 71045 X-RAY EXAM CHEST 1 VIEW: CPT | Mod: 26,76

## 2019-09-20 PROCEDURE — 70450 CT HEAD/BRAIN W/O DYE: CPT | Mod: 26

## 2019-09-20 RX ORDER — DEXTROSE 50 % IN WATER 50 %
50 SYRINGE (ML) INTRAVENOUS ONCE
Refills: 0 | Status: COMPLETED | OUTPATIENT
Start: 2019-09-20 | End: 2019-09-20

## 2019-09-20 RX ORDER — NICARDIPINE HYDROCHLORIDE 30 MG/1
5 CAPSULE, EXTENDED RELEASE ORAL
Qty: 40 | Refills: 0 | Status: DISCONTINUED | OUTPATIENT
Start: 2019-09-20 | End: 2019-09-20

## 2019-09-20 RX ORDER — SODIUM BICARBONATE 1 MEQ/ML
50 SYRINGE (ML) INTRAVENOUS ONCE
Refills: 0 | Status: COMPLETED | OUTPATIENT
Start: 2019-09-20 | End: 2019-09-20

## 2019-09-20 RX ORDER — VANCOMYCIN HCL 500 MG
5 VIAL (EA) INTRAVENOUS ONCE
Refills: 0 | Status: COMPLETED | OUTPATIENT
Start: 2019-09-20 | End: 2019-09-20

## 2019-09-20 RX ORDER — CALCIUM GLUCONATE 100 MG/ML
2 VIAL (ML) INTRAVENOUS ONCE
Refills: 0 | Status: COMPLETED | OUTPATIENT
Start: 2019-09-20 | End: 2019-09-20

## 2019-09-20 RX ORDER — ALBUTEROL 90 UG/1
2.5 AEROSOL, METERED ORAL ONCE
Refills: 0 | Status: COMPLETED | OUTPATIENT
Start: 2019-09-20 | End: 2019-09-20

## 2019-09-20 RX ORDER — POTASSIUM CHLORIDE 20 MEQ
10 PACKET (EA) ORAL
Refills: 0 | Status: COMPLETED | OUTPATIENT
Start: 2019-09-20 | End: 2019-09-20

## 2019-09-20 RX ORDER — CALCIUM CHLORIDE
1000 POWDER (GRAM) MISCELLANEOUS ONCE
Refills: 0 | Status: COMPLETED | OUTPATIENT
Start: 2019-09-20 | End: 2019-09-20

## 2019-09-20 RX ORDER — HYDROCORTISONE 20 MG
100 TABLET ORAL EVERY 6 HOURS
Refills: 0 | Status: DISCONTINUED | OUTPATIENT
Start: 2019-09-20 | End: 2019-09-21

## 2019-09-20 RX ORDER — ALBUMIN HUMAN 25 %
250 VIAL (ML) INTRAVENOUS ONCE
Refills: 0 | Status: COMPLETED | OUTPATIENT
Start: 2019-09-20 | End: 2019-09-20

## 2019-09-20 RX ORDER — INSULIN HUMAN 100 [IU]/ML
10 INJECTION, SOLUTION SUBCUTANEOUS ONCE
Refills: 0 | Status: COMPLETED | OUTPATIENT
Start: 2019-09-20 | End: 2019-09-20

## 2019-09-20 RX ORDER — DESMOPRESSIN ACETATE 0.1 MG/1
20 TABLET ORAL ONCE
Refills: 0 | Status: COMPLETED | OUTPATIENT
Start: 2019-09-20 | End: 2019-09-20

## 2019-09-20 RX ORDER — INSULIN HUMAN 100 [IU]/ML
10 INJECTION, SOLUTION SUBCUTANEOUS ONCE
Refills: 0 | Status: DISCONTINUED | OUTPATIENT
Start: 2019-09-20 | End: 2019-09-20

## 2019-09-20 RX ORDER — HYDRALAZINE HCL 50 MG
10 TABLET ORAL ONCE
Refills: 0 | Status: COMPLETED | OUTPATIENT
Start: 2019-09-20 | End: 2019-09-20

## 2019-09-20 RX ORDER — CALCIUM GLUCONATE 100 MG/ML
1 VIAL (ML) INTRAVENOUS ONCE
Refills: 0 | Status: COMPLETED | OUTPATIENT
Start: 2019-09-20 | End: 2019-09-20

## 2019-09-20 RX ORDER — PROPOFOL 10 MG/ML
15 INJECTION, EMULSION INTRAVENOUS
Qty: 1000 | Refills: 0 | Status: DISCONTINUED | OUTPATIENT
Start: 2019-09-20 | End: 2019-09-21

## 2019-09-20 RX ORDER — SODIUM CHLORIDE 9 MG/ML
500 INJECTION INTRAMUSCULAR; INTRAVENOUS; SUBCUTANEOUS ONCE
Refills: 0 | Status: COMPLETED | OUTPATIENT
Start: 2019-09-20 | End: 2019-09-20

## 2019-09-20 RX ORDER — HYDROMORPHONE HYDROCHLORIDE 2 MG/ML
1 INJECTION INTRAMUSCULAR; INTRAVENOUS; SUBCUTANEOUS ONCE
Refills: 0 | Status: DISCONTINUED | OUTPATIENT
Start: 2019-09-20 | End: 2019-09-20

## 2019-09-20 RX ORDER — CALCIUM CHLORIDE
1000 POWDER (GRAM) MISCELLANEOUS ONCE
Refills: 0 | Status: DISCONTINUED | OUTPATIENT
Start: 2019-09-20 | End: 2019-09-20

## 2019-09-20 RX ADMIN — ALBUTEROL 2.5 MILLIGRAM(S): 90 AEROSOL, METERED ORAL at 20:02

## 2019-09-20 RX ADMIN — Medication 500 MILLILITER(S): at 07:55

## 2019-09-20 RX ADMIN — INSULIN HUMAN 10 UNIT(S): 100 INJECTION, SOLUTION SUBCUTANEOUS at 19:45

## 2019-09-20 RX ADMIN — CHLORHEXIDINE GLUCONATE 5 MILLILITER(S): 213 SOLUTION TOPICAL at 03:03

## 2019-09-20 RX ADMIN — Medication 10 MILLIGRAM(S): at 22:16

## 2019-09-20 RX ADMIN — Medication 50 MILLILITER(S): at 16:44

## 2019-09-20 RX ADMIN — INSULIN HUMAN 10 UNIT(S): 100 INJECTION, SOLUTION SUBCUTANEOUS at 16:44

## 2019-09-20 RX ADMIN — Medication 100 MILLIGRAM(S): at 13:35

## 2019-09-20 RX ADMIN — Medication 50 MILLIEQUIVALENT(S): at 00:33

## 2019-09-20 RX ADMIN — HYDROMORPHONE HYDROCHLORIDE 1 MILLIGRAM(S): 2 INJECTION INTRAMUSCULAR; INTRAVENOUS; SUBCUTANEOUS at 00:55

## 2019-09-20 RX ADMIN — Medication 10 MILLIGRAM(S): at 05:04

## 2019-09-20 RX ADMIN — Medication 50 MILLIEQUIVALENT(S): at 15:00

## 2019-09-20 RX ADMIN — CHLORHEXIDINE GLUCONATE 5 MILLILITER(S): 213 SOLUTION TOPICAL at 17:58

## 2019-09-20 RX ADMIN — Medication 50 MILLILITER(S): at 15:45

## 2019-09-20 RX ADMIN — CHLORHEXIDINE GLUCONATE 5 MILLILITER(S): 213 SOLUTION TOPICAL at 05:04

## 2019-09-20 RX ADMIN — CHLORHEXIDINE GLUCONATE 5 MILLILITER(S): 213 SOLUTION TOPICAL at 13:35

## 2019-09-20 RX ADMIN — Medication 10 MILLIGRAM(S): at 13:36

## 2019-09-20 RX ADMIN — Medication 125 MILLILITER(S): at 05:06

## 2019-09-20 RX ADMIN — INSULIN HUMAN 10 UNIT(S): 100 INJECTION, SOLUTION SUBCUTANEOUS at 20:15

## 2019-09-20 RX ADMIN — Medication 100 MILLIGRAM(S): at 13:42

## 2019-09-20 RX ADMIN — Medication 1000 MILLIGRAM(S): at 16:43

## 2019-09-20 RX ADMIN — DESMOPRESSIN ACETATE 220 MICROGRAM(S): 0.1 TABLET ORAL at 16:07

## 2019-09-20 RX ADMIN — Medication 50 MILLIEQUIVALENT(S): at 16:43

## 2019-09-20 RX ADMIN — Medication 50 MILLILITER(S): at 12:40

## 2019-09-20 RX ADMIN — Medication 50 MILLIEQUIVALENT(S): at 15:20

## 2019-09-20 RX ADMIN — Medication 50 MILLILITER(S): at 20:00

## 2019-09-20 RX ADMIN — CHLORHEXIDINE GLUCONATE 5 MILLILITER(S): 213 SOLUTION TOPICAL at 22:16

## 2019-09-20 RX ADMIN — INSULIN HUMAN 10 UNIT(S): 100 INJECTION, SOLUTION SUBCUTANEOUS at 16:45

## 2019-09-20 RX ADMIN — Medication 50 MILLIEQUIVALENT(S): at 08:10

## 2019-09-20 RX ADMIN — HYDROMORPHONE HYDROCHLORIDE 1 MILLIGRAM(S): 2 INJECTION INTRAMUSCULAR; INTRAVENOUS; SUBCUTANEOUS at 00:40

## 2019-09-20 RX ADMIN — SODIUM CHLORIDE 10 MILLILITER(S): 9 INJECTION INTRAMUSCULAR; INTRAVENOUS; SUBCUTANEOUS at 20:00

## 2019-09-20 RX ADMIN — Medication 1000 MILLIGRAM(S): at 15:45

## 2019-09-20 RX ADMIN — Medication 10 MILLIGRAM(S): at 00:00

## 2019-09-20 RX ADMIN — INSULIN HUMAN 2 UNIT(S)/HR: 100 INJECTION, SOLUTION SUBCUTANEOUS at 20:00

## 2019-09-20 RX ADMIN — Medication 50 MILLIEQUIVALENT(S): at 02:55

## 2019-09-20 RX ADMIN — Medication 50 MILLIEQUIVALENT(S): at 01:15

## 2019-09-20 RX ADMIN — Medication 50 MILLILITER(S): at 13:39

## 2019-09-20 RX ADMIN — Medication 50 MILLIEQUIVALENT(S): at 03:31

## 2019-09-20 RX ADMIN — Medication 50 MILLILITER(S): at 19:45

## 2019-09-20 RX ADMIN — INSULIN HUMAN 10 UNIT(S): 100 INJECTION, SOLUTION SUBCUTANEOUS at 13:44

## 2019-09-20 RX ADMIN — INSULIN HUMAN 10 UNIT(S): 100 INJECTION, SOLUTION SUBCUTANEOUS at 13:45

## 2019-09-20 RX ADMIN — SODIUM CHLORIDE 3000 MILLILITER(S): 9 INJECTION INTRAMUSCULAR; INTRAVENOUS; SUBCUTANEOUS at 09:20

## 2019-09-20 RX ADMIN — Medication 1000 MILLIGRAM(S): at 20:00

## 2019-09-20 RX ADMIN — Medication 125 MILLILITER(S): at 08:30

## 2019-09-20 RX ADMIN — Medication 100 MILLIGRAM(S): at 22:16

## 2019-09-20 RX ADMIN — Medication 10 MILLIGRAM(S): at 02:30

## 2019-09-20 RX ADMIN — CHLORHEXIDINE GLUCONATE 1 APPLICATION(S): 213 SOLUTION TOPICAL at 05:04

## 2019-09-20 RX ADMIN — Medication 100 MILLIGRAM(S): at 17:58

## 2019-09-20 RX ADMIN — Medication 100 MILLIGRAM(S): at 05:04

## 2019-09-20 RX ADMIN — Medication 800 GRAM(S): at 11:43

## 2019-09-20 RX ADMIN — Medication 100 MILLIGRAM(S): at 07:55

## 2019-09-20 RX ADMIN — Medication 200 GRAM(S): at 13:45

## 2019-09-20 RX ADMIN — Medication 200 GRAM(S): at 00:00

## 2019-09-20 RX ADMIN — Medication 200 GRAM(S): at 08:15

## 2019-09-20 RX ADMIN — Medication 125 MILLILITER(S): at 02:00

## 2019-09-20 RX ADMIN — CHLORHEXIDINE GLUCONATE 5 MILLILITER(S): 213 SOLUTION TOPICAL at 11:44

## 2019-09-20 RX ADMIN — Medication 50 MILLIEQUIVALENT(S): at 00:30

## 2019-09-20 RX ADMIN — Medication 50 MILLIEQUIVALENT(S): at 05:04

## 2019-09-20 NOTE — PROGRESS NOTE ADULT - SUBJECTIVE AND OBJECTIVE BOX
RUIZ DEMPSEY  MRN#:  [id number]    The patient is a 64y Male with PMH of T2DM, HTN and current 2ppd smoking admitted with unstable angina and found to have multivessel CAD with relatively normal contractility, s/p C4L today complicated by cardiogenic shock and ultimately near cardiac standstill requiring initially IABP and subsequently VA ECMO who was seen, evaluated, & examined with the CTICU staff overnight and during the early morning hours with a multidisciplinary care plan formulated & implemented.  All available clinical, laboratory, radiographic, pharmacologic, and electrocardiographic data were reviewed & analyzed.      The patient was in the CTICU in critical condition secondary to persistent cardiopulmonary dysfunction, hemodynamically significant anemia/hypovolemia-shock, post operative hemorrhage, acidosis, & hyperglycemia.      Respiratory status required full ventilatory support as well as VA ECMO, close monitoring of respiratory rate and breathing pattern, the following of ABG’s with A-line monitoring, continuous pulse oximetry monitoring, an IV Propofol infusion for support & to evaluate for & prevent further decompensation secondary to cardiogenic shock.    Invasive hemodynamic monitoring with a central venous catheter & an A-line were required for the continuous central venous and MAP/BP monitoring to ensure adequate cardiovascular support and to evaluate for & help prevent decompensation while receiving intermittent volume expansion, blood transfusions, blood product transfusions, an IV Protonix drip, an IV Levophed drip, an IV Dobutamine drip, VA ECMO and intra aortic balloon counterpulsation for cardiac decompression secondary to post operative hemorrhage and hemodynamically significant anemia, consumptive coagulopathy, hypovolemic shock, cardiogenic shock, bloody NG drainage and lactic acidosis. Close monitoring of peripheral perfusion due to severe PVD and atherosclerotic aorta with loss of pedal and posterior tibial pulses. Patient has ongoing extreme lability of blood pressure, responsive to volume resuscitation. Intermittently on an Nicardipine infusion alternating with IV Norepinephrine drip. Balloon pump timing adjusted. Open chest dressing bulging and patient will likely require evacuation.    Metabolic stability, uncontrolled type 2 diabetes-hyperglycemia, & infection prophylaxis required an IV regular Insulin drip & the following of serial glucose levels to help achieve & maintain euglycemia.      Patient required acute postoperative critical care management and I provided 50 minutes of non-continuous care to the patient. I reviewed and assessed all available clinical, laboratory, radiographic, pharmacologic, and electrocardiographic data in order to decide on maintenance or adjustment of medications, ventilator settings and management of respiratory status, and fluid management.  Discussed at length with the CTICU staff and helped coordinate care.

## 2019-09-20 NOTE — PROGRESS NOTE ADULT - PROBLEM SELECTOR PROBLEM 1
Unstable angina
CAD (coronary artery disease)
Type 2 diabetes mellitus with other neurologic complication, without long-term current use of insulin
Type 2 diabetes mellitus with other neurologic complication, without long-term current use of insulin
Unstable angina

## 2019-09-20 NOTE — PROVIDER CONTACT NOTE (OTHER) - SITUATION
Pt with fixed, non-reactive pupils. Unarousable to voice, unable to move extremities to command, no response to pain noted.

## 2019-09-20 NOTE — CONSULT NOTE ADULT - SUBJECTIVE AND OBJECTIVE BOX
NEPHROLOGY - NSN    Patient seen and examined.    HPI:  64 year-old [place] male with history of T2DM, HTN and current 2ppd smoking who presents with 6 days of left sternal chest pain. States he arrived from [facility] on Friday night, when he felt this pain for the first time. After this, it happened about once per day. Each of these times, he says it lasted about 2-3 minutes and was about a 5-6/10 in intensity. Each time, he was not exerting himself, but doing light activity such as bathing or bending over to pick something up. The pain is located at the left sternal border and is non-radiating. This morning, he was sleeping at about 3am and experienced more severe chest pain that woke him up. This lasted about 30 minutes and was 8/10 in intensity, which is what prompted him to come to the hospital. The pain was never associated with shortness of breath, sweating, or nausea. He has noticed a new cough that has been worse in the past week as well. He has never experienced this chest pain before. He takes metformin for diabetes and captopril for hypertension, but does not know the doses and states that he ran out of these of Tuesday.     He complains of a right-sided headache with posterior right neck pain that has been present for two weeks as well. This is intermittent and lasts for 15 minutes.   Pt was taken to the OR and had problems with weaning .  Pt placed on VA ecmo.  Course was cb GI bleed .  Head CT has numerous CVA  Hyperkalemia all day and the family wants the pt to be alive for family to return       PAST MEDICAL & SURGICAL HISTORY:  Type 2 diabetes mellitus with other neurologic complication, without long-term current use of insulin  Hypertension, unspecified type  H/O elbow surgery: Right elbow surgery 2007      MEDICATIONS  (STANDING):  cefuroxime  IVPB 1500 milliGRAM(s) IV Intermittent every 8 hours  chlorhexidine 0.12% Liquid 5 milliLiter(s) Oral Mucosa every 4 hours  chlorhexidine 2% Cloths 1 Application(s) Topical <User Schedule>  CRRT Treatment    <Continuous>  dextrose 5%. 1000 milliLiter(s) (15 mL/Hr) IV Continuous <Continuous>  dextrose 50% Injectable 50 milliLiter(s) IV Push every 15 minutes  dextrose 50% Injectable 25 milliLiter(s) IV Push every 15 minutes  DOBUTamine Infusion 5 MICROgram(s)/kG/Min (10.38 mL/Hr) IV Continuous <Continuous>  docusate sodium 100 milliGRAM(s) Oral three times a day  hydrocortisone sodium succinate Injectable 100 milliGRAM(s) IV Push every 6 hours  influenza   Vaccine 0.5 milliLiter(s) IntraMuscular once  insulin regular Infusion 2 Unit(s)/Hr (2 mL/Hr) IV Continuous <Continuous>  metoclopramide Injectable 10 milliGRAM(s) IV Push every 8 hours  mupirocin 2% Ointment 1 Application(s) Both Nostrils two times a day  norepinephrine Infusion 0.05 MICROgram(s)/kG/Min (6.488 mL/Hr) IV Continuous <Continuous>  pantoprazole Infusion 8 mG/Hr (10 mL/Hr) IV Continuous <Continuous>  PrismaSATE Dialysate BK 0 / 3.5 5000 milliLiter(s) (1000 mL/Hr) CRRT <Continuous>  PrismaSOL Filtration BGK 0 / 2.5 5000 milliLiter(s) (400 mL/Hr) CRRT <Continuous>  PrismaSOL Filtration BGK 0 / 2.5 5000 milliLiter(s) (400 mL/Hr) CRRT <Continuous>  propofol Infusion 15 MICROgram(s)/kG/Min (6.228 mL/Hr) IV Continuous <Continuous>  sodium chloride 0.9%. 1000 milliLiter(s) (10 mL/Hr) IV Continuous <Continuous>      Allergies    No Known Allergies    Intolerances        SOCIAL HISTORY:  unknown     FAMILY HISTORY:  FHx: hyperlipidemia  FHx: hypertension  FH: type 2 diabetes  Family history of breast cancer in sister  FH: coronary artery disease      VITALS:  T(C): 37 (09-20-19 @ 16:00), Max: 37 (09-20-19 @ 12:00)  HR: 69 (09-20-19 @ 17:00) (55 - 112)  BP: --  RR: 20 (09-20-19 @ 17:00) (12 - 20)  SpO2: 100% (09-20-19 @ 17:00) (69% - 100%)    REVIEW OF SYSTEMS:  unable     PHYSICAL EXAM:  Constitutional: intubated   HEENT: EOMI  Neck:  No JVD, supple   Respiratory: poor effort   Cardiovascular: S1 and S2, RRR  Gastrointestinal: + , soft, NT, ND  Extremities: No peripheral edema, + peripheral pulses  Neurological: unable   : No Garcia  Skin: No rashes, C/D/I  Access: Not applicable    I and O's:    09-18 @ 07:01  -  09-19 @ 07:00  --------------------------------------------------------  IN: 1180 mL / OUT: 0 mL / NET: 1180 mL    09-19 @ 07:01  -  09-20 @ 07:00  --------------------------------------------------------  IN: 5950.3 mL / OUT: 4730 mL / NET: 1220.3 mL    09-20 @ 07:01 - 09-20 @ 17:09  --------------------------------------------------------  IN: 5640.5 mL / OUT: 4985 mL / NET: 655.5 mL          LABS:                        10.2   6.4   )-----------( 93       ( 20 Sep 2019 11:23 )             28.6     09-20    149<H>  |  109<H>  |  21  ----------------------------<  235<H>  5.7<H>   |  19<L>  |  1.93<H>    Ca    7.4<L>      20 Sep 2019 12:32  Phos  1.9     09-20  Mg     2.8     09-20    TPro  3.1<L>  /  Alb  2.3<L>  /  TBili  2.4<H>  /  DBili  x   /  AST  814<H>  /  ALT  324<H>  /  AlkPhos  27<L>  09-20         RADIOLOGY & ADDITIONAL STUDIES:    < from: Xray Chest 1 View- PORTABLE-Urgent (09.20.19 @ 14:20) >    EXAM:  XR CHEST PORTABLE URGENT 1V                            PROCEDURE DATE:  09/20/2019            INTERPRETATION:  A single chest x-ray was obtained on September 20, 2019.    Indication: Status post CABG procedure.    Impression:    Poor inspiratory effort. The heart is enlarged. The lungs appear to be   clear. Intra-aortic balloon pump is about 6 cm away from the tip of the   aortic arch. All other life supporting devices are in good position and   unchanged when compared to previous study done the same date at 2:54 AM.   No pneumothorax.                    HERI HA M.D., ATTENDING RADIOLOGIST  This document has been electronically signed. Sep 20 2019  2:26PM

## 2019-09-20 NOTE — CONSULT NOTE ADULT - CONSULT REQUESTED DATE/TIME
12-Sep-2019 13:53
12-Sep-2019 21:00
18-Sep-2019 10:57
20-Sep-2019
20-Sep-2019 14:03
20-Sep-2019 12:58

## 2019-09-20 NOTE — PROVIDER CONTACT NOTE (OTHER) - SITUATION
pt on VA Ecmo from OR. pt centrally cannulated w/ R groin IABP. pt flows dropped @0545 w/ drop in pressure.

## 2019-09-20 NOTE — PROGRESS NOTE ADULT - SUBJECTIVE AND OBJECTIVE BOX
Chief complaint  Patient is a 64y old  Male who presents with a chief complaint of Chest Pain (20 Sep 2019 18:12)   Review of systems  Patient in bed, looks comfortable, no fever, no hypoglycemia.    Labs and Fingersticks  CAPILLARY BLOOD GLUCOSE  180 (20 Sep 2019 16:00)  162 (20 Sep 2019 15:00)  244 (20 Sep 2019 14:00)  112 (20 Sep 2019 11:00)  120 (20 Sep 2019 09:00)      POCT Blood Glucose.: 244 mg/dL (20 Sep 2019 14:03)  POCT Blood Glucose.: 112 mg/dL (20 Sep 2019 10:55)  POCT Blood Glucose.: 120 mg/dL (20 Sep 2019 09:59)  POCT Blood Glucose.: 117 mg/dL (20 Sep 2019 06:54)  POCT Blood Glucose.: 126 mg/dL (20 Sep 2019 06:20)  POCT Blood Glucose.: 130 mg/dL (20 Sep 2019 04:00)  POCT Blood Glucose.: 139 mg/dL (20 Sep 2019 03:03)      Anion Gap, Serum: 21 <H> (09-20 @ 12:32)  Anion Gap, Serum: 26 <H> (09-20 @ 00:43)  Anion Gap, Serum: 25 <H> (09-19 @ 20:47)  Anion Gap, Serum: 13 (09-19 @ 04:57)      Calcium, Total Serum: 7.4 <L> (09-20 @ 12:32)  Calcium, Total Serum: 7.5 <L> (09-20 @ 00:43)  Calcium, Total Serum: 5.8 <LL> (09-19 @ 20:47)  Calcium, Total Serum: 10.0 (09-19 @ 04:57)  Albumin, Serum: 2.3 <L> (09-20 @ 12:32)  Albumin, Serum: 2.1 <L> (09-20 @ 00:43)  Albumin, Serum: 0.4 <L> (09-19 @ 20:47)  Albumin, Serum: 3.9 (09-19 @ 04:57)    Alanine Aminotransferase (ALT/SGPT): 324 <H> (09-20 @ 12:32)  Alanine Aminotransferase (ALT/SGPT): 59 <H> (09-20 @ 00:43)  Alanine Aminotransferase (ALT/SGPT): 15 (09-19 @ 20:47)  Alanine Aminotransferase (ALT/SGPT): 30 (09-19 @ 04:57)  Alkaline Phosphatase, Serum: 27 <L> (09-20 @ 12:32)  Alkaline Phosphatase, Serum: 26 <L> (09-20 @ 00:43)  Alkaline Phosphatase, Serum: <15 <L> (09-19 @ 20:47)  Alkaline Phosphatase, Serum: 87 (09-19 @ 04:57)  Aspartate Aminotransferase (AST/SGOT): 814 <H> (09-20 @ 12:32)  Aspartate Aminotransferase (AST/SGOT): 157 <H> (09-20 @ 00:43)  Aspartate Aminotransferase (AST/SGOT): 52 <H> (09-19 @ 20:47)  Aspartate Aminotransferase (AST/SGOT): 16 (09-19 @ 04:57)        09-20    149<H>  |  109<H>  |  21  ----------------------------<  235<H>  5.7<H>   |  19<L>  |  1.93<H>    Ca    7.4<L>      20 Sep 2019 12:32  Phos  1.9     09-20  Mg     2.8     09-20    TPro  3.1<L>  /  Alb  2.3<L>  /  TBili  2.4<H>  /  DBili  x   /  AST  814<H>  /  ALT  324<H>  /  AlkPhos  27<L>  09-20                        9.8    6.0   )-----------( 106      ( 20 Sep 2019 18:13 )             27.7     Medications  MEDICATIONS  (STANDING):  cefuroxime  IVPB 1500 milliGRAM(s) IV Intermittent every 8 hours  chlorhexidine 0.12% Liquid 5 milliLiter(s) Oral Mucosa every 4 hours  chlorhexidine 2% Cloths 1 Application(s) Topical <User Schedule>  CRRT Treatment    <Continuous>  dextrose 5%. 1000 milliLiter(s) (15 mL/Hr) IV Continuous <Continuous>  dextrose 50% Injectable 50 milliLiter(s) IV Push every 15 minutes  dextrose 50% Injectable 25 milliLiter(s) IV Push every 15 minutes  DOBUTamine Infusion 5 MICROgram(s)/kG/Min (10.38 mL/Hr) IV Continuous <Continuous>  docusate sodium 100 milliGRAM(s) Oral three times a day  hydrocortisone sodium succinate Injectable 100 milliGRAM(s) IV Push every 6 hours  influenza   Vaccine 0.5 milliLiter(s) IntraMuscular once  insulin regular Infusion 2 Unit(s)/Hr (2 mL/Hr) IV Continuous <Continuous>  metoclopramide Injectable 10 milliGRAM(s) IV Push every 8 hours  mupirocin 2% Ointment 1 Application(s) Both Nostrils two times a day  norepinephrine Infusion 0.05 MICROgram(s)/kG/Min (6.488 mL/Hr) IV Continuous <Continuous>  pantoprazole Infusion 8 mG/Hr (10 mL/Hr) IV Continuous <Continuous>  PrismaSATE Dialysate BK 0 / 3.5 5000 milliLiter(s) (1000 mL/Hr) CRRT <Continuous>  PrismaSOL Filtration BGK 0 / 2.5 5000 milliLiter(s) (400 mL/Hr) CRRT <Continuous>  PrismaSOL Filtration BGK 0 / 2.5 5000 milliLiter(s) (400 mL/Hr) CRRT <Continuous>  propofol Infusion 15 MICROgram(s)/kG/Min (6.228 mL/Hr) IV Continuous <Continuous>  sodium chloride 0.9%. 1000 milliLiter(s) (10 mL/Hr) IV Continuous <Continuous>      Physical Exam  General: Patient comfortable in bed  Vital Signs Last 12 Hrs  T(F): 98.6 (09-20-19 @ 16:00), Max: 98.6 (09-20-19 @ 12:00)  HR: 70 (09-20-19 @ 18:15) (64 - 86)  BP: --  BP(mean): --  RR: 20 (09-20-19 @ 18:15) (12 - 20)  SpO2: 96% (09-20-19 @ 18:15) (95% - 100%)  Neck: No palpable thyroid nodules.  CVS: S1S2, No murmurs  Respiratory: No wheezing, no crepitations  GI: Abdomen soft, bowel sounds positive  Musculoskeletal:  edema lower extremities.   Skin: No skin rashes, no ecchymosis    Diagnostics

## 2019-09-20 NOTE — PROGRESS NOTE ADULT - PROBLEM SELECTOR PLAN 4
Has been smoking for over 40 years. Expresses desire to quit. counselled on smoking cessation  - Nicotine patch
Has been smoking for over 40 years. Expresses desire to quit. counselled on smoking cessation  - Nicotine patch
Considering smoking cessation, FU primary team recommendations
Considering smoking cessation, FU primary team recommendations
Has been smoking for over 40 years. Expresses desire to quit. counselled on smoking cessation  - Nicotine patch
Has been smoking for over 40 years. Expresses desire to quit. counselled on smoking cessation  - Nicotine patch  - Lozenge q3 h PRN for craving
Has been smoking for over 40 years. Expresses desire to quit. counselled on smoking cessation  - Nicotine patch  - Lozenge q3 h PRN for craving

## 2019-09-20 NOTE — CONSULT NOTE ADULT - SUBJECTIVE AND OBJECTIVE BOX
NEUROLOGY CONSULT   HPI: Mr. Monet Lopez is a 64y G man with a PMH of TD2M, HTN, 2ppd smoker who who presented on 09-12-19 with chest pain and HA.  He was admitted for cardiac catha nd underwent CABG x 4 09/19 and had cannulated VA ECMO which was c/b GI bleeding, inability to come off bypass and multiple multi-territorial infarcts as well as L. temporal hemorrhage.  Neurology was consulted for stroke management.  PT was notably hypotensive during procedure.      ROS: Unable to assess as intubated and mental status.     PMH:  Type 2 diabetes mellitus with other neurologic complication, without long-term current use of insulin  Hypertension, unspecified type    Home Medications:  captopril: orally once a day (12 Sep 2019 18:32)  metFORMIN: orally once a day (12 Sep 2019 18:31)    MEDICATIONS  (STANDING):  cefuroxime  IVPB 1500 milliGRAM(s) IV Intermittent every 8 hours  chlorhexidine 0.12% Liquid 5 milliLiter(s) Oral Mucosa every 4 hours  chlorhexidine 2% Cloths 1 Application(s) Topical <User Schedule>  dextrose 5%. 1000 milliLiter(s) (15 mL/Hr) IV Continuous <Continuous>  dextrose 50% Injectable 50 milliLiter(s) IV Push every 15 minutes  dextrose 50% Injectable 25 milliLiter(s) IV Push every 15 minutes  DOBUTamine Infusion 5 MICROgram(s)/kG/Min (10.38 mL/Hr) IV Continuous <Continuous>  docusate sodium 100 milliGRAM(s) Oral three times a day  hydrocortisone sodium succinate Injectable 100 milliGRAM(s) IV Push every 6 hours  influenza   Vaccine 0.5 milliLiter(s) IntraMuscular once  insulin regular Infusion 2 Unit(s)/Hr (2 mL/Hr) IV Continuous <Continuous>  metoclopramide Injectable 10 milliGRAM(s) IV Push every 8 hours  mupirocin 2% Ointment 1 Application(s) Both Nostrils two times a day  norepinephrine Infusion 0.05 MICROgram(s)/kG/Min (6.488 mL/Hr) IV Continuous <Continuous>  pantoprazole Infusion 8 mG/Hr (10 mL/Hr) IV Continuous <Continuous>  propofol Infusion 15 MICROgram(s)/kG/Min (6.228 mL/Hr) IV Continuous <Continuous>  sodium bicarbonate  Injectable 50 milliEquivalent(s) IV Push once  sodium chloride 0.9%. 1000 milliLiter(s) (10 mL/Hr) IV Continuous <Continuous>    ALLERGIES: No Known Allergies    PSH:   H/O elbow surgery  CABG x 4  VA ECMO    Social History: 2 ppd smoker.     FH:  FHx: hyperlipidemia  FHx: hypertension  FH: type 2 diabetes  Family history of breast cancer in sister  FH: coronary artery disease    OBJECTIVE  Vital Signs Last 24 Hrs  T(C): 37 (20 Sep 2019 12:00), Max: 37 (20 Sep 2019 12:00)  T(F): 98.6 (20 Sep 2019 12:00), Max: 98.6 (20 Sep 2019 12:00)  HR: 75 (20 Sep 2019 14:00) (55 - 112)  BP: --  BP(mean): --  RR: 13 (20 Sep 2019 14:00) (12 - 17)  SpO2: 100% (20 Sep 2019 14:00) (69% - 100%)  I&O's Summary    19 Sep 2019 07:01  -  20 Sep 2019 07:00  --------------------------------------------------------  IN: 5950.3 mL / OUT: 4730 mL / NET: 1220.3 mL    20 Sep 2019 07:01  -  20 Sep 2019 14:04  --------------------------------------------------------  IN: 3163.2 mL / OUT: 3415 mL / NET: -251.8 mL    PHYSICAL EXAM:  General: Obese male appears older than stated age in bed. Intubated, on multiple pressors and propofol.      Neurologic:  Mental Status: Intubated, sedated. No response to noxious stimuli.   Cranial Nerves: Pupils reactive equally B/L 3-->2 mm.  B/L absent corneal, vestibulo-occular reflexes.  Negative gag reflex.     Motor: Flaccid tone throughout.  Not moving any extremity.  Does not withdraw to nail bed pressure. Reflexes: 0/4 throughout.  Plantar Responses are mute.  No myoclonus on dorsiflexion.      CBC:                        10.2   6.4   )-----------( 93       ( 20 Sep 2019 11:23 )             28.6       CMP:  09-20    149<H>  |  109<H>  |  21  ----------------------------<  235<H>  5.7<H>   |  19<L>  |  1.93<H>    Ca    7.4<L>      20 Sep 2019 12:32  Phos  1.9     09-20  Mg     2.8     09-20    TPro  3.1<L>  /  Alb  2.3<L>  /  TBili  2.4<H>  /  DBili  x   /  AST  814<H>  /  ALT  324<H>  /  AlkPhos  27<L>  09-20  PT/INR - ( 20 Sep 2019 11:23 )   PT: 20.7 sec;   INR: 1.77 ratio    CARDIAC MARKERS ( 20 Sep 2019 08:12 )  x     / x     / 2923 U/L / x     / x      CARDIAC MARKERS ( 20 Sep 2019 00:43 )  x     / x     / 1162 U/L / x     / x      CARDIAC MARKERS ( 19 Sep 2019 20:47 )  x     / x     / 394 U/L / x     / 38.7 ng/mL  ABG - ( 20 Sep 2019 11:24 )  pH, Arterial: 7.30  pH, Blood: x     /  pCO2: 44    /  pO2: 455   / HCO3: 21    / Base Excess: -4.9  /  SaO2: 100     < from: CT Head No Cont (09.20.19 @ 09:41) >  COMPARISON: Head CT CTA brain and neck 9/16/2019, head CT  9/12/2019,    FINDINGS:    Interval new decreased attenuation, loss of gray-white distinction   throughout the cerebral and cerebral hemispheres including and not   limited to, bilateral cerebelli, (3:10), fernanda, midbrain, brachium ponti,   thalami, (3:22),, bilateral frontal, temporal, and parietal and occipital   lobes. Interval 4 x 2 cm, AP, TR, left temporal, and 7.6 x 7.8 mm, AP,   TR, left occipital intraparenchymal hemorrhage likely hemorrhagic   transformation. Findings concerning forhypoxic ischemic change with   edema, mass effect, loss of gray-white distinction, sulcal effacement,   and hemorrhagic transformation in the left temporal and occipital   regions, no significant midline shift.. Slight effacement of the basal   cisterns. There is atherosclerotic vascular calcification of the carotid   siphons.    Disconjugate orbital gaze with increased size of the superior ophthalmic   veins suggesting increased intracranial pressure.. Endotracheal tube and   orogastric tube in situ with retained secretions in naso and oropharynx,   with mucosal thickening, retention cysts and polyps bubbly secretions,   fluid levels in maxillary, ethmoid, frontal and sphenoid sinuses, with   opacification of the mastoid air cells and middleears. Interval new   increased soft tissue swelling along the face and scalp with extension to   the vertex.    IMPRESSION:    Decreased attenuation, loss of gray-white distinction in the cerebral and   cerebellar hemispheres concerning for extensivehypoxic ischemic change   with left temporal and occipital hemorrhagic transformation, no   significant midline shift.    Disconjugate orbital gaze with increased size of superior ophthalmic   veins suggesting increased intracranial pressure.    Increased diffuse scalp soft tissue swelling along the face and scalp   extending to the vertex. Endotracheal tube and orogastric tube in situ   with retained secretions in naso and oropharynx with opacification and   air-fluid levels in sinuses.    < end of copied text >  ARJUN EDMONDS M.D., ATTENDING RADIOLOGIST  This document has been electronically signed. Sep 13 2019  7:54AM

## 2019-09-20 NOTE — PROGRESS NOTE ADULT - ASSESSMENT
Assessment:  DMT2: 64y Male with DM T2 with hyperglycemia, A1C 9.2%, was on oral meds at home, now on insulin,  blood sugars still running high, no hypoglycemic episode, NPO on IV insulin now.  CAD: CABG, on medications, stable, monitored.  HTN: Controlled,  on antihypertensive medications.          Brandi Correia MD  Cell: [phone]  Office: [phone]

## 2019-09-20 NOTE — PROVIDER CONTACT NOTE (OTHER) - ASSESSMENT
pt has increased amount of bleeding under chest dressing with areas of leakage around the edges of the dressing. pt remaining hypertensive MAP 90's.

## 2019-09-20 NOTE — PROVIDER CONTACT NOTE (OTHER) - SITUATION
pt out of OR with VA ECMO central cannulation and R. groin IABP. upon initial assessment no pulses present distal to popiteal bilaterally.

## 2019-09-20 NOTE — PROVIDER CONTACT NOTE (OTHER) - ASSESSMENT
Unable to doppler B/L DP and PT pulses. B/L LE cool and dusky. + B/L popliteal pulses with doppler. R groin femoral a-line and IABP present.

## 2019-09-20 NOTE — PROVIDER CONTACT NOTE (OTHER) - ASSESSMENT
Upon initial assessment by RNs, pt is unarousable to voice. No movement of extremities noted to command or to pain. Pupils are 1+, fixed, and nonreactive. Pt off sedation at this time.

## 2019-09-20 NOTE — PROVIDER CONTACT NOTE (OTHER) - SITUATION
pt came out of OR on VA Ecmo w/ central cannulation and R groin IABP. OG tube hooked up to low wall suction when pt arrive to unit @2030. upon assessment @2300 pt OG tube output had reached 700cc.

## 2019-09-20 NOTE — CONSULT NOTE ADULT - ASSESSMENT
Assessment:  64y [place] M with TD2M, HTN, 2ppd smoker s/p CABG and ECMO c/b multiple multi-teritorial ischemic infarcts as well as L. IPH likely due to combination of watershed infarcts from hypotension and cardioembolic phenomena.  Pupils reactive, but other brainstem reflexes absent.  Exam limited due to propofol, however when re-tested 15 min after propofol off, no change.  No myoclonic jerks noted on current exam, though prior described jerks could be Kendall Pandya phenomena as result of diffuse anoxic brain injury and no need to treat.      Plan  -PT has devastating infarcts and in context of CABG with significant hemodynamic instability, it is unlikely he will have meaningful recovery.   -Given how hemodynamically unstable he is, BP parameters and medical management supersede stroke BP management.  If PT becomes hemodynamically more stable, tight BP and glycemic control would be advised and can get stability CT scan hemodynamically stable.    -Neurosurgery on board for bleed and noted no surgical intervention.  -Continue medical management per primary team.   -Palliative consult is appropriate.      Refugio Vincent, DO  PGY-2 Neurology Service

## 2019-09-20 NOTE — CONSULT NOTE ADULT - ASSESSMENT
64 year-old Citizen of the [place] male with history of T2DM, HTN sp open heart complicated by CVA and acute blood loss anemia  Hepatitis   Cardiogenic shock  JOSE ROBERTO  Hyperkalemia  DIC?    1 Renal -Will start CVV to help control the potassium.  The issue is the lactate is elevated and he also has hepatitis    2 Heme-Serial CBC at present and mult transfusions;  monitor TRALI    3 CVS-Ecmo    Family is aware of poor prognosis

## 2019-09-20 NOTE — PROGRESS NOTE ADULT - PROBLEM SELECTOR PROBLEM 2
Hypertension, unspecified type
CAD (coronary artery disease)
CAD (coronary artery disease)
Hypertension, unspecified type
Type 2 diabetes mellitus with other neurologic complication, without long-term current use of insulin
Hypertension, unspecified type

## 2019-09-20 NOTE — PROGRESS NOTE ADULT - PROBLEM SELECTOR PROBLEM 3
Type 2 diabetes mellitus with other neurologic complication, without long-term current use of insulin
Hypertension, unspecified type
Hypertension, unspecified type
Type 2 diabetes mellitus with other neurologic complication, without long-term current use of insulin

## 2019-09-20 NOTE — PROVIDER CONTACT NOTE (OTHER) - SITUATION
pt on VA ECMO, centrally cannulated with a right groin IABP. pt has been having a slow increase in the amount of bleeding from the open chest cavity under the dressing

## 2019-09-20 NOTE — PROVIDER CONTACT NOTE (OTHER) - ASSESSMENT
pt OG tube placement confirmed. pt abdomen distended but soft. hypoactive bowel sounds. pt had 1 liquid bowel movement that was rust color. pt H&H monitored. pt given 4u PRBCs. crit stabilized.

## 2019-09-20 NOTE — PROGRESS NOTE ADULT - PROBLEM SELECTOR PROBLEM 4
Smokes 2 packs of cigarettes per day

## 2019-09-20 NOTE — CONSULT NOTE ADULT - SUBJECTIVE AND OBJECTIVE BOX
p ([id number])     HPI:  64 year-old [place] male with history of T2DM, HTN and current 2ppd smoking who presents with 6 days of left sternal chest pain. States he arrived from [facility] on Friday night, when he felt this pain for the first time. After this, it happened about once per day. Each of these times, he says it lasted about 2-3 minutes and was about a 5-6/10 in intensity. Each time, he was not exerting himself, but doing light activity such as bathing or bending over to pick something up. The pain is located at the left sternal border and is non-radiating. This morning, he was sleeping at about 3am and experienced more severe chest pain that woke him up. This lasted about 30 minutes and was 8/10 in intensity, which is what prompted him to come to the hospital. The pain was never associated with shortness of breath, sweating, or nausea. He has noticed a new cough that has been worse in the past week as well. He has never experienced this chest pain before. He takes metformin for diabetes and captopril for hypertension, but does not know the doses and states that he ran out of these of Tuesday.     He complains of a right-sided headache with posterior right neck pain that has been present for two weeks as well. This is intermittent and lasts for 15 minutes.     ED Course: EKG w nonspecific ST and T wave abnormality and possible ectopic atrial rhythm. Troponin 19-->20. Plt 111. Hb 15.5, WBC 9.0, D-dimer 505, INR 1.06, pTT 24.7, Cr 0.94, . CXR shows clear lungs. CT Coronary was performed to r/o PE, showed significant calcification and no pulmonary embolus. Cardiology consulted, admitted for cardiac cath. (12 Sep 2019 16:34)    Patient s/p CABG x4 yesterday, unable to come off bypass, cannulated for VA ECMO, plts down to 6, GI bleeding    Nsgy consulted following morning for CT head findings.      Imaging: Widespread evidence of likely hypoxic infarcts with L temporal hemorrhage.    Exam: Intubated, not sedated, no cough reflex, no gag reflex, no corneal reflex, no vestibulo-occular reflex    --Anticoagulation:    =====================  PAST MEDICAL HISTORY   Type 2 diabetes mellitus with other neurologic complication, without long-term current use of insulin  Hypertension, unspecified type    PAST SURGICAL HISTORY   H/O elbow surgery  No significant past surgical history        MEDICATIONS:  Antibiotics:  cefuroxime  IVPB 1500 milliGRAM(s) IV Intermittent every 8 hours    Neuro:  metoclopramide Injectable 10 milliGRAM(s) IV Push every 8 hours  propofol Infusion 15 MICROgram(s)/kG/Min IV Continuous <Continuous>    Other:  dextrose 5%. 1000 milliLiter(s) IV Continuous <Continuous>  dextrose 50% Injectable 50 milliLiter(s) IV Push every 15 minutes  dextrose 50% Injectable 25 milliLiter(s) IV Push every 15 minutes  dextrose 50% Injectable 50 milliLiter(s) IV Push once  DOBUTamine Infusion 5 MICROgram(s)/kG/Min IV Continuous <Continuous>  docusate sodium 100 milliGRAM(s) Oral three times a day  hydrocortisone sodium succinate Injectable 100 milliGRAM(s) IV Push every 6 hours  influenza   Vaccine 0.5 milliLiter(s) IntraMuscular once  insulin regular  human recombinant. 10 Unit(s) IV Push once  insulin regular Infusion 2 Unit(s)/Hr IV Continuous <Continuous>  norepinephrine Infusion 0.05 MICROgram(s)/kG/Min IV Continuous <Continuous>  pantoprazole Infusion 8 mG/Hr IV Continuous <Continuous>  sodium chloride 0.9%. 1000 milliLiter(s) IV Continuous <Continuous>      SOCIAL HISTORY:   Occupation:   Marital Status:     FAMILY HISTORY:  FHx: hyperlipidemia  FHx: hypertension  FH: type 2 diabetes  Family history of breast cancer in sister  FH: coronary artery disease      ROS: Negative except per HPI    LABS:  PT/INR - ( 20 Sep 2019 11:23 )   PT: 20.7 sec;   INR: 1.77 ratio         PTT - ( 20 Sep 2019 11:23 )  PTT:>200.0 sec                        10.2   6.4   )-----------( 93       ( 20 Sep 2019 11:23 )             28.6     09-20    149<H>  |  105  |  16  ----------------------------<  167<H>  3.4<L>   |  18<L>  |  1.41<H>    Ca    7.5<L>      20 Sep 2019 00:43  Phos  1.9     09-20  Mg     2.8     09-20    TPro  3.4<L>  /  Alb  2.1<L>  /  TBili  2.0<H>  /  DBili  x   /  AST  157<H>  /  ALT  59<H>  /  AlkPhos  26<L>  09-20

## 2019-09-20 NOTE — PROGRESS NOTE ADULT - SUBJECTIVE AND OBJECTIVE BOX
[facility]  MRN-[id number]  Patient is a 64y old  Male who presents with a chief complaint of Chest Pain (20 Sep 2019 17:07)    HPI:  64 year-old [place] male with history of T2DM, HTN and current 2ppd smoking who presents with 6 days of left sternal chest pain. States he arrived from [facility] on Friday night, when he felt this pain for the first time. After this, it happened about once per day. Each of these times, he says it lasted about 2-3 minutes and was about a 5-6/10 in intensity. Each time, he was not exerting himself, but doing light activity such as bathing or bending over to pick something up. The pain is located at the left sternal border and is non-radiating. This morning, he was sleeping at about 3am and experienced more severe chest pain that woke him up. This lasted about 30 minutes and was 8/10 in intensity, which is what prompted him to come to the hospital. The pain was never associated with shortness of breath, sweating, or nausea. He has noticed a new cough that has been worse in the past week as well. He has never experienced this chest pain before. He takes metformin for diabetes and captopril for hypertension, but does not know the doses and states that he ran out of these of Tuesday.     He complains of a right-sided headache with posterior right neck pain that has been present for two weeks as well. This is intermittent and lasts for 15 minutes.     ED Course: EKG w nonspecific ST and T wave abnormality and possible ectopic atrial rhythm. Troponin 19-->20. Plt 111. Hb 15.5, WBC 9.0, D-dimer 505, INR 1.06, pTT 24.7, Cr 0.94, . CXR shows clear lungs. CT Coronary was performed to r/o PE, showed significant calcification and no pulmonary embolus. Cardiology consulted, admitted for cardiac cath. (12 Sep 2019 16:34)      Surgery/Hospital course:    Today:    REVIEW OF SYSTEMS:  Gen: No fever  EYES/ENT: No visual changes;  No vertigo or throat pain   NECK: No pain   RES:  No shortness of breath or Cough  Chest: + incisional pain  CARD: No chest pain   GI: No abdominal pain  : No dysuria  NEURO: No weakness  SKIN: No itching, rashes     Physical Exam:  Vital Signs Last 24 Hrs  T(C): 37 (20 Sep 2019 16:00), Max: 37 (20 Sep 2019 12:00)  T(F): 98.6 (20 Sep 2019 16:00), Max: 98.6 (20 Sep 2019 12:00)  HR: 65 (20 Sep 2019 18:00) (55 - 112)  BP: --  BP(mean): --  RR: 20 (20 Sep 2019 18:00) (12 - 20)  SpO2: 100% (20 Sep 2019 18:00) (69% - 100%)  Gen:  Awake, alert   CNS: non focal 	  Neck: no JVD  RES : clear , no wheezing    Chest:   + chest tubes                     CVS: Regular  rhythm. Normal S1/S2  Abd: Soft, non-distended. Bowel sounds present.  Skin: No rash.  Ext:  no edema, A Line  PSY:  ============================I/O===========================   I&O's Detail    19 Sep 2019 07:01  -  20 Sep 2019 07:00  --------------------------------------------------------  IN:    Albumin 5%  - 250 mL: 1250 mL    Cryoprecipitate: 225 mL    dexmedetomidine Infusion: 77.7 mL    DOBUTamine Infusion: 114.4 mL    insulin regular Infusion: 27 mL    IV PiggyBack: 600 mL    niCARdipine Infusion: 235 mL    norepinephrine Infusion: 37.2 mL    Packed Red Blood Cells: 1400 mL    pantoprazole Infusion: 80 mL    Plasma: 500 mL    Platelets - Single Donor: 900 mL    propofol Infusion: 34 mL    sodium chloride 0.9%.: 120 mL    Solution: 350 mL  Total IN: 5950.3 mL    OUT:    Chest Tube: 740 mL    Chest Tube: 390 mL    Indwelling Catheter - Urethral: 2700 mL    Nasoenteral Tube: 900 mL  Total OUT: 4730 mL    Total NET: 1220.3 mL      20 Sep 2019 07:01  -  20 Sep 2019 18:13  --------------------------------------------------------  IN:    Albumin 5%  - 250 mL: 500 mL    Cryoprecipitate: 150 mL    DOBUTamine Infusion: 114.4 mL    FEIBA VH: 20 mL    insulin regular Infusion: 22 mL    IV PiggyBack: 50 mL    norepinephrine Infusion: 41.6 mL    Packed Red Blood Cells: 2450 mL    pantoprazole Infusion: 110 mL    Plasma: 1500 mL    Platelets - Single Donor: 600 mL    propofol Infusion: 74.6 mL    Sodium Chloride 0.9% IV Bolus: 500 mL    sodium chloride 0.9%.: 110 mL    Solution: 150 mL  Total IN: 6392.6 mL    OUT:    Chest Tube: 2600 mL    Chest Tube: 160 mL    Indwelling Catheter - Urethral: 1855 mL    Nasoenteral Tube: 800 mL  Total OUT: 5415 mL    Total NET: 977.6 mL        ============================ LABS =========================                        10.2   6.4   )-----------( 93       ( 20 Sep 2019 11:23 )             28.6     09-20    149<H>  |  109<H>  |  21  ----------------------------<  235<H>  5.7<H>   |  19<L>  |  1.93<H>    Ca    7.4<L>      20 Sep 2019 12:32  Phos  1.9     09-20  Mg     2.8     09-20    TPro  3.1<L>  /  Alb  2.3<L>  /  TBili  2.4<H>  /  DBili  x   /  AST  814<H>  /  ALT  324<H>  /  AlkPhos  27<L>  09-20    LIVER FUNCTIONS - ( 20 Sep 2019 12:32 )  Alb: 2.3 g/dL / Pro: 3.1 g/dL / ALK PHOS: 27 U/L / ALT: 324 U/L / AST: 814 U/L / GGT: x           PT/INR - ( 20 Sep 2019 11:23 )   PT: 20.7 sec;   INR: 1.77 ratio         PTT - ( 20 Sep 2019 12:32 )  PTT:>200 sec  ABG - ( 20 Sep 2019 18:03 )  pH, Arterial: 7.31  pH, Blood: x     /  pCO2: 39    /  pO2: 424   / HCO3: 19    / Base Excess: -6.2  /  SaO2: 100                 ======================Micro/Rad/Cardio=================  Culture: Reviewed   CXR: Reviewed  Echo:Reviewed  ======================================================  PAST MEDICAL & SURGICAL HISTORY:  Type 2 diabetes mellitus with other neurologic complication, without long-term current use of insulin  Hypertension, unspecified type  H/O elbow surgery: Right elbow surgery 2007    ====================ASSESMENT ==============  CNS:  RES:  CVS:  Hem:  Jonathon:  GI:  Endo:  ID:  Skin  Plan:  ====================== NEUROLOGY=====================  metoclopramide Injectable 10 milliGRAM(s) IV Push every 8 hours  propofol Infusion 15 MICROgram(s)/kG/Min (6.228 mL/Hr) IV Continuous <Continuous>    ==================== RESPIRATORY======================  Mechanical Ventilation:  Mode: AC/ CMV (Assist Control/ Continuous Mandatory Ventilation)  RR (machine): 20  TV (machine): 550  FiO2: 50  PEEP: 5  ITime: 1  MAP: 16  PIP: 36      ====================CARDIOVASCULAR==================  DOBUTamine Infusion 5 MICROgram(s)/kG/Min (10.38 mL/Hr) IV Continuous <Continuous>  norepinephrine Infusion 0.05 MICROgram(s)/kG/Min (6.488 mL/Hr) IV Continuous <Continuous>    ===================HEMATOLOGIC/ONC ===================    ===================== RENAL =========================  Garcia for monitoring urine output    ==================== GASTROINTESTINAL===================  dextrose 5%. 1000 milliLiter(s) (15 mL/Hr) IV Continuous <Continuous>  docusate sodium 100 milliGRAM(s) Oral three times a day  pantoprazole Infusion 8 mG/Hr (10 mL/Hr) IV Continuous <Continuous>  sodium chloride 0.9%. 1000 milliLiter(s) (10 mL/Hr) IV Continuous <Continuous>    =======================    ENDOCRINE  =====================  dextrose 50% Injectable 50 milliLiter(s) IV Push every 15 minutes  dextrose 50% Injectable 25 milliLiter(s) IV Push every 15 minutes  hydrocortisone sodium succinate Injectable 100 milliGRAM(s) IV Push every 6 hours  insulin regular Infusion 2 Unit(s)/Hr (2 mL/Hr) IV Continuous <Continuous>    ========================INFECTIOUS DISEASE================  cefuroxime  IVPB 1500 milliGRAM(s) IV Intermittent every 8 hours    Patient requires continuous monitoring with bedside rhythm monitoring,arterial line,pulse oximetry,ventilator monitoring;intermittent blood gas analysis.  Care plan discussed with ICU care team.  patient remain critical; required more than usual post op care; I have spent 35 minutes providing non routine post op care. [facility]  MRN-[id number]  Patient is a 64y old  Male who presents with a chief complaint of Chest Pain (20 Sep 2019 17:07)    HPI:  64 year-old [place] male with history of T2DM, HTN and current 2ppd smoking who presents with 6 days of left sternal chest pain. States he arrived from [facility] on Friday night, when he felt this pain for the first time. After this, it happened about once per day. Each of these times, he says it lasted about 2-3 minutes and was about a 5-6/10 in intensity. Each time, he was not exerting himself, but doing light activity such as bathing or bending over to pick something up. The pain is located at the left sternal border and is non-radiating. This morning, he was sleeping at about 3am and experienced more severe chest pain that woke him up. This lasted about 30 minutes and was 8/10 in intensity, which is what prompted him to come to the hospital. The pain was never associated with shortness of breath, sweating, or nausea. He has noticed a new cough that has been worse in the past week as well. He has never experienced this chest pain before. He takes metformin for diabetes and captopril for hypertension, but does not know the doses and states that he ran out of these of Tuesday.     He complains of a right-sided headache with posterior right neck pain that has been present for two weeks as well. This is intermittent and lasts for 15 minutes.     ED Course: EKG w nonspecific ST and T wave abnormality and possible ectopic atrial rhythm. Troponin 19-->20. Plt 111. Hb 15.5, WBC 9.0, D-dimer 505, INR 1.06, pTT 24.7, Cr 0.94, . CXR shows clear lungs. CT Coronary was performed to r/o PE, showed significant calcification and no pulmonary embolus. Cardiology consulted, admitted for cardiac cath. (12 Sep 2019 16:34)      Surgery/Hospital course:  9/19/2019 C4 L, IABP, central ECMO , open chest  pt remain unresponsive. full vent support, VA ECMO with good flow.  had facial twitching, head ct showed bleed and multiple infarcts; started on diprivan to control siezures.  pt starte also bleeding from medastinum, with coagulopathy.   given multiple blood and blood products.  Today:    Physical Exam:  Vital Signs Last 24 Hrs  T(C): 37 (20 Sep 2019 16:00), Max: 37 (20 Sep 2019 12:00)  T(F): 98.6 (20 Sep 2019 16:00), Max: 98.6 (20 Sep 2019 12:00)  HR: 65 (20 Sep 2019 18:00) (55 - 112)  BP: --  BP(mean): --  RR: 20 (20 Sep 2019 18:00) (12 - 20)  SpO2: 100% (20 Sep 2019 18:00) (69% - 100%)  Gen: vent support  CNS: coma, pupils reactive  Neck: no JVD  RES : clear , no wheezing    Chest: open,   + chest tubes                     CVS: Regular  rhythm. Normal S1/S2  Abd: Soft, non-distended. Bowel sounds negative    ============================I/O===========================   I&O's Detail    19 Sep 2019 07:01  -  20 Sep 2019 07:00  --------------------------------------------------------  IN:    Albumin 5%  - 250 mL: 1250 mL    Cryoprecipitate: 225 mL    dexmedetomidine Infusion: 77.7 mL    DOBUTamine Infusion: 114.4 mL    insulin regular Infusion: 27 mL    IV PiggyBack: 600 mL    niCARdipine Infusion: 235 mL    norepinephrine Infusion: 37.2 mL    Packed Red Blood Cells: 1400 mL    pantoprazole Infusion: 80 mL    Plasma: 500 mL    Platelets - Single Donor: 900 mL    propofol Infusion: 34 mL    sodium chloride 0.9%.: 120 mL    Solution: 350 mL  Total IN: 5950.3 mL    OUT:    Chest Tube: 740 mL    Chest Tube: 390 mL    Indwelling Catheter - Urethral: 2700 mL    Nasoenteral Tube: 900 mL  Total OUT: 4730 mL    Total NET: 1220.3 mL      20 Sep 2019 07:01  -  20 Sep 2019 18:13  --------------------------------------------------------  IN:    Albumin 5%  - 250 mL: 500 mL    Cryoprecipitate: 150 mL    DOBUTamine Infusion: 114.4 mL    FEIBA VH: 20 mL    insulin regular Infusion: 22 mL    IV PiggyBack: 50 mL    norepinephrine Infusion: 41.6 mL    Packed Red Blood Cells: 2450 mL    pantoprazole Infusion: 110 mL    Plasma: 1500 mL    Platelets - Single Donor: 600 mL    propofol Infusion: 74.6 mL    Sodium Chloride 0.9% IV Bolus: 500 mL    sodium chloride 0.9%.: 110 mL    Solution: 150 mL  Total IN: 6392.6 mL    OUT:    Chest Tube: 2600 mL    Chest Tube: 160 mL    Indwelling Catheter - Urethral: 1855 mL    Nasoenteral Tube: 800 mL  Total OUT: 5415 mL    Total NET: 977.6 mL        ============================ LABS =========================                        10.2   6.4   )-----------( 93       ( 20 Sep 2019 11:23 )             28.6     09-20    149<H>  |  109<H>  |  21  ----------------------------<  235<H>  5.7<H>   |  19<L>  |  1.93<H>    Ca    7.4<L>      20 Sep 2019 12:32  Phos  1.9     09-20  Mg     2.8     09-20    TPro  3.1<L>  /  Alb  2.3<L>  /  TBili  2.4<H>  /  DBili  x   /  AST  814<H>  /  ALT  324<H>  /  AlkPhos  27<L>  09-20    LIVER FUNCTIONS - ( 20 Sep 2019 12:32 )  Alb: 2.3 g/dL / Pro: 3.1 g/dL / ALK PHOS: 27 U/L / ALT: 324 U/L / AST: 814 U/L / GGT: x           PT/INR - ( 20 Sep 2019 11:23 )   PT: 20.7 sec;   INR: 1.77 ratio         PTT - ( 20 Sep 2019 12:32 )  PTT:>200 sec  ABG - ( 20 Sep 2019 18:03 )  pH, Arterial: 7.31  pH, Blood: x     /  pCO2: 39    /  pO2: 424   / HCO3: 19    / Base Excess: -6.2  /  SaO2: 100         CXR: Reviewed  Echo:Reviewed  ======================================================  PAST MEDICAL & SURGICAL HISTORY:  Type 2 diabetes mellitus with other neurologic complication, without long-term current use of insulin  Hypertension, unspecified type  H/O elbow surgery: Right elbow surgery 2007    ====================ASSESMENT ==============  acute MI /NSTEMI, ASHD/CAD  9/19/2019 C4 L, IABP, central ECMO , open chest  cardiogenic shock   hemorrhagic shock  acute blood loss anemia  Hyperkalemia  acute renal failure  DM2  smoker                                                                                                                                                                                                                                                                                 Plan:  ====================== NEUROLOGY=====================  metoclopramide Injectable 10 milliGRAM(s) IV Push every 8 hours  propofol Infusion 15 MICROgram(s)/kG/Min (6.228 mL/Hr) IV Continuous <Continuous>    ==================== RESPIRATORY======================  Mechanical Ventilation:  Mode: AC/ CMV (Assist Control/ Continuous Mandatory Ventilation)  RR (machine): 20  TV (machine): 550  FiO2: 50  PEEP: 5      ====================CARDIOVASCULAR==================  VA Ecmo cental cannulation  DOBUTamine Infusion 5 MICROgram(s)/kG/Min (10.38 mL/Hr) IV Continuous <Continuous>  norepinephrine Infusion 0.05 MICROgram(s)/kG/Min (6.488 mL/Hr) IV Continuous <Continuous>    ===================HEMATOLOGIC/ONC ===================  transfusion ( blood, FFP, plts, cryo)  ===================== RENAL =========================  Garcia for monitoring urine output  cvvkd fluid remaonva a stolerated  ==================== GASTROINTESTINAL===================  dextrose 5%. 1000 milliLiter(s) (15 mL/Hr) IV Continuous <Continuous>  docusate sodium 100 milliGRAM(s) Oral three times a day  pantoprazole Infusion 8 mG/Hr (10 mL/Hr) IV Continuous <Continuous>  sodium chloride 0.9%. 1000 milliLiter(s) (10 mL/Hr) IV Continuous <Continuous>    =======================    ENDOCRINE  =====================  hydrocortisone sodium succinate Injectable 100 milliGRAM(s) IV Push every 6 hours  insulin regular Infusion 2 Unit(s)/Hr (2 mL/Hr) IV Continuous <Continuous>    ========================INFECTIOUS DISEASE================  cefuroxime  IVPB 1500 milliGRAM(s) IV Intermittent every 8 hours    Patient requires continuous monitoring with bedside rhythm monitoring,arterial line,pulse oximetry,ventilator monitoring;intermittent blood gas analysis.  Care plan discussed with ICU care team.  patient remain critical; required more than usual post op care; I have spent 45 minutes providing non routine post op care.

## 2019-09-20 NOTE — CONSULT NOTE ADULT - ASSESSMENT
64M s/p CABG x4 yesterday, unable to come off bypass, cannulated for VA ECMO, plts down to 6, GI bleeding, CT head obtained following morning.  - No surgical intervention  - Given severity of hypoxic damage and exam, no need for further imaging

## 2019-09-21 VITALS — RESPIRATION RATE: 20 BRPM | OXYGEN SATURATION: 91 % | HEART RATE: 72 BPM

## 2019-09-21 LAB
ALBUMIN SERPL ELPH-MCNC: 2.2 G/DL — LOW (ref 3.3–5)
ALP SERPL-CCNC: 53 U/L — SIGNIFICANT CHANGE UP (ref 40–120)
ALT FLD-CCNC: 546 U/L — HIGH (ref 10–45)
ANION GAP SERPL CALC-SCNC: 19 MMOL/L — HIGH (ref 5–17)
APTT BLD: 55.1 SEC — HIGH (ref 27.5–36.3)
AST SERPL-CCNC: 1504 U/L — HIGH (ref 10–40)
BILIRUB SERPL-MCNC: 2.8 MG/DL — HIGH (ref 0.2–1.2)
BLD GP AB SCN SERPL QL: NEGATIVE — SIGNIFICANT CHANGE UP
BUN SERPL-MCNC: 21 MG/DL — SIGNIFICANT CHANGE UP (ref 7–23)
CALCIUM SERPL-MCNC: 8 MG/DL — LOW (ref 8.4–10.5)
CHLORIDE SERPL-SCNC: 108 MMOL/L — SIGNIFICANT CHANGE UP (ref 96–108)
CK SERPL-CCNC: CRITICAL HIGH U/L (ref 30–200)
CK SERPL-CCNC: CRITICAL HIGH U/L (ref 30–200)
CO2 SERPL-SCNC: 19 MMOL/L — LOW (ref 22–31)
CREAT SERPL-MCNC: 1.81 MG/DL — HIGH (ref 0.5–1.3)
GAS PNL BLDA: SIGNIFICANT CHANGE UP
GLUCOSE BLDC GLUCOMTR-MCNC: 109 MG/DL — HIGH (ref 70–99)
GLUCOSE BLDC GLUCOMTR-MCNC: 123 MG/DL — HIGH (ref 70–99)
GLUCOSE BLDC GLUCOMTR-MCNC: 182 MG/DL — HIGH (ref 70–99)
GLUCOSE BLDC GLUCOMTR-MCNC: 183 MG/DL — HIGH (ref 70–99)
GLUCOSE BLDC GLUCOMTR-MCNC: 67 MG/DL — LOW (ref 70–99)
GLUCOSE BLDC GLUCOMTR-MCNC: 74 MG/DL — SIGNIFICANT CHANGE UP (ref 70–99)
GLUCOSE BLDC GLUCOMTR-MCNC: 77 MG/DL — SIGNIFICANT CHANGE UP (ref 70–99)
GLUCOSE BLDC GLUCOMTR-MCNC: 78 MG/DL — SIGNIFICANT CHANGE UP (ref 70–99)
GLUCOSE BLDC GLUCOMTR-MCNC: 89 MG/DL — SIGNIFICANT CHANGE UP (ref 70–99)
GLUCOSE SERPL-MCNC: 77 MG/DL — SIGNIFICANT CHANGE UP (ref 70–99)
HCT VFR BLD CALC: 25.4 % — LOW (ref 39–50)
HGB BLD-MCNC: 8.7 G/DL — LOW (ref 13–17)
INR BLD: 1.4 RATIO — HIGH (ref 0.88–1.16)
LDH SERPL L TO P-CCNC: >2250 U/L — HIGH (ref 50–242)
MAGNESIUM SERPL-MCNC: 2.3 MG/DL — SIGNIFICANT CHANGE UP (ref 1.6–2.6)
MCHC RBC-ENTMCNC: 30.9 PG — SIGNIFICANT CHANGE UP (ref 27–34)
MCHC RBC-ENTMCNC: 34 GM/DL — SIGNIFICANT CHANGE UP (ref 32–36)
MCV RBC AUTO: 90.9 FL — SIGNIFICANT CHANGE UP (ref 80–100)
PHOSPHATE SERPL-MCNC: 7.5 MG/DL — HIGH (ref 2.5–4.5)
PLATELET # BLD AUTO: 94 K/UL — LOW (ref 150–400)
POTASSIUM SERPL-MCNC: 6.6 MMOL/L — CRITICAL HIGH (ref 3.5–5.3)
POTASSIUM SERPL-SCNC: 6.6 MMOL/L — CRITICAL HIGH (ref 3.5–5.3)
PROT SERPL-MCNC: 3.6 G/DL — LOW (ref 6–8.3)
PROTHROM AB SERPL-ACNC: 16.3 SEC — HIGH (ref 10–12.9)
RBC # BLD: 2.8 M/UL — LOW (ref 4.2–5.8)
RBC # FLD: 13.2 % — SIGNIFICANT CHANGE UP (ref 10.3–14.5)
RH IG SCN BLD-IMP: POSITIVE — SIGNIFICANT CHANGE UP
SODIUM SERPL-SCNC: 146 MMOL/L — HIGH (ref 135–145)
WBC # BLD: 5.1 K/UL — SIGNIFICANT CHANGE UP (ref 3.8–10.5)
WBC # FLD AUTO: 5.1 K/UL — SIGNIFICANT CHANGE UP (ref 3.8–10.5)

## 2019-09-21 PROCEDURE — 71045 X-RAY EXAM CHEST 1 VIEW: CPT | Mod: 26

## 2019-09-21 PROCEDURE — 99291 CRITICAL CARE FIRST HOUR: CPT

## 2019-09-21 RX ORDER — DEXTROSE 50 % IN WATER 50 %
50 SYRINGE (ML) INTRAVENOUS ONCE
Refills: 0 | Status: COMPLETED | OUTPATIENT
Start: 2019-09-21 | End: 2019-09-21

## 2019-09-21 RX ORDER — SODIUM BICARBONATE 1 MEQ/ML
50 SYRINGE (ML) INTRAVENOUS ONCE
Refills: 0 | Status: COMPLETED | OUTPATIENT
Start: 2019-09-21 | End: 2019-09-21

## 2019-09-21 RX ORDER — DEXTROSE 50 % IN WATER 50 %
50 SYRINGE (ML) INTRAVENOUS
Refills: 0 | Status: COMPLETED | OUTPATIENT
Start: 2019-09-21 | End: 2019-09-21

## 2019-09-21 RX ORDER — DEXTROSE 10 % IN WATER 10 %
1000 INTRAVENOUS SOLUTION INTRAVENOUS
Refills: 0 | Status: DISCONTINUED | OUTPATIENT
Start: 2019-09-21 | End: 2019-09-21

## 2019-09-21 RX ORDER — SODIUM CHLORIDE 9 MG/ML
1000 INJECTION, SOLUTION INTRAVENOUS
Refills: 0 | Status: DISCONTINUED | OUTPATIENT
Start: 2019-09-21 | End: 2019-09-21

## 2019-09-21 RX ORDER — ALBUMIN HUMAN 25 %
250 VIAL (ML) INTRAVENOUS ONCE
Refills: 0 | Status: COMPLETED | OUTPATIENT
Start: 2019-09-21 | End: 2019-09-21

## 2019-09-21 RX ORDER — VASOPRESSIN 20 [USP'U]/ML
0.1 INJECTION INTRAVENOUS
Qty: 50 | Refills: 0 | Status: DISCONTINUED | OUTPATIENT
Start: 2019-09-21 | End: 2019-09-21

## 2019-09-21 RX ORDER — CALCIUM CHLORIDE
1000 POWDER (GRAM) MISCELLANEOUS ONCE
Refills: 0 | Status: COMPLETED | OUTPATIENT
Start: 2019-09-21 | End: 2019-09-21

## 2019-09-21 RX ADMIN — PANTOPRAZOLE SODIUM 10 MG/HR: 20 TABLET, DELAYED RELEASE ORAL at 05:08

## 2019-09-21 RX ADMIN — SODIUM CHLORIDE 30 MILLILITER(S): 9 INJECTION, SOLUTION INTRAVENOUS at 02:00

## 2019-09-21 RX ADMIN — Medication 50 MILLIEQUIVALENT(S): at 10:56

## 2019-09-21 RX ADMIN — Medication 6.49 MICROGRAM(S)/KG/MIN: at 03:00

## 2019-09-21 RX ADMIN — Medication 125 MILLILITER(S): at 05:10

## 2019-09-21 RX ADMIN — Medication 50 MILLIEQUIVALENT(S): at 10:55

## 2019-09-21 RX ADMIN — Medication 100 MILLIGRAM(S): at 00:01

## 2019-09-21 RX ADMIN — CHLORHEXIDINE GLUCONATE 5 MILLILITER(S): 213 SOLUTION TOPICAL at 05:03

## 2019-09-21 RX ADMIN — Medication 1000 MILLIGRAM(S): at 10:55

## 2019-09-21 RX ADMIN — CHLORHEXIDINE GLUCONATE 5 MILLILITER(S): 213 SOLUTION TOPICAL at 01:40

## 2019-09-21 RX ADMIN — VASOPRESSIN 6 UNIT(S)/MIN: 20 INJECTION INTRAVENOUS at 03:30

## 2019-09-21 RX ADMIN — Medication 100 MILLIGRAM(S): at 05:17

## 2019-09-21 RX ADMIN — MUPIROCIN 1 APPLICATION(S): 20 OINTMENT TOPICAL at 05:03

## 2019-09-21 RX ADMIN — Medication 50 MILLILITER(S): at 10:55

## 2019-09-21 RX ADMIN — CHLORHEXIDINE GLUCONATE 1 APPLICATION(S): 213 SOLUTION TOPICAL at 05:11

## 2019-09-21 RX ADMIN — Medication 50 MILLILITER(S): at 01:49

## 2019-09-21 RX ADMIN — CHLORHEXIDINE GLUCONATE 5 MILLILITER(S): 213 SOLUTION TOPICAL at 10:43

## 2019-09-21 RX ADMIN — PROPOFOL 6.23 MICROGRAM(S)/KG/MIN: 10 INJECTION, EMULSION INTRAVENOUS at 05:07

## 2019-09-21 RX ADMIN — Medication 10.38 MICROGRAM(S)/KG/MIN: at 05:08

## 2019-09-21 RX ADMIN — Medication 10 MILLIGRAM(S): at 05:16

## 2019-09-21 RX ADMIN — Medication 50 MILLIEQUIVALENT(S): at 04:45

## 2019-09-21 NOTE — PROVIDER CONTACT NOTE (OTHER) - SITUATION
pt rhythm undeterminable on monitor. HR on balloon pump 51 but balloon pump inappropriately augmenting.

## 2019-09-21 NOTE — PROVIDER CONTACT NOTE (OTHER) - RECOMMENDATIONS
As per providers
As per provider
As per provider
As per providers
Cardene shut off and fluid started via rapid infuser.
Give AM lopressor.
Pt h&h to be monitored closely. start protonix drip d/t suspicion of GI bleed.
SE [place] at bedside. pacing initiated through pacing wires.
continue to monitor and manage blood pressure for MAP 70-90's and continue to monitor chest tube output
pt warmed through ECMO circuit, hot pack applied to extremities. leg O2 saturation monitored, pulses assessed Q1 hr
Assess patient. Review patient's orders, labs, history & plan.

## 2019-09-21 NOTE — PROGRESS NOTE ADULT - SUBJECTIVE AND OBJECTIVE BOX
NEPHROLOGY-[facility] [phone]        Patient seen and examined in bed.  Still with AIBP and ecmo   No pressors         MEDICATIONS  (STANDING):  cefuroxime  IVPB 1500 milliGRAM(s) IV Intermittent every 8 hours  chlorhexidine 0.12% Liquid 5 milliLiter(s) Oral Mucosa every 4 hours  chlorhexidine 2% Cloths 1 Application(s) Topical <User Schedule>  CRRT Treatment    <Continuous>  dextrose 5%. 1000 milliLiter(s) (15 mL/Hr) IV Continuous <Continuous>  dextrose 5%. 1000 milliLiter(s) (30 mL/Hr) IV Continuous <Continuous>  dextrose 50% Injectable 50 milliLiter(s) IV Push every 15 minutes  dextrose 50% Injectable 25 milliLiter(s) IV Push every 15 minutes  DOBUTamine Infusion 5 MICROgram(s)/kG/Min (10.38 mL/Hr) IV Continuous <Continuous>  docusate sodium 100 milliGRAM(s) Oral three times a day  hydrocortisone sodium succinate Injectable 100 milliGRAM(s) IV Push every 6 hours  influenza   Vaccine 0.5 milliLiter(s) IntraMuscular once  insulin regular Infusion 2 Unit(s)/Hr (2 mL/Hr) IV Continuous <Continuous>  metoclopramide Injectable 10 milliGRAM(s) IV Push every 8 hours  mupirocin 2% Ointment 1 Application(s) Both Nostrils two times a day  norepinephrine Infusion 0.05 MICROgram(s)/kG/Min (6.488 mL/Hr) IV Continuous <Continuous>  pantoprazole Infusion 8 mG/Hr (10 mL/Hr) IV Continuous <Continuous>  PrismaSATE Dialysate BK 0 / 3.5 5000 milliLiter(s) (1000 mL/Hr) CRRT <Continuous>  PrismaSOL Filtration BGK 0 / 2.5 5000 milliLiter(s) (400 mL/Hr) CRRT <Continuous>  PrismaSOL Filtration BGK 0 / 2.5 5000 milliLiter(s) (400 mL/Hr) CRRT <Continuous>  propofol Infusion 15 MICROgram(s)/kG/Min (6.228 mL/Hr) IV Continuous <Continuous>  sodium chloride 0.9%. 1000 milliLiter(s) (10 mL/Hr) IV Continuous <Continuous>  vasopressin Infusion 0.1 Unit(s)/Min (6 mL/Hr) IV Continuous <Continuous>      VITAL:  T(C): , Max: 37 (09-20-19 @ 12:00)  T(F): , Max: 98.6 (09-20-19 @ 12:00)  HR: 64 (09-21-19 @ 08:33)  BP: --  BP(mean): --  RR: 20 (09-21-19 @ 07:30)  SpO2: 88% (09-21-19 @ 08:33)  Wt(kg): --    I and O's:    09-20 @ 07:01  -  09-21 @ 07:00  --------------------------------------------------------  IN: 7627.2 mL / OUT: 8145 mL / NET: -517.8 mL    09-21 @ 07:01  -  09-21 @ 08:38  --------------------------------------------------------  IN: 58.7 mL / OUT: 175 mL / NET: -116.3 mL          PHYSICAL EXAM:    Constitutional: intubated  Neck:  No JVD  Respiratory: Course  Cardiovascular: S1 and S2 open chest   Gastrointestinal: hypoactive   Extremities: No peripheral edema  Neurological: unable   : +  Garcia  Skin: No rashes  Access: Not applicable    LABS:                        8.7    5.1   )-----------( 94       ( 21 Sep 2019 01:04 )             25.4     09-21    146<H>  |  108  |  21  ----------------------------<  77  6.6<HH>   |  19<L>  |  1.81<H>    Ca    8.0<L>      21 Sep 2019 01:04  Phos  7.5     09-21  Mg     2.3     09-21    TPro  3.6<L>  /  Alb  2.2<L>  /  TBili  2.8<H>  /  DBili  x   /  AST  1504<H>  /  ALT  546<H>  /  AlkPhos  53  09-21          Urine Studies:          RADIOLOGY & ADDITIONAL STUDIES:  < from: Xray Chest 1 View- PORTABLE-Urgent (09.20.19 @ 14:20) >    EXAM:  XR CHEST PORTABLE URGENT 1V                            PROCEDURE DATE:  09/20/2019            INTERPRETATION:  A single chest x-ray was obtained on September 20, 2019.    Indication: Status post CABG procedure.    Impression:    Poor inspiratory effort. The heart is enlarged. The lungs appear to be   clear. Intra-aortic balloon pump is about 6 cm away from the tip of the   aortic arch. All other life supporting devices are in good position and   unchanged when compared to previous study done the same date at 2:54 AM.   No pneumothorax.                    HERI HA M.D., ATTENDING RADIOLOGIST  This document has been electronically signed. Sep 20 2019  2:26PM                < end of copied text >

## 2019-09-21 NOTE — PROVIDER CONTACT NOTE (OTHER) - ASSESSMENT
Pt is hypotensive with fluctuating ECMO flows, CT output from meds 100-120/hr, requiring levo and vaso

## 2019-09-21 NOTE — PROGRESS NOTE ADULT - ASSESSMENT
64 year-old [place] male with history of T2DM, HTN sp open heart complicated by CVA and acute blood loss anemia  Hepatitis   Cardiogenic shock  JOSE ROBERTO  Hyperkalemia  DIC?  Metabolic acidosis  Rhabdo     1 Renal -Increase dialysate to lower the potassium.  Make note that there is lactate in the bags and may contribute to the increased levels.  He has hepatitis as well     2 Heme-Serial CBC at present and mult transfusions;  monitor TRALI    3 CVS-Ecmo and AIBP    Family is aware of poor prognosis

## 2019-09-21 NOTE — PROVIDER CONTACT NOTE (OTHER) - ASSESSMENT
Previous assessment at 0900 showed L pupil fixed and dilated. Now at 1000, B/L fixed and dilated, 7mm in size, irregular in shape

## 2019-09-21 NOTE — PROVIDER CONTACT NOTE (OTHER) - ACTION/TREATMENT ORDERED:
As per providers at bedside no further intervention at this time, pt w/ centrally cannulated ECMO and hx of severe B/L PVD pre-op. Continue to monitor pulses hourly.
MD Jenkins and SE Martinez aware, will continue to monitor.
Albumin ordered to be given as fast as possible.
As per providers, pt to receive a STAT bedside head CT.
Lopressor given. Will reassess in one hour. Will maintain safety.
MD Jenkins and SE Martinez aware. No further intervention at this time. Will continue to monitor.
No transfusions ordered at this time. Will continue to monitor.
Pacing initiated. 1u PRBC and 1 albumin ordered. Vaso added.
Team aware, will continue to monitor.
protonix drip ordered.
MD aware & at patient's bedside assessing patient & reviewed all orders, labs, history & plan. MD ordered to continue with Heparin Injectable Subcutaneous as ordered.

## 2019-09-21 NOTE — DISCHARGE NOTE FOR THE EXPIRED PATIENT - HOSPITAL COURSE
64 year old Indian Male with PMH HTN , DM2, current 2ppd smoker who presented to ED on 9/12 with 6 day h/o left sternal chest pain. Cath showed multivessel coronary artery  disease. Patient went to OR 9/19 for CABGx4 and required intra-aortic balloon bump and centrally cannulated VA ECMO with open chest in order to separate from cardio-pulmonary bypass. POD 0-1 patients course complicated AMS, GIB, mediastinal bleeding.   Early POD 1 CT Head report as follows: Interval new decreased attenuation, loss of gray-white distinction throughout the cerebral and cerebral hemispheres including and not   limited to, bilateral cerebelli, (3:10), fernanda, midbrain, brachium ponti, thalami, (3:22),, bilateral frontal, temporal, and parietal and occipital lobes. Interval 4 x 2 cm, AP, TR, left temporal, and 7.6 x 7.8 mm, AP, TR, left occipital intraparenchymal hemorrhage likely hemorrhagic   transformation. Findings concerning for hypoxic ischemic change with edema, mass effect, loss of gray-white distinction, sulcal effacement, and hemorrhagic transformation in the left temporal and occipital regions, no significant midline shift.. Slight effacement of the basal   cisterns. There is atherosclerotic vascular calcification of the carotid siphons.  Disconjugate orbital gaze with increased size of the superior ophthalmic veins suggesting increased intracranial pressure. Endotracheal tube and orogastric tube in situ with retained secretions in naso and oropharynx, with mucosal thickening, retention cysts and polyps bubbly secretions, fluid levels in maxillary, ethmoid, frontal and sphenoid sinuses, with   opacification of the mastoid air cells and middle ears. Interval new increased soft tissue swelling along the face and scalp with extension to the vertex.    Due to the patient's CT findings and neurologic exam with poor prognosis along with ongoing cardiogenic shock, lactic acidosis despite ECMO support, GI bleeding, mediastinal bleeding, renal failure requiring CVVHD, patient's family decided to withdraw ECMO, IABP support. Patient pronounced 13:19.

## 2019-09-21 NOTE — PROGRESS NOTE ADULT - SUBJECTIVE AND OBJECTIVE BOX
[facility]  MRN-[id number]  Patient is a 64y old  Male who presents with a chief complaint of Chest Pain (21 Sep 2019 08:38)    HPI:  64 year-old Nigerien male with history of T2DM, HTN and current 2ppd smoking who presents with 6 days of left sternal chest pain. States he arrived from [facility] on Friday night, when he felt this pain for the first time. After this, it happened about once per day. Each of these times, he says it lasted about 2-3 minutes and was about a 5-6/10 in intensity. Each time, he was not exerting himself, but doing light activity such as bathing or bending over to pick something up. The pain is located at the left sternal border and is non-radiating. This morning, he was sleeping at about 3am and experienced more severe chest pain that woke him up. This lasted about 30 minutes and was 8/10 in intensity, which is what prompted him to come to the hospital. The pain was never associated with shortness of breath, sweating, or nausea. He has noticed a new cough that has been worse in the past week as well. He has never experienced this chest pain before. He takes metformin for diabetes and captopril for hypertension, but does not know the doses and states that he ran out of these of Tuesday.     He complains of a right-sided headache with posterior right neck pain that has been present for two weeks as well. This is intermittent and lasts for 15 minutes.     ED Course: EKG w nonspecific ST and T wave abnormality and possible ectopic atrial rhythm. Troponin 19-->20. Plt 111. Hb 15.5, WBC 9.0, D-dimer 505, INR 1.06, pTT 24.7, Cr 0.94, . CXR shows clear lungs. CT Coronary was performed to r/o PE, showed significant calcification and no pulmonary embolus. Cardiology consulted, admitted for cardiac cath. (12 Sep 2019 16:34)      Surgery/Hospital course:  9/19/2019 C4 L, IABP, central ECMO , open chest  remain unresponsive. full vent support.  ECMO, CVVH in progress.   worsening multiorgan failure.      Physical Exam:  Vital Signs Last 24 Hrs  T(C): 37 (21 Sep 2019 11:00), Max: 37 (20 Sep 2019 16:00)  T(F): 98.6 (21 Sep 2019 11:00), Max: 98.6 (20 Sep 2019 16:00)  HR: 72 (21 Sep 2019 13:15) (23 - 80)  BP: --  BP(mean): --  RR: 20 (21 Sep 2019 13:15) (12 - 20)  SpO2: 91% (21 Sep 2019 13:15) (74% - 100%)  Gen:  Awake, alert   CNS: non focal 	  Neck: no JVD  RES : clear , no wheezing    Chest:   + chest tubes                     CVS: ECMO support  Abd: + distended  Skin: No rash.  Ext:  + edema, A Line    ============================I/O===========================   I&O's Detail    20 Sep 2019 07:01  -  21 Sep 2019 07:00  --------------------------------------------------------  IN:    Albumin 5%  - 250 mL: 500 mL    Cryoprecipitate: 150 mL    dextrose 5%.: 180 mL    DOBUTamine Infusion: 249.6 mL    FEIBA VH: 40 mL    insulin regular Infusion: 37 mL    IV PiggyBack: 100 mL    norepinephrine Infusion: 162.1 mL    Packed Red Blood Cells: 2800 mL    pantoprazole Infusion: 240 mL    Plasma: 1500 mL    Platelets - Single Donor: 600 mL    propofol Infusion: 182.5 mL    Sodium Chloride 0.9% IV Bolus: 500 mL    sodium chloride 0.9%.: 180 mL    Solution: 200 mL    vasopressin Infusion: 6 mL  Total IN: 7627.2 mL    OUT:    Chest Tube: 320 mL    Chest Tube: 4040 mL    Indwelling Catheter - Urethral: 2320 mL    Nasoenteral Tube: 1450 mL    Other: 17 mL  Total OUT: 8147 mL    Total NET: -519.8 mL      21 Sep 2019 07:01  -  21 Sep 2019 13:53  --------------------------------------------------------  IN:    dextrose 10%.: 150 mL    dextrose 5%.: 110 mL    DOBUTamine Infusion: 62.4 mL    norepinephrine Infusion: 29.8 mL    pantoprazole Infusion: 60 mL    propofol Infusion: 49.8 mL    vasopressin Infusion: 6 mL  Total IN: 468 mL    OUT:    Chest Tube: 140 mL    Chest Tube: 780 mL    Indwelling Catheter - Urethral: 50 mL    Nasoenteral Tube: 150 mL    Other: 11 mL  Total OUT: 1131 mL    Total NET: -663 mL        ============================ LABS =========================                        8.7    5.1   )-----------( 94       ( 21 Sep 2019 01:04 )             25.4     09-21    146<H>  |  108  |  21  ----------------------------<  77  6.6<HH>   |  19<L>  |  1.81<H>    Ca    8.0<L>      21 Sep 2019 01:04  Phos  7.5     09-21  Mg     2.3     09-21    TPro  3.6<L>  /  Alb  2.2<L>  /  TBili  2.8<H>  /  DBili  x   /  AST  1504<H>  /  ALT  546<H>  /  AlkPhos  53  09-21    LIVER FUNCTIONS - ( 21 Sep 2019 01:04 )  Alb: 2.2 g/dL / Pro: 3.6 g/dL / ALK PHOS: 53 U/L / ALT: 546 U/L / AST: 1504 U/L / GGT: x           PT/INR - ( 21 Sep 2019 01:04 )   PT: 16.3 sec;   INR: 1.40 ratio         PTT - ( 21 Sep 2019 01:04 )  PTT:55.1 sec  ABG - ( 21 Sep 2019 10:10 )  pH, Arterial: 7.20  pH, Blood: x     /  pCO2: 44    /  pO2: 469   / HCO3: 16    / Base Excess: -10.5 /  SaO2: 100       CXR: Reviewed  Echo:Reviewed  ======================================================  PAST MEDICAL & SURGICAL HISTORY:  Type 2 diabetes mellitus with other neurologic complication, without long-term current use of insulin  Hypertension, unspecified type  H/O elbow surgery: Right elbow surgery 2007    ====================ASSESMENT ==============  acute MI /NSTEMI, ASHD/CAD  9/19/2019 C4 L, IABP, central ECMO , open chest  anoxic encephalopathy  seizure  cardiogenic shock   hemorrhagic shock  acute blood loss anemia  Hyperkalemia  acute renal failure  DM2  smoker  Multiorgan failure.    Plan:  ====================== NEUROLOGY=====================  propofol Infusion 15 MICROgram(s)/kG/Min (6.228 mL/Hr) IV Continuous <Continuous>  ==================== RESPIRATORY======================  Mechanical Ventilation:  Mode: AC/ CMV (Assist Control/ Continuous Mandatory Ventilation)  RR (machine): 20  TV (machine): 550  FiO2: 50  PEEP: 5    ====================CARDIOVASCULAR==================  VA ECMO support  DOBUTamine Infusion 5 MICROgram(s)/kG/Min (10.38 mL/Hr) IV Continuous <Continuous>  norepinephrine Infusion 0.05 MICROgram(s)/kG/Min (6.488 mL/Hr) IV Continuous <Continuous>    ===================HEMATOLOGIC/ONC ================  transfusion blood and blood product   ================= RENAL =========================  CVVH renal replacement therapy  Garcia for monitoring urine output  ==================== GASTROINTESTINAL===================  dextrose 10%. 1000 milliLiter(s) (50 mL/Hr) IV Continuous <Continuous>  docusate sodium 100 milliGRAM(s) Oral three times a day  pantoprazole Infusion 8 mG/Hr (10 mL/Hr) IV Continuous <Continuous>  sodium chloride 0.9%. 1000 milliLiter(s) (10 mL/Hr) IV Continuous <Continuous>    =======================    ENDOCRINE  =====================  hydrocortisone sodium succinate Injectable 100 milliGRAM(s) IV Push every 6 hours  insulin regular Infusion 2 Unit(s)/Hr (2 mL/Hr) IV Continuous <Continuous>  vasopressin Infusion 0.1 Unit(s)/Min (6 mL/Hr) IV Continuous <Continuous>    ========================INFECTIOUS DISEASE================  cefuroxime  IVPB 1500 milliGRAM(s) IV Intermittent every 8 hours    Patient requires continuous monitoring with bedside rhythm monitoring,arterial line,pulse oximetry,ventilator monitoring;intermittent blood gas analysis.  Care plan discussed with ICU care team. Dr. Santos also discussed with pt family and sister. grave prognosis, medical futile. worsening despite adcanced therapy.    discussed with pt family and his sister at bed side. pt with grave prognosis with severe anoxic brain ischemia. they requested pt be DNR and withdrawal of mechanical support.  which was done.   pt was pronounced dead at 13:19 pm. family informed at bed side.   case was presented to Medical examiner Aditi Mckeon , not ME case.  pt was critical, critical care time spent 50 min .

## 2019-09-21 NOTE — PROVIDER CONTACT NOTE (OTHER) - ASSESSMENT
L pupil fixed and nonreactive, dilated at 7mm. R pupil remains 1mm and sluggish. Pt remains on 20 mcg of propofol.

## 2019-09-21 NOTE — PROVIDER CONTACT NOTE (OTHER) - SITUATION
pt s/p CABG x 4, POD #2 with central ECMO placement During initial patient assessment, unable to doppler popliteal pulses, femoral pulses present.  B/l LE cool to touch and mottled.  Abdomen is distended and taught, no bowel sounds audible.

## 2019-09-21 NOTE — PROVIDER CONTACT NOTE (OTHER) - ASSESSMENT
During initial patient assessment, unable to doppler popliteal pulses, femoral pulses present.  B/l LE cool to touch and mottled.  Abdomen is distended and taught, no bowel sounds audible. Lactate elevated 8-9. K >6.5. CPK >20,000

## 2019-09-21 NOTE — PROGRESS NOTE ADULT - REASON FOR ADMISSION
Chest Pain

## 2019-09-21 NOTE — PROGRESS NOTE ADULT - SUBJECTIVE AND OBJECTIVE BOX
Addendum to Intra-operative Anesthesia Record (Incomplete & Damaged)    Medical record reviewed. Patient seen and consented for Anesthesia/CABG.  Routine monitors placed including cerebral oximetry, IV access adequate, radial A-line placed.  Smooth induction/intubation hemodynamically stable throughout.   RIJ Introducer/SLIC placed/confirmed with ultrasound. MIGUEL ANGEL probe passed easily.  Routine prep and drape.MIGUEL ANGEL exam remarkable for mild dilation of RV with normal function and no significant tricuspid regurgitation. Hypokinetic inferior wall with overall preserved LV function with LVH. Mild central mitral regurgitation with slight prolapse of posterior leaflet tip P2. Severe atheroma of descending aorta.Unremarkable/routine Pre-Bypass. Hemodynamically stable, NSR, No MIGUEL ANGEL changes, stable cerebral oximetry.  Heparin/ACT routine for CPB.  Unremarkable CPB, stable on pump, hemodynamics maintained MAPS high normal for aortic disease.Routine preparation for CPB ween.  Epicardial pacing initiated. Low dose Dobutamine initiated for diminished contractility. Low dose Norepinephrine to maintain MAP. Unremarkable ween from CPB. Minimal hemodynamic support. MIGUEL ANGEL LV/RV function not at Pre-Bypass level. Dobutamine increased. Patient recovered intrinsic HR and pacing discontinued to VVI backup. Improved LV/RV function dobutamine at 5 mcg/kg/min. Hemodynamics satisfactory. MIGUEL ANGEL reviewed: now no changes from Pre-Bypass.  Protamine administered (pretreated with Benadryl)  without hemodynamic changes.  Unremarkable Post CPB course. Hemodynamically stable. No changes in cerebral oximetry.  Platelets/cryo for bleeding. ACT elevated ~200 with appropriate protamine dose.  Additional protamine given. ACT rechecked different machine~190, additional protamine for total 450mg. ABG sent/reviewed/corrected-supplemented as indicated.  Chest closed.  Continued resuscitation.  Post chest closure SBP decreased. MIGUEL ANGEL: decrease LV/RV function Severe MR. Increased ionotropic/vasopressor support without significant improvement.  Chest reopened, grafts explored.  Unable to maintain hemodynamics on high dose support.  No EKG changes, No significant drop in cerebral oximetry.  Emergent Re-Heparinization/Cannulation and return to CPB. Low MAP ~ 20 mm radial [place] with soft aorta.  Vasoplegia managed with methylene blue & Cyanocobalamine. Femoral Corinne placed. Vasoplegia resolved. BP support to minimal.  No EKG oximetry changes.Heart rested on CPB, all anesthetics, drips etc. reset for second ween.  ABGs reviewed with perfusion.  Dobutamine changed to Epinephrine & Primacor. Norepinephrine (No vasopressin response) for second ween.  IABP considered/declined for aortic disease.  Possible Protamine reaction.CPB#2 ween on higher dose support smooth on escalated ionotropic/pressor support.  MIGUEL ANGEL/heart function satisfactory.  Protamine administered slowly with pretreated with steroids.  20-30 minutes post Protamine hemodynamically unstable. Unable to maintain hemodynamics MIGUEL ANGEL severe LV/RV/MR. Re-Heparinization/Cannulation and return to CPB .  Heart rested on CPB. Nitroglycerine infusion/ vasodilators initiated to improve CPB flow in setting of high ionotropic/vasopressors.  No significant changes EKG cerebral oximetry.  IABP placed unable to confirm tip placement due to MIGUEL ANGEL dropout.  ABGs reviewed with perfusion.  Successful ween from CPB, IABP augmenting well, moderate ionotropic/vasopressor support.  No reversal of heparin (likely protamine reactions) .  20-30 minutes post ween hemodynamically unstable, brief asystole, pacer not capturing,  Return to CPB.  While on CPB patient developed severe hypertension MAPS ~140 refractory to treatment in absence of significant vasopressors, decrease in cerebral oximetry. Hypertension resulting from suspected ICH. Patient placed on VA ECMO.  Low dose dobutamine.  Continued resuscitation and transferred to CTICU.

## 2019-09-21 NOTE — PROGRESS NOTE ADULT - PROVIDER SPECIALTY LIST ADULT
CT Surgery
CTU
CTU
Cardiology
Critical Care
Endocrinology
Endocrinology
Internal Medicine
Nephrology
Critical Care
Anesthesia
Cardiology
Cardiology
Internal Medicine

## 2019-10-31 PROCEDURE — 85730 THROMBOPLASTIN TIME PARTIAL: CPT

## 2019-10-31 PROCEDURE — P9059: CPT

## 2019-10-31 PROCEDURE — 82550 ASSAY OF CK (CPK): CPT

## 2019-10-31 PROCEDURE — 82803 BLOOD GASES ANY COMBINATION: CPT

## 2019-10-31 PROCEDURE — 83615 LACTATE (LD) (LDH) ENZYME: CPT

## 2019-10-31 PROCEDURE — 84484 ASSAY OF TROPONIN QUANT: CPT

## 2019-10-31 PROCEDURE — P9045: CPT

## 2019-10-31 PROCEDURE — 85384 FIBRINOGEN ACTIVITY: CPT

## 2019-10-31 PROCEDURE — 86850 RBC ANTIBODY SCREEN: CPT

## 2019-10-31 PROCEDURE — 94640 AIRWAY INHALATION TREATMENT: CPT

## 2019-10-31 PROCEDURE — 86891 AUTOLOGOUS BLOOD OP SALVAGE: CPT

## 2019-10-31 PROCEDURE — 84100 ASSAY OF PHOSPHORUS: CPT

## 2019-10-31 PROCEDURE — 71045 X-RAY EXAM CHEST 1 VIEW: CPT

## 2019-10-31 PROCEDURE — 85027 COMPLETE CBC AUTOMATED: CPT

## 2019-10-31 PROCEDURE — 85379 FIBRIN DEGRADATION QUANT: CPT

## 2019-10-31 PROCEDURE — 84295 ASSAY OF SERUM SODIUM: CPT

## 2019-10-31 PROCEDURE — P9037: CPT

## 2019-10-31 PROCEDURE — 36430 TRANSFUSION BLD/BLD COMPNT: CPT

## 2019-10-31 PROCEDURE — P9040: CPT

## 2019-10-31 PROCEDURE — 87640 STAPH A DNA AMP PROBE: CPT

## 2019-10-31 PROCEDURE — 85049 AUTOMATED PLATELET COUNT: CPT

## 2019-10-31 PROCEDURE — 94010 BREATHING CAPACITY TEST: CPT

## 2019-10-31 PROCEDURE — 93306 TTE W/DOPPLER COMPLETE: CPT

## 2019-10-31 PROCEDURE — P9017: CPT

## 2019-10-31 PROCEDURE — 94003 VENT MGMT INPAT SUBQ DAY: CPT

## 2019-10-31 PROCEDURE — 82330 ASSAY OF CALCIUM: CPT

## 2019-10-31 PROCEDURE — P9012: CPT

## 2019-10-31 PROCEDURE — 81001 URINALYSIS AUTO W/SCOPE: CPT

## 2019-10-31 PROCEDURE — 99152 MOD SED SAME PHYS/QHP 5/>YRS: CPT

## 2019-10-31 PROCEDURE — 86803 HEPATITIS C AB TEST: CPT

## 2019-10-31 PROCEDURE — P9016: CPT

## 2019-10-31 PROCEDURE — 70496 CT ANGIOGRAPHY HEAD: CPT

## 2019-10-31 PROCEDURE — 85576 BLOOD PLATELET AGGREGATION: CPT

## 2019-10-31 PROCEDURE — 82962 GLUCOSE BLOOD TEST: CPT

## 2019-10-31 PROCEDURE — C1769: CPT

## 2019-10-31 PROCEDURE — 87641 MR-STAPH DNA AMP PROBE: CPT

## 2019-10-31 PROCEDURE — 84443 ASSAY THYROID STIM HORMONE: CPT

## 2019-10-31 PROCEDURE — 70450 CT HEAD/BRAIN W/O DYE: CPT

## 2019-10-31 PROCEDURE — 80053 COMPREHEN METABOLIC PANEL: CPT

## 2019-10-31 PROCEDURE — 83605 ASSAY OF LACTIC ACID: CPT

## 2019-10-31 PROCEDURE — P9047: CPT

## 2019-10-31 PROCEDURE — 82985 ASSAY OF GLYCATED PROTEIN: CPT

## 2019-10-31 PROCEDURE — 70498 CT ANGIOGRAPHY NECK: CPT

## 2019-10-31 PROCEDURE — 93308 TTE F-UP OR LMTD: CPT

## 2019-10-31 PROCEDURE — C1887: CPT

## 2019-10-31 PROCEDURE — 81003 URINALYSIS AUTO W/O SCOPE: CPT

## 2019-10-31 PROCEDURE — 80061 LIPID PANEL: CPT

## 2019-10-31 PROCEDURE — 75574 CT ANGIO HRT W/3D IMAGE: CPT

## 2019-10-31 PROCEDURE — 82565 ASSAY OF CREATININE: CPT

## 2019-10-31 PROCEDURE — 82553 CREATINE MB FRACTION: CPT

## 2019-10-31 PROCEDURE — 85610 PROTHROMBIN TIME: CPT

## 2019-10-31 PROCEDURE — 84480 ASSAY TRIIODOTHYRONINE (T3): CPT

## 2019-10-31 PROCEDURE — 83735 ASSAY OF MAGNESIUM: CPT

## 2019-10-31 PROCEDURE — 83010 ASSAY OF HAPTOGLOBIN QUANT: CPT

## 2019-10-31 PROCEDURE — 83036 HEMOGLOBIN GLYCOSYLATED A1C: CPT

## 2019-10-31 PROCEDURE — 82435 ASSAY OF BLOOD CHLORIDE: CPT

## 2019-10-31 PROCEDURE — 93880 EXTRACRANIAL BILAT STUDY: CPT

## 2019-10-31 PROCEDURE — 86923 COMPATIBILITY TEST ELECTRIC: CPT

## 2019-10-31 PROCEDURE — C1889: CPT

## 2019-10-31 PROCEDURE — 93005 ELECTROCARDIOGRAM TRACING: CPT

## 2019-10-31 PROCEDURE — 99285 EMERGENCY DEPT VISIT HI MDM: CPT | Mod: 25

## 2019-10-31 PROCEDURE — 85014 HEMATOCRIT: CPT

## 2019-10-31 PROCEDURE — 80048 BASIC METABOLIC PNL TOTAL CA: CPT

## 2019-10-31 PROCEDURE — 86900 BLOOD TYPING SEROLOGIC ABO: CPT

## 2019-10-31 PROCEDURE — 71046 X-RAY EXAM CHEST 2 VIEWS: CPT

## 2019-10-31 PROCEDURE — 84436 ASSAY OF TOTAL THYROXINE: CPT

## 2019-10-31 PROCEDURE — 93458 L HRT ARTERY/VENTRICLE ANGIO: CPT

## 2019-10-31 PROCEDURE — 84132 ASSAY OF SERUM POTASSIUM: CPT

## 2019-10-31 PROCEDURE — 83880 ASSAY OF NATRIURETIC PEPTIDE: CPT

## 2019-10-31 PROCEDURE — C1894: CPT

## 2019-10-31 PROCEDURE — 82947 ASSAY GLUCOSE BLOOD QUANT: CPT

## 2019-10-31 PROCEDURE — 86901 BLOOD TYPING SEROLOGIC RH(D): CPT

## 2020-01-03 NOTE — PATIENT PROFILE ADULT - FALL HARM RISK CONCLUSION
Doxepin Counseling:  Patient advised that the medication is sedating and not to drive a car after taking this medication. Patient informed of potential adverse effects including but not limited to dry mouth, urinary retention, and blurry vision.  The patient verbalized understanding of the proper use and possible adverse effects of doxepin.  All of the patient's questions and concerns were addressed. Fall with Harm Risk
